# Patient Record
Sex: MALE | Race: WHITE | NOT HISPANIC OR LATINO | Employment: OTHER | URBAN - METROPOLITAN AREA
[De-identification: names, ages, dates, MRNs, and addresses within clinical notes are randomized per-mention and may not be internally consistent; named-entity substitution may affect disease eponyms.]

---

## 2023-04-03 LAB
ALANINE AMINOTRANSFERASE (SGPT) (U/L) IN SER/PLAS: 17 U/L (ref 10–52)
ALBUMIN (G/DL) IN SER/PLAS: 4.3 G/DL (ref 3.4–5)
ALKALINE PHOSPHATASE (U/L) IN SER/PLAS: 94 U/L (ref 33–136)
ANION GAP IN SER/PLAS: 11 MMOL/L (ref 10–20)
ASPARTATE AMINOTRANSFERASE (SGOT) (U/L) IN SER/PLAS: 16 U/L (ref 9–39)
BILIRUBIN TOTAL (MG/DL) IN SER/PLAS: 0.9 MG/DL (ref 0–1.2)
CALCIDIOL (25 OH VITAMIN D3) (NG/ML) IN SER/PLAS: 50 NG/ML
CALCIUM (MG/DL) IN SER/PLAS: 9.8 MG/DL (ref 8.6–10.3)
CARBON DIOXIDE, TOTAL (MMOL/L) IN SER/PLAS: 29 MMOL/L (ref 21–32)
CHLORIDE (MMOL/L) IN SER/PLAS: 103 MMOL/L (ref 98–107)
CREATININE (MG/DL) IN SER/PLAS: 1.05 MG/DL (ref 0.5–1.3)
GFR MALE: 78 ML/MIN/1.73M2
GLUCOSE (MG/DL) IN SER/PLAS: 94 MG/DL (ref 74–99)
POTASSIUM (MMOL/L) IN SER/PLAS: 4 MMOL/L (ref 3.5–5.3)
PROTEIN TOTAL: 7 G/DL (ref 6.4–8.2)
SODIUM (MMOL/L) IN SER/PLAS: 139 MMOL/L (ref 136–145)
UREA NITROGEN (MG/DL) IN SER/PLAS: 9 MG/DL (ref 6–23)

## 2023-04-28 ENCOUNTER — OFFICE VISIT (OUTPATIENT)
Dept: PRIMARY CARE | Facility: CLINIC | Age: 68
End: 2023-04-28
Payer: MEDICARE

## 2023-04-28 VITALS
HEART RATE: 60 BPM | TEMPERATURE: 96.1 F | HEIGHT: 73 IN | DIASTOLIC BLOOD PRESSURE: 85 MMHG | OXYGEN SATURATION: 94 % | RESPIRATION RATE: 16 BRPM | SYSTOLIC BLOOD PRESSURE: 120 MMHG | WEIGHT: 156.4 LBS | BODY MASS INDEX: 20.73 KG/M2

## 2023-04-28 DIAGNOSIS — J42 CHRONIC BRONCHITIS, UNSPECIFIED CHRONIC BRONCHITIS TYPE (MULTI): Primary | ICD-10-CM

## 2023-04-28 DIAGNOSIS — E55.9 VITAMIN D DEFICIENCY: ICD-10-CM

## 2023-04-28 DIAGNOSIS — Z13.29 THYROID DISORDER SCREEN: ICD-10-CM

## 2023-04-28 DIAGNOSIS — E87.1 HYPONATREMIA: ICD-10-CM

## 2023-04-28 DIAGNOSIS — Z13.220 SCREENING FOR HYPERLIPIDEMIA: ICD-10-CM

## 2023-04-28 DIAGNOSIS — I10 BENIGN ESSENTIAL HYPERTENSION: ICD-10-CM

## 2023-04-28 DIAGNOSIS — Z12.5 SCREENING FOR PROSTATE CANCER: ICD-10-CM

## 2023-04-28 PROBLEM — H93.13 TINNITUS OF BOTH EARS: Status: ACTIVE | Noted: 2023-04-28

## 2023-04-28 PROBLEM — E87.6 HYPOKALEMIA: Status: ACTIVE | Noted: 2023-04-28

## 2023-04-28 PROBLEM — M48.061 SPINAL STENOSIS OF LUMBAR REGION: Status: ACTIVE | Noted: 2023-04-28

## 2023-04-28 PROBLEM — M47.817 LUMBOSACRAL SPONDYLOSIS WITHOUT MYELOPATHY: Status: ACTIVE | Noted: 2023-04-28

## 2023-04-28 PROBLEM — Z96.21 COCHLEAR IMPLANT IN PLACE: Status: ACTIVE | Noted: 2023-04-28

## 2023-04-28 PROBLEM — M47.816 DJD (DEGENERATIVE JOINT DISEASE), LUMBAR: Status: ACTIVE | Noted: 2023-04-28

## 2023-04-28 PROBLEM — M51.369 DEGENERATION OF LUMBAR INTERVERTEBRAL DISC: Status: ACTIVE | Noted: 2023-04-28

## 2023-04-28 PROBLEM — M51.36 DEGENERATION OF LUMBAR INTERVERTEBRAL DISC: Status: ACTIVE | Noted: 2023-04-28

## 2023-04-28 PROBLEM — M47.814 DJD (DEGENERATIVE JOINT DISEASE), THORACIC: Status: ACTIVE | Noted: 2023-04-28

## 2023-04-28 PROBLEM — F10.11 ALCOHOL ABUSE, IN REMISSION: Status: ACTIVE | Noted: 2023-04-28

## 2023-04-28 PROBLEM — K63.5 HYPERPLASTIC COLONIC POLYP: Status: ACTIVE | Noted: 2023-04-28

## 2023-04-28 PROCEDURE — 3074F SYST BP LT 130 MM HG: CPT | Performed by: FAMILY MEDICINE

## 2023-04-28 PROCEDURE — 99214 OFFICE O/P EST MOD 30 MIN: CPT | Performed by: FAMILY MEDICINE

## 2023-04-28 PROCEDURE — 3079F DIAST BP 80-89 MM HG: CPT | Performed by: FAMILY MEDICINE

## 2023-04-28 PROCEDURE — 4004F PT TOBACCO SCREEN RCVD TLK: CPT | Performed by: FAMILY MEDICINE

## 2023-04-28 PROCEDURE — 1159F MED LIST DOCD IN RCRD: CPT | Performed by: FAMILY MEDICINE

## 2023-04-28 RX ORDER — METOPROLOL TARTRATE 50 MG/1
1 TABLET ORAL DAILY
COMMUNITY
Start: 2022-04-05 | End: 2023-08-29 | Stop reason: WASHOUT

## 2023-04-28 RX ORDER — FLUTICASONE PROPIONATE AND SALMETEROL 250; 50 UG/1; UG/1
POWDER RESPIRATORY (INHALATION) 2 TIMES DAILY
COMMUNITY
Start: 2020-02-19 | End: 2024-01-04 | Stop reason: SDUPTHER

## 2023-04-28 RX ORDER — ALBUTEROL SULFATE 90 UG/1
AEROSOL, METERED RESPIRATORY (INHALATION)
COMMUNITY
Start: 2022-03-22 | End: 2024-01-04 | Stop reason: SDUPTHER

## 2023-04-28 ASSESSMENT — ENCOUNTER SYMPTOMS
ABDOMINAL PAIN: 0
CHEST TIGHTNESS: 0
ARTHRALGIAS: 0
CONFUSION: 0
FEVER: 0
SHORTNESS OF BREATH: 0
PALPITATIONS: 0
CHILLS: 0

## 2023-04-28 NOTE — PROGRESS NOTES
"Subjective   Patient ID: Isaac Condon is a 68 y.o. male who presents for Follow-up (5 month check and review labs).    HPI   Patient today for follow-up of ongoing healthcare issues and review of lab work he denies any major new acute issues.  Says he is tolerating his medications.  Review of Systems   Constitutional:  Negative for chills and fever.   HENT:  Negative for congestion and ear pain.    Eyes:  Negative for visual disturbance.   Respiratory:  Negative for chest tightness and shortness of breath.    Cardiovascular:  Negative for chest pain and palpitations.   Gastrointestinal:  Negative for abdominal pain.   Musculoskeletal:  Negative for arthralgias.   Skin:  Negative for pallor.   Psychiatric/Behavioral:  Negative for confusion.        Objective   /85 (BP Location: Left arm, Patient Position: Sitting)   Pulse 60   Temp 35.6 °C (96.1 °F)   Resp 16   Ht 1.854 m (6' 1\")   Wt 70.9 kg (156 lb 6.4 oz)   SpO2 94%   BMI 20.63 kg/m²     Physical Exam  Vitals and nursing note reviewed.   Constitutional:       General: He is not in acute distress.     Appearance: Normal appearance. He is not ill-appearing.   HENT:      Head: Normocephalic and atraumatic.      Right Ear: Tympanic membrane, ear canal and external ear normal.      Left Ear: Tympanic membrane, ear canal and external ear normal.      Mouth/Throat:      Pharynx: Oropharynx is clear.   Eyes:      Extraocular Movements: Extraocular movements intact.   Cardiovascular:      Rate and Rhythm: Normal rate and regular rhythm.      Pulses: Normal pulses.      Heart sounds: Normal heart sounds.   Pulmonary:      Effort: Pulmonary effort is normal.      Breath sounds: Normal breath sounds.   Abdominal:      General: Abdomen is flat. Bowel sounds are normal.      Palpations: Abdomen is soft.      Tenderness: There is no abdominal tenderness.   Musculoskeletal:         General: Normal range of motion.      Cervical back: Neck supple.   Skin:     " General: Skin is warm.   Neurological:      Mental Status: He is alert and oriented to person, place, and time. Mental status is at baseline.   Psychiatric:         Mood and Affect: Mood normal.       Recent Results (from the past 1008 hour(s))   Vitamin D, Total    Collection Time: 04/03/23 10:23 AM   Result Value Ref Range    Vitamin D, 25-Hydroxy 50 ng/mL   Comprehensive Metabolic Panel    Collection Time: 04/03/23 10:23 AM   Result Value Ref Range    Glucose 94 74 - 99 mg/dL    Sodium 139 136 - 145 mmol/L    Potassium 4.0 3.5 - 5.3 mmol/L    Chloride 103 98 - 107 mmol/L    Bicarbonate 29 21 - 32 mmol/L    Anion Gap 11 10 - 20 mmol/L    Urea Nitrogen 9 6 - 23 mg/dL    Creatinine 1.05 0.50 - 1.30 mg/dL    GFR MALE 78 >90 mL/min/1.73m2    Calcium 9.8 8.6 - 10.3 mg/dL    Albumin 4.3 3.4 - 5.0 g/dL    Alkaline Phosphatase 94 33 - 136 U/L    Total Protein 7.0 6.4 - 8.2 g/dL    AST 16 9 - 39 U/L    Total Bilirubin 0.9 0.0 - 1.2 mg/dL    ALT (SGPT) 17 10 - 52 U/L     Return to office 4 months with repeat fasting labs    Assessment/Plan   Problem List Items Addressed This Visit       Benign essential hypertension     Stable continue current medication         Relevant Orders    Follow Up In Primary Care    CBC    Comprehensive Metabolic Panel    Chronic bronchitis (CMS/HCC) - Primary     Clinically stable patient denies any acute flareups unfortunately though he continues to smoke.  Continue Advair Diskus as well as albuterol rescue inhaler which she really feels the need for.         Relevant Orders    Follow Up In Primary Care    CBC    Comprehensive Metabolic Panel    Hyponatremia     Sodium in normal range continue lifestyle modifications and abstain from alcohol         Relevant Orders    Follow Up In Primary Care    Vitamin D deficiency     Vitamin D has improved he will remain off of vitamin D supplement we will continue to monitor         Relevant Orders    Follow Up In Primary Care    Vitamin D 1,25 Dihydroxy     Screening for prostate cancer     Screening PSA         Relevant Orders    Prostate Specific Antigen, Screen    Thyroid disorder screen     Screening TSH         Relevant Orders    TSH with reflex to Free T4 if abnormal    Screening for hyperlipidemia     Fasting lipid         Relevant Orders    Lipid Panel

## 2023-04-28 NOTE — ASSESSMENT & PLAN NOTE
Clinically stable patient denies any acute flareups unfortunately though he continues to smoke.  Continue Advair Diskus as well as albuterol rescue inhaler which she really feels the need for.

## 2023-08-21 ENCOUNTER — LAB (OUTPATIENT)
Dept: LAB | Facility: LAB | Age: 68
End: 2023-08-21
Payer: MEDICARE

## 2023-08-21 DIAGNOSIS — E55.9 VITAMIN D DEFICIENCY: ICD-10-CM

## 2023-08-21 DIAGNOSIS — Z12.5 SCREENING FOR PROSTATE CANCER: ICD-10-CM

## 2023-08-21 DIAGNOSIS — J42 CHRONIC BRONCHITIS, UNSPECIFIED CHRONIC BRONCHITIS TYPE (MULTI): ICD-10-CM

## 2023-08-21 DIAGNOSIS — Z13.220 SCREENING FOR HYPERLIPIDEMIA: ICD-10-CM

## 2023-08-21 DIAGNOSIS — I10 BENIGN ESSENTIAL HYPERTENSION: ICD-10-CM

## 2023-08-21 DIAGNOSIS — Z13.29 THYROID DISORDER SCREEN: ICD-10-CM

## 2023-08-21 LAB
ALANINE AMINOTRANSFERASE (SGPT) (U/L) IN SER/PLAS: 16 U/L (ref 10–52)
ALBUMIN (G/DL) IN SER/PLAS: 3.9 G/DL (ref 3.4–5)
ALKALINE PHOSPHATASE (U/L) IN SER/PLAS: 118 U/L (ref 33–136)
ANION GAP IN SER/PLAS: 12 MMOL/L (ref 10–20)
ASPARTATE AMINOTRANSFERASE (SGOT) (U/L) IN SER/PLAS: 15 U/L (ref 9–39)
BILIRUBIN TOTAL (MG/DL) IN SER/PLAS: 0.7 MG/DL (ref 0–1.2)
CALCIUM (MG/DL) IN SER/PLAS: 9.3 MG/DL (ref 8.6–10.3)
CARBON DIOXIDE, TOTAL (MMOL/L) IN SER/PLAS: 26 MMOL/L (ref 21–32)
CHLORIDE (MMOL/L) IN SER/PLAS: 101 MMOL/L (ref 98–107)
CHOLESTEROL (MG/DL) IN SER/PLAS: 194 MG/DL (ref 0–199)
CHOLESTEROL IN HDL (MG/DL) IN SER/PLAS: 50.8 MG/DL
CHOLESTEROL/HDL RATIO: 3.8
CREATININE (MG/DL) IN SER/PLAS: 0.91 MG/DL (ref 0.5–1.3)
ERYTHROCYTE DISTRIBUTION WIDTH (RATIO) BY AUTOMATED COUNT: 11.4 % (ref 11.5–14.5)
ERYTHROCYTE MEAN CORPUSCULAR HEMOGLOBIN CONCENTRATION (G/DL) BY AUTOMATED: 34.8 G/DL (ref 32–36)
ERYTHROCYTE MEAN CORPUSCULAR VOLUME (FL) BY AUTOMATED COUNT: 96 FL (ref 80–100)
ERYTHROCYTES (10*6/UL) IN BLOOD BY AUTOMATED COUNT: 4.59 X10E12/L (ref 4.5–5.9)
GFR MALE: >90 ML/MIN/1.73M2
GLUCOSE (MG/DL) IN SER/PLAS: 83 MG/DL (ref 74–99)
HEMATOCRIT (%) IN BLOOD BY AUTOMATED COUNT: 44.2 % (ref 41–52)
HEMOGLOBIN (G/DL) IN BLOOD: 15.4 G/DL (ref 13.5–17.5)
LDL: 123 MG/DL (ref 0–99)
LEUKOCYTES (10*3/UL) IN BLOOD BY AUTOMATED COUNT: 6.1 X10E9/L (ref 4.4–11.3)
PLATELETS (10*3/UL) IN BLOOD AUTOMATED COUNT: 266 X10E9/L (ref 150–450)
POTASSIUM (MMOL/L) IN SER/PLAS: 4.1 MMOL/L (ref 3.5–5.3)
PROSTATE SPECIFIC ANTIGEN,SCREEN: 0.31 NG/ML (ref 0–4)
PROTEIN TOTAL: 6.7 G/DL (ref 6.4–8.2)
SODIUM (MMOL/L) IN SER/PLAS: 135 MMOL/L (ref 136–145)
THYROTROPIN (MIU/L) IN SER/PLAS BY DETECTION LIMIT <= 0.05 MIU/L: 2.27 MIU/L (ref 0.44–3.98)
TRIGLYCERIDE (MG/DL) IN SER/PLAS: 99 MG/DL (ref 0–149)
UREA NITROGEN (MG/DL) IN SER/PLAS: 8 MG/DL (ref 6–23)
VLDL: 20 MG/DL (ref 0–40)

## 2023-08-21 PROCEDURE — 80061 LIPID PANEL: CPT

## 2023-08-21 PROCEDURE — 80053 COMPREHEN METABOLIC PANEL: CPT

## 2023-08-21 PROCEDURE — 84443 ASSAY THYROID STIM HORMONE: CPT

## 2023-08-21 PROCEDURE — 36415 COLL VENOUS BLD VENIPUNCTURE: CPT

## 2023-08-21 PROCEDURE — 82652 VIT D 1 25-DIHYDROXY: CPT

## 2023-08-21 PROCEDURE — G0103 PSA SCREENING: HCPCS

## 2023-08-21 PROCEDURE — 85027 COMPLETE CBC AUTOMATED: CPT

## 2023-08-24 LAB — VITAMIN D 1,25-DIHYDROXY: 54.7 PG/ML (ref 19.9–79.3)

## 2023-08-29 ENCOUNTER — OFFICE VISIT (OUTPATIENT)
Dept: PRIMARY CARE | Facility: CLINIC | Age: 68
End: 2023-08-29
Payer: MEDICARE

## 2023-08-29 VITALS
DIASTOLIC BLOOD PRESSURE: 80 MMHG | TEMPERATURE: 97.5 F | OXYGEN SATURATION: 93 % | HEIGHT: 72 IN | RESPIRATION RATE: 14 BRPM | HEART RATE: 74 BPM | BODY MASS INDEX: 19.91 KG/M2 | WEIGHT: 147 LBS | SYSTOLIC BLOOD PRESSURE: 132 MMHG

## 2023-08-29 DIAGNOSIS — I10 BENIGN ESSENTIAL HYPERTENSION: ICD-10-CM

## 2023-08-29 DIAGNOSIS — Z00.00 MEDICARE ANNUAL WELLNESS VISIT, SUBSEQUENT: Primary | ICD-10-CM

## 2023-08-29 DIAGNOSIS — E55.9 VITAMIN D DEFICIENCY: ICD-10-CM

## 2023-08-29 DIAGNOSIS — E87.1 HYPONATREMIA: ICD-10-CM

## 2023-08-29 DIAGNOSIS — J42 CHRONIC BRONCHITIS, UNSPECIFIED CHRONIC BRONCHITIS TYPE (MULTI): ICD-10-CM

## 2023-08-29 PROCEDURE — G0439 PPPS, SUBSEQ VISIT: HCPCS | Performed by: FAMILY MEDICINE

## 2023-08-29 PROCEDURE — 1125F AMNT PAIN NOTED PAIN PRSNT: CPT | Performed by: FAMILY MEDICINE

## 2023-08-29 PROCEDURE — 1159F MED LIST DOCD IN RCRD: CPT | Performed by: FAMILY MEDICINE

## 2023-08-29 PROCEDURE — 1160F RVW MEDS BY RX/DR IN RCRD: CPT | Performed by: FAMILY MEDICINE

## 2023-08-29 PROCEDURE — 3079F DIAST BP 80-89 MM HG: CPT | Performed by: FAMILY MEDICINE

## 2023-08-29 PROCEDURE — 3075F SYST BP GE 130 - 139MM HG: CPT | Performed by: FAMILY MEDICINE

## 2023-08-29 PROCEDURE — 1170F FXNL STATUS ASSESSED: CPT | Performed by: FAMILY MEDICINE

## 2023-08-29 PROCEDURE — 99213 OFFICE O/P EST LOW 20 MIN: CPT | Performed by: FAMILY MEDICINE

## 2023-08-29 PROCEDURE — 4004F PT TOBACCO SCREEN RCVD TLK: CPT | Performed by: FAMILY MEDICINE

## 2023-08-29 RX ORDER — METOPROLOL TARTRATE 50 MG/1
50 TABLET ORAL DAILY
Qty: 30 TABLET | Refills: 11 | Status: CANCELLED | OUTPATIENT
Start: 2023-08-29 | End: 2024-08-28

## 2023-08-29 ASSESSMENT — ENCOUNTER SYMPTOMS
ARTHRALGIAS: 0
FEVER: 0
CHEST TIGHTNESS: 0
CHILLS: 0
PALPITATIONS: 0
ABDOMINAL PAIN: 0
DEPRESSION: 0
OCCASIONAL FEELINGS OF UNSTEADINESS: 0
LOSS OF SENSATION IN FEET: 0
SHORTNESS OF BREATH: 0
CONFUSION: 0

## 2023-08-29 ASSESSMENT — PATIENT HEALTH QUESTIONNAIRE - PHQ9
2. FEELING DOWN, DEPRESSED OR HOPELESS: NOT AT ALL
1. LITTLE INTEREST OR PLEASURE IN DOING THINGS: NOT AT ALL
SUM OF ALL RESPONSES TO PHQ9 QUESTIONS 1 AND 2: 0

## 2023-08-29 ASSESSMENT — ACTIVITIES OF DAILY LIVING (ADL)
MANAGING_FINANCES: INDEPENDENT
BATHING: INDEPENDENT
GROCERY_SHOPPING: INDEPENDENT
DRESSING: INDEPENDENT
DOING_HOUSEWORK: INDEPENDENT
TAKING_MEDICATION: INDEPENDENT

## 2023-08-29 NOTE — ASSESSMENT & PLAN NOTE
Recent labs reviewed  Colonoscopy up-to-date  Pneumo coccal vaccine completed  We discussed recommendations with regards to flu vaccine shingles vaccination RSV vaccination and the new COVID booster due out of the fall he states he will consider

## 2023-08-29 NOTE — ASSESSMENT & PLAN NOTE
Medically stable.  Unfortunately continues to smoke and is not interested in quitting.  He has albuterol rescue inhaler as well as Advair discus at home.  But he admits he only uses the Advair when he feels he needs it.

## 2023-08-29 NOTE — PROGRESS NOTES
Subjective   Reason for Visit: Isaac Condon is an 68 y.o. male here for a Medicare Wellness visit.     Past Medical, Surgical, and Family History reviewed and updated in chart.    Reviewed all medications by prescribing practitioner or clinical pharmacist (such as prescriptions, OTCs, herbal therapies and supplements) and documented in the medical record.    HPI  Patient in today for follow-up of ongoing healthcare issues he admits that he has not been taking his metoprolol for the last several months.  He says since he is retired he does not have the stress of dealing with the public does not feel he needs the blood pressure medicine.  Otherwise states he has been feeling okay.  Patient Care Team:  Giles Finch MD as PCP - General  G[facility] Finch MD as PCP [facility]     Review of Systems   Constitutional:  Negative for chills and fever.   HENT:  Positive for hearing loss. Negative for congestion and ear pain.         History of cochlear implant   Eyes:  Negative for visual disturbance.   Respiratory:  Negative for chest tightness and shortness of breath.    Cardiovascular:  Negative for chest pain and palpitations.   Gastrointestinal:  Negative for abdominal pain.   Musculoskeletal:  Negative for arthralgias.   Skin:  Negative for pallor.   Psychiatric/Behavioral:  Negative for confusion.        Objective   Vitals:  /80   Pulse 74   Temp 36.4 °C (97.5 °F)   Resp 14   Ht 1.829 m (6')   Wt 66.7 kg (147 lb)   SpO2 93%   BMI 19.94 kg/m²       Physical Exam  Vitals and nursing note reviewed.   Constitutional:       General: He is not in acute distress.     Appearance: Normal appearance. He is not ill-appearing.   HENT:      Head: Normocephalic and atraumatic.      Ears:      Comments: History of cochlear implant     Mouth/Throat:      Pharynx: Oropharynx is clear.   Eyes:      Extraocular Movements: Extraocular movements intact.   Cardiovascular:      Rate and Rhythm: Normal  rate and regular rhythm.      Pulses: Normal pulses.      Heart sounds: Normal heart sounds.   Pulmonary:      Effort: Pulmonary effort is normal.      Breath sounds: Normal breath sounds.   Abdominal:      General: Abdomen is flat. Bowel sounds are normal.      Palpations: Abdomen is soft.      Tenderness: There is no abdominal tenderness.   Musculoskeletal:         General: Normal range of motion.      Cervical back: Neck supple.   Skin:     General: Skin is warm.   Neurological:      Mental Status: He is alert and oriented to person, place, and time. Mental status is at baseline.   Psychiatric:         Mood and Affect: Mood normal.       Recent Results (from the past 1008 hour(s))   CBC    Collection Time: 08/21/23 10:02 AM   Result Value Ref Range    WBC 6.1 4.4 - 11.3 x10E9/L    RBC 4.59 4.50 - 5.90 x10E12/L    Hemoglobin 15.4 13.5 - 17.5 g/dL    Hematocrit 44.2 41.0 - 52.0 %    MCV 96 80 - 100 fL    MCHC 34.8 32.0 - 36.0 g/dL    Platelets 266 150 - 450 x10E9/L    RDW 11.4 (L) 11.5 - 14.5 %   Comprehensive Metabolic Panel    Collection Time: 08/21/23 10:02 AM   Result Value Ref Range    Glucose 83 74 - 99 mg/dL    Sodium 135 (L) 136 - 145 mmol/L    Potassium 4.1 3.5 - 5.3 mmol/L    Chloride 101 98 - 107 mmol/L    Bicarbonate 26 21 - 32 mmol/L    Anion Gap 12 10 - 20 mmol/L    Urea Nitrogen 8 6 - 23 mg/dL    Creatinine 0.91 0.50 - 1.30 mg/dL    GFR MALE >90 >90 mL/min/1.73m2    Calcium 9.3 8.6 - 10.3 mg/dL    Albumin 3.9 3.4 - 5.0 g/dL    Alkaline Phosphatase 118 33 - 136 U/L    Total Protein 6.7 6.4 - 8.2 g/dL    AST 15 9 - 39 U/L    Total Bilirubin 0.7 0.0 - 1.2 mg/dL    ALT (SGPT) 16 10 - 52 U/L   Lipid Panel    Collection Time: 08/21/23 10:02 AM   Result Value Ref Range    Cholesterol 194 0 - 199 mg/dL    HDL 50.8 mg/dL    Cholesterol/HDL Ratio 3.8      (H) 0 - 99 mg/dL    VLDL 20 0 - 40 mg/dL    Triglycerides 99 0 - 149 mg/dL   Prostate Specific Antigen, Screen    Collection Time: 08/21/23 10:02 AM    Result Value Ref Range    Prostate Specific Antigen,Screen 0.31 0.00 - 4.00 ng/mL   TSH with reflex to Free T4 if abnormal    Collection Time: 08/21/23 10:02 AM   Result Value Ref Range    TSH 2.27 0.44 - 3.98 mIU/L   Vitamin D 1,25 Dihydroxy    Collection Time: 08/21/23 10:02 AM   Result Value Ref Range    Vit D, 1,25-Dihydroxy 54.7 19.9 - 79.3 pg/mL     Recent labs reviewed  Maintain low-fat low-cholesterol diet  We discussed monitoring blood pressure at home for now we will allow him to continue to try and keep blood pressure under control through lifestyle modifications and we will discontinue the metoprolol which she has not been taking    Return to our office 4 months with repeat lab work      Assessment/Plan   Problem List Items Addressed This Visit       Benign essential hypertension    Current Assessment & Plan     BP stable off of medication.  We will continue to monitor for now and see if he can manage to keep BP under control through lifestyle modifications         Relevant Orders    Follow Up In Primary Care - Established    Chronic bronchitis (CMS/HCC)    Current Assessment & Plan     Medically stable.  Unfortunately continues to smoke and is not interested in quitting.  He has albuterol rescue inhaler as well as Advair discus at home.  But he admits he only uses the Advair when he feels he needs it.         Relevant Orders    Follow Up In Primary Care - Established    Hyponatremia    Current Assessment & Plan     Mild at 135 we discussed lifestyle/dietary modifications we will continue to monitor         Relevant Orders    Follow Up In Primary Care - Established    Comprehensive Metabolic Panel    Vitamin D deficiency    Current Assessment & Plan     Continue to monitor and supplement as needed         Relevant Orders    Follow Up In Primary Care - Established    Vitamin D, Total    Comprehensive Metabolic Panel    Medicare annual wellness visit, subsequent - Primary    Current Assessment & Plan      Recent labs reviewed  Colonoscopy up-to-date  Pneumo coccal vaccine completed  We discussed recommendations with regards to flu vaccine shingles vaccination RSV vaccination and the new COVID booster due out of the fall he states he will consider

## 2024-01-03 ENCOUNTER — LAB (OUTPATIENT)
Dept: LAB | Facility: LAB | Age: 69
End: 2024-01-03
Payer: MEDICARE

## 2024-01-03 DIAGNOSIS — E87.1 HYPONATREMIA: ICD-10-CM

## 2024-01-03 DIAGNOSIS — E55.9 VITAMIN D DEFICIENCY: ICD-10-CM

## 2024-01-03 LAB
25(OH)D3 SERPL-MCNC: 27 NG/ML (ref 30–100)
ALBUMIN SERPL BCP-MCNC: 4.3 G/DL (ref 3.4–5)
ALP SERPL-CCNC: 94 U/L (ref 33–136)
ALT SERPL W P-5'-P-CCNC: 24 U/L (ref 10–52)
ANION GAP SERPL CALC-SCNC: 13 MMOL/L (ref 10–20)
AST SERPL W P-5'-P-CCNC: 20 U/L (ref 9–39)
BILIRUB SERPL-MCNC: 1.3 MG/DL (ref 0–1.2)
BUN SERPL-MCNC: 10 MG/DL (ref 6–23)
CALCIUM SERPL-MCNC: 9.3 MG/DL (ref 8.6–10.3)
CHLORIDE SERPL-SCNC: 99 MMOL/L (ref 98–107)
CO2 SERPL-SCNC: 27 MMOL/L (ref 21–32)
CREAT SERPL-MCNC: 0.94 MG/DL (ref 0.5–1.3)
GFR SERPL CREATININE-BSD FRML MDRD: 89 ML/MIN/1.73M*2
GLUCOSE SERPL-MCNC: 83 MG/DL (ref 74–99)
POTASSIUM SERPL-SCNC: 3.8 MMOL/L (ref 3.5–5.3)
PROT SERPL-MCNC: 6.6 G/DL (ref 6.4–8.2)
SODIUM SERPL-SCNC: 135 MMOL/L (ref 136–145)

## 2024-01-03 PROCEDURE — 80053 COMPREHEN METABOLIC PANEL: CPT

## 2024-01-03 PROCEDURE — 36415 COLL VENOUS BLD VENIPUNCTURE: CPT

## 2024-01-03 PROCEDURE — 82306 VITAMIN D 25 HYDROXY: CPT

## 2024-01-04 ENCOUNTER — OFFICE VISIT (OUTPATIENT)
Dept: PRIMARY CARE | Facility: CLINIC | Age: 69
End: 2024-01-04
Payer: MEDICARE

## 2024-01-04 VITALS
BODY MASS INDEX: 21.16 KG/M2 | SYSTOLIC BLOOD PRESSURE: 125 MMHG | TEMPERATURE: 96.4 F | DIASTOLIC BLOOD PRESSURE: 78 MMHG | HEART RATE: 56 BPM | WEIGHT: 156 LBS | RESPIRATION RATE: 14 BRPM | OXYGEN SATURATION: 97 %

## 2024-01-04 DIAGNOSIS — K63.5 HYPERPLASTIC COLONIC POLYP, UNSPECIFIED PART OF COLON: ICD-10-CM

## 2024-01-04 DIAGNOSIS — I10 BENIGN ESSENTIAL HYPERTENSION: ICD-10-CM

## 2024-01-04 DIAGNOSIS — J42 CHRONIC BRONCHITIS, UNSPECIFIED CHRONIC BRONCHITIS TYPE (MULTI): ICD-10-CM

## 2024-01-04 DIAGNOSIS — E87.1 HYPONATREMIA: ICD-10-CM

## 2024-01-04 DIAGNOSIS — Z23 ENCOUNTER FOR IMMUNIZATION: Primary | ICD-10-CM

## 2024-01-04 DIAGNOSIS — E55.9 VITAMIN D DEFICIENCY: ICD-10-CM

## 2024-01-04 PROCEDURE — 1159F MED LIST DOCD IN RCRD: CPT | Performed by: FAMILY MEDICINE

## 2024-01-04 PROCEDURE — G0009 ADMIN PNEUMOCOCCAL VACCINE: HCPCS | Performed by: FAMILY MEDICINE

## 2024-01-04 PROCEDURE — 1160F RVW MEDS BY RX/DR IN RCRD: CPT | Performed by: FAMILY MEDICINE

## 2024-01-04 PROCEDURE — 4004F PT TOBACCO SCREEN RCVD TLK: CPT | Performed by: FAMILY MEDICINE

## 2024-01-04 PROCEDURE — 90732 PPSV23 VACC 2 YRS+ SUBQ/IM: CPT | Performed by: FAMILY MEDICINE

## 2024-01-04 PROCEDURE — 99213 OFFICE O/P EST LOW 20 MIN: CPT | Performed by: FAMILY MEDICINE

## 2024-01-04 PROCEDURE — 3074F SYST BP LT 130 MM HG: CPT | Performed by: FAMILY MEDICINE

## 2024-01-04 PROCEDURE — 1125F AMNT PAIN NOTED PAIN PRSNT: CPT | Performed by: FAMILY MEDICINE

## 2024-01-04 PROCEDURE — 3078F DIAST BP <80 MM HG: CPT | Performed by: FAMILY MEDICINE

## 2024-01-04 RX ORDER — FLUTICASONE PROPIONATE AND SALMETEROL 250; 50 UG/1; UG/1
1 POWDER RESPIRATORY (INHALATION)
Qty: 60 EACH | Refills: 11 | Status: SHIPPED | OUTPATIENT
Start: 2024-01-04 | End: 2025-01-03

## 2024-01-04 RX ORDER — ALBUTEROL SULFATE 90 UG/1
2 AEROSOL, METERED RESPIRATORY (INHALATION) EVERY 6 HOURS PRN
Qty: 18 G | Refills: 11 | Status: SHIPPED | OUTPATIENT
Start: 2024-01-04 | End: 2025-01-03

## 2024-01-04 RX ORDER — CHOLECALCIFEROL (VITAMIN D3) 50 MCG
2000 TABLET ORAL DAILY
COMMUNITY

## 2024-01-04 ASSESSMENT — ENCOUNTER SYMPTOMS
PALPITATIONS: 0
CHEST TIGHTNESS: 0
CHILLS: 0
FEVER: 0
ABDOMINAL PAIN: 0
ARTHRALGIAS: 0
SHORTNESS OF BREATH: 0
CONFUSION: 0

## 2024-01-04 NOTE — ASSESSMENT & PLAN NOTE
Pneumo 23 x 1 today to update and complete his pneumonia series    Reviewed recommendations with regards to Shingrix and RSV vaccines

## 2024-01-04 NOTE — ASSESSMENT & PLAN NOTE
Last colonoscopy March 2021 check with his general surgeon as to whether or not he needs to have his colonoscopy updated this year or if there can let him go 10 years.  Further plans pending their input.

## 2024-01-04 NOTE — ASSESSMENT & PLAN NOTE
Medically stable refill inhalers unfortunately patient continues to smoke and is not interested in quitting.

## 2024-01-04 NOTE — PROGRESS NOTES
Subjective   Patient ID: Isaac Condon is a 68 y.o. male who presents for Follow-up (4 month).    HPI   Patient today for follow-up of ongoing healthcare issues and review of labs overall is been feeling good.  Denies any major new acute complaints.  Review of Systems   Constitutional:  Negative for chills and fever.   HENT:  Positive for hearing loss. Negative for congestion and ear pain.         History of cochlear implant   Eyes:  Negative for visual disturbance.   Respiratory:  Negative for chest tightness and shortness of breath.    Cardiovascular:  Negative for chest pain and palpitations.   Gastrointestinal:  Negative for abdominal pain.   Musculoskeletal:  Negative for arthralgias.   Skin:  Negative for pallor.   Psychiatric/Behavioral:  Negative for confusion.        Objective   /78   Pulse 56   Temp 35.8 °C (96.4 °F)   Resp 14   Wt 70.8 kg (156 lb)   SpO2 97%   BMI 21.16 kg/m²     Physical Exam  Vitals and nursing note reviewed.   Constitutional:       General: He is not in acute distress.     Appearance: Normal appearance. He is not ill-appearing.   HENT:      Head: Normocephalic and atraumatic.      Right Ear: Tympanic membrane, ear canal and external ear normal.      Left Ear: Tympanic membrane, ear canal and external ear normal.      Mouth/Throat:      Pharynx: Oropharynx is clear.   Eyes:      Extraocular Movements: Extraocular movements intact.   Cardiovascular:      Rate and Rhythm: Normal rate and regular rhythm.      Pulses: Normal pulses.      Heart sounds: Normal heart sounds.   Pulmonary:      Effort: Pulmonary effort is normal.      Breath sounds: Normal breath sounds.   Abdominal:      General: Abdomen is flat. Bowel sounds are normal.      Palpations: Abdomen is soft.      Tenderness: There is no abdominal tenderness.   Musculoskeletal:         General: Normal range of motion.      Cervical back: Neck supple.   Skin:     General: Skin is warm.   Neurological:      Mental Status:  He is alert and oriented to person, place, and time. Mental status is at baseline.   Psychiatric:         Mood and Affect: Mood normal.       Recent Results (from the past 1008 hour(s))   Vitamin D, Total    Collection Time: 01/03/24 10:24 AM   Result Value Ref Range    Vitamin D, 25-Hydroxy, Total 27 (L) 30 - 100 ng/mL   Comprehensive Metabolic Panel    Collection Time: 01/03/24 10:24 AM   Result Value Ref Range    Glucose 83 74 - 99 mg/dL    Sodium 135 (L) 136 - 145 mmol/L    Potassium 3.8 3.5 - 5.3 mmol/L    Chloride 99 98 - 107 mmol/L    Bicarbonate 27 21 - 32 mmol/L    Anion Gap 13 10 - 20 mmol/L    Urea Nitrogen 10 6 - 23 mg/dL    Creatinine 0.94 0.50 - 1.30 mg/dL    eGFR 89 >60 mL/min/1.73m*2    Calcium 9.3 8.6 - 10.3 mg/dL    Albumin 4.3 3.4 - 5.0 g/dL    Alkaline Phosphatase 94 33 - 136 U/L    Total Protein 6.6 6.4 - 8.2 g/dL    AST 20 9 - 39 U/L    Bilirubin, Total 1.3 (H) 0.0 - 1.2 mg/dL    ALT 24 10 - 52 U/L     Recent labs reviewed with patient most part numbers are stable vitamin D has gone down restart vitamin D3 2000 units a day  Prevnar 23 x 1 today to update and complete his pneumonia series.    He refuses flu vaccine and COVID vaccines    Check with general surgeon regarding updating colonoscopy    Return 4 months with repeat fasting labs  Necessary refills provided      Assessment/Plan   Problem List Items Addressed This Visit             ICD-10-CM    Benign essential hypertension I10     Stable off of medication continue lifestyle modifications and we will monitor         Chronic bronchitis (CMS/[facility]) J42     Medically stable refill inhalers unfortunately patient continues to smoke and is not interested in quitting.         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  Further plans pending their input.         Hyponatremia E87.1     Stable dietary modifications and continue to monitor         Relevant Orders    Comprehensive Metabolic Panel    Follow Up In Primary Care - Established    Vitamin D deficiency E55.9     Continue to monitor and supplement as needed         Relevant Orders    Comprehensive Metabolic Panel    Vitamin D 25-Hydroxy,Total (for eval of Vitamin D levels)    Follow Up In Primary Care - Established    Encounter for immunization - Primary Z23     Pneumo 23 x 1 today to update and complete his pneumonia series    Reviewed recommendations with regards to Shingrix and RSV vaccines         Relevant Orders    Pneumococcal polysaccharide vaccine, 23-valent, age 2 years and older (PNEUMOVAX 23) (Completed)

## 2024-05-10 ENCOUNTER — LAB (OUTPATIENT)
Dept: LAB | Facility: LAB | Age: 69
End: 2024-05-10
Payer: MEDICARE

## 2024-05-10 DIAGNOSIS — E87.1 HYPONATREMIA: ICD-10-CM

## 2024-05-10 DIAGNOSIS — E55.9 VITAMIN D DEFICIENCY: ICD-10-CM

## 2024-05-10 LAB
25(OH)D3 SERPL-MCNC: 24 NG/ML (ref 30–100)
ALBUMIN SERPL BCP-MCNC: 4.1 G/DL (ref 3.4–5)
ALP SERPL-CCNC: 92 U/L (ref 33–136)
ALT SERPL W P-5'-P-CCNC: 22 U/L (ref 10–52)
ANION GAP SERPL CALC-SCNC: 11 MMOL/L (ref 10–20)
AST SERPL W P-5'-P-CCNC: 16 U/L (ref 9–39)
BILIRUB SERPL-MCNC: 0.8 MG/DL (ref 0–1.2)
BUN SERPL-MCNC: 8 MG/DL (ref 6–23)
CALCIUM SERPL-MCNC: 9.2 MG/DL (ref 8.6–10.3)
CHLORIDE SERPL-SCNC: 103 MMOL/L (ref 98–107)
CO2 SERPL-SCNC: 27 MMOL/L (ref 21–32)
CREAT SERPL-MCNC: 0.9 MG/DL (ref 0.5–1.3)
EGFRCR SERPLBLD CKD-EPI 2021: >90 ML/MIN/1.73M*2
GLUCOSE SERPL-MCNC: 99 MG/DL (ref 74–99)
POTASSIUM SERPL-SCNC: 4.3 MMOL/L (ref 3.5–5.3)
PROT SERPL-MCNC: 6.5 G/DL (ref 6.4–8.2)
SODIUM SERPL-SCNC: 137 MMOL/L (ref 136–145)

## 2024-05-10 PROCEDURE — 36415 COLL VENOUS BLD VENIPUNCTURE: CPT

## 2024-05-10 PROCEDURE — 82306 VITAMIN D 25 HYDROXY: CPT

## 2024-05-10 PROCEDURE — 80053 COMPREHEN METABOLIC PANEL: CPT

## 2024-05-14 ENCOUNTER — OFFICE VISIT (OUTPATIENT)
Dept: PRIMARY CARE | Facility: CLINIC | Age: 69
End: 2024-05-14
Payer: COMMERCIAL

## 2024-05-14 VITALS
HEART RATE: 60 BPM | BODY MASS INDEX: 20.75 KG/M2 | OXYGEN SATURATION: 94 % | SYSTOLIC BLOOD PRESSURE: 110 MMHG | RESPIRATION RATE: 14 BRPM | TEMPERATURE: 96.8 F | WEIGHT: 153 LBS | DIASTOLIC BLOOD PRESSURE: 78 MMHG

## 2024-05-14 DIAGNOSIS — Z13.220 SCREENING FOR HYPERLIPIDEMIA: ICD-10-CM

## 2024-05-14 DIAGNOSIS — Z87.891 PERSONAL HISTORY OF NICOTINE DEPENDENCE: ICD-10-CM

## 2024-05-14 DIAGNOSIS — E55.9 VITAMIN D DEFICIENCY: ICD-10-CM

## 2024-05-14 DIAGNOSIS — F17.200 CURRENT SMOKER: ICD-10-CM

## 2024-05-14 DIAGNOSIS — Z12.5 SCREENING FOR PROSTATE CANCER: ICD-10-CM

## 2024-05-14 DIAGNOSIS — J42 CHRONIC BRONCHITIS, UNSPECIFIED CHRONIC BRONCHITIS TYPE (MULTI): Primary | ICD-10-CM

## 2024-05-14 DIAGNOSIS — E87.1 HYPONATREMIA: ICD-10-CM

## 2024-05-14 DIAGNOSIS — Z13.29 THYROID DISORDER SCREEN: ICD-10-CM

## 2024-05-14 PROBLEM — I10 BENIGN ESSENTIAL HYPERTENSION: Status: RESOLVED | Noted: 2023-04-28 | Resolved: 2024-05-14

## 2024-05-14 PROBLEM — E87.6 HYPOKALEMIA: Status: RESOLVED | Noted: 2023-04-28 | Resolved: 2024-05-14

## 2024-05-14 PROCEDURE — 1123F ACP DISCUSS/DSCN MKR DOCD: CPT | Performed by: FAMILY MEDICINE

## 2024-05-14 PROCEDURE — 1160F RVW MEDS BY RX/DR IN RCRD: CPT | Performed by: FAMILY MEDICINE

## 2024-05-14 PROCEDURE — 4004F PT TOBACCO SCREEN RCVD TLK: CPT | Performed by: FAMILY MEDICINE

## 2024-05-14 PROCEDURE — 1159F MED LIST DOCD IN RCRD: CPT | Performed by: FAMILY MEDICINE

## 2024-05-14 PROCEDURE — 99213 OFFICE O/P EST LOW 20 MIN: CPT | Performed by: FAMILY MEDICINE

## 2024-05-14 ASSESSMENT — ENCOUNTER SYMPTOMS
ARTHRALGIAS: 0
CHEST TIGHTNESS: 0
SHORTNESS OF BREATH: 0
CONFUSION: 0
PALPITATIONS: 0
CHILLS: 0
FEVER: 0
ABDOMINAL PAIN: 0

## 2024-05-14 NOTE — ASSESSMENT & PLAN NOTE
Patient is smoked at least 1 pack of cigarettes daily since the age of 18.    Currently not interested in quitting smoking    Check CT lung screening baseline    Reviewed pros and cons of diagnostic testing need for potential follow-up he verbalizes understanding.

## 2024-05-14 NOTE — PROGRESS NOTES
Subjective   Patient ID: Isaac Condon is a 69 y.o. male who presents for Follow-up (4 month).    HPI   Patient today for follow-up of ongoing healthcare issues overall doing okay denies any major new acute complaints.  He does continue to smoke and not interested in quitting.  Review of Systems   Constitutional:  Negative for chills and fever.   HENT:  Negative for congestion and ear pain.    Eyes:  Negative for visual disturbance.   Respiratory:  Negative for chest tightness and shortness of breath.    Cardiovascular:  Negative for chest pain and palpitations.   Gastrointestinal:  Negative for abdominal pain.   Musculoskeletal:  Negative for arthralgias.   Skin:  Negative for pallor.   Psychiatric/Behavioral:  Negative for confusion.        Objective   /78   Pulse 60   Temp 36 °C (96.8 °F)   Resp 14   Wt 69.4 kg (153 lb)   SpO2 94%   BMI 20.75 kg/m²     Physical Exam  Vitals and nursing note reviewed.   Constitutional:       General: He is not in acute distress.     Appearance: Normal appearance. He is not ill-appearing.   HENT:      Head: Normocephalic and atraumatic.      Right Ear: Tympanic membrane, ear canal and external ear normal.      Left Ear: Tympanic membrane, ear canal and external ear normal.      Mouth/Throat:      Pharynx: Oropharynx is clear.   Eyes:      Extraocular Movements: Extraocular movements intact.   Cardiovascular:      Rate and Rhythm: Normal rate and regular rhythm.      Pulses: Normal pulses.      Heart sounds: Normal heart sounds.   Pulmonary:      Effort: Pulmonary effort is normal.      Breath sounds: Normal breath sounds.   Abdominal:      General: Abdomen is flat. Bowel sounds are normal.      Palpations: Abdomen is soft.      Tenderness: There is no abdominal tenderness.   Musculoskeletal:         General: Normal range of motion.      Cervical back: Neck supple.   Skin:     General: Skin is warm.   Neurological:      Mental Status: He is alert and oriented to person,  place, and time. Mental status is at baseline.   Psychiatric:         Mood and Affect: Mood normal.       Recent Results (from the past 1008 hour(s))   Comprehensive Metabolic Panel    Collection Time: 05/10/24  9:50 AM   Result Value Ref Range    Glucose 99 74 - 99 mg/dL    Sodium 137 136 - 145 mmol/L    Potassium 4.3 3.5 - 5.3 mmol/L    Chloride 103 98 - 107 mmol/L    Bicarbonate 27 21 - 32 mmol/L    Anion Gap 11 10 - 20 mmol/L    Urea Nitrogen 8 6 - 23 mg/dL    Creatinine 0.90 0.50 - 1.30 mg/dL    eGFR >90 >60 mL/min/1.73m*2    Calcium 9.2 8.6 - 10.3 mg/dL    Albumin 4.1 3.4 - 5.0 g/dL    Alkaline Phosphatase 92 33 - 136 U/L    Total Protein 6.5 6.4 - 8.2 g/dL    AST 16 9 - 39 U/L    Bilirubin, Total 0.8 0.0 - 1.2 mg/dL    ALT 22 10 - 52 U/L   Vitamin D 25-Hydroxy,Total (for eval of Vitamin D levels)    Collection Time: 05/10/24  9:50 AM   Result Value Ref Range    Vitamin D, 25-Hydroxy, Total 24 (L) 30 - 100 ng/mL     Recent labs reviewed with patient overall numbers are stable except vitamin D deficiency for which she is supposed to be taking vitamin D3 2000 units daily    Regarding his history of tobacco abuse  Schedule baseline CT lung screening further plans pending those results.    Return to office in 4 months with repeat fasting labs    Assessment/Plan   Problem List Items Addressed This Visit             ICD-10-CM    Chronic bronchitis (Multi) - Primary J42     Stable continue Advair and albuterol inhalers as directed.  Unfortunately continues to smoke and not interested in quitting.         Hyponatremia E87.1     Stable continue to monitor         Relevant Orders    CBC    Comprehensive Metabolic Panel    Follow Up In Primary Care - Established    Vitamin D deficiency E55.9     Vitamin D3 2000 units daily         Relevant Orders    CBC    Comprehensive Metabolic Panel    Vitamin D 25-Hydroxy,Total (for eval of Vitamin D levels)    Follow Up In Primary Care - Established    Screening for prostate cancer  Z12.5     Screening PSA         Relevant Orders    Prostate Specific Antigen, Screen    Thyroid disorder screen Z13.29     TSH with reflex         Relevant Orders    TSH with reflex to Free T4 if abnormal    Screening for hyperlipidemia Z13.220     Fasting lipid         Relevant Orders    Lipid Panel    Current smoker F17.200     Patient is smoked at least 1 pack of cigarettes daily since the age of 18.    Currently not interested in quitting smoking    Check CT lung screening baseline    Reviewed pros and cons of diagnostic testing need for potential follow-up he verbalizes understanding.         Relevant Orders    CT lung screening low dose     Other Visit Diagnoses         Codes    Personal history of nicotine dependence     Z87.891    Relevant Orders    CT lung screening low dose

## 2024-05-14 NOTE — ASSESSMENT & PLAN NOTE
Stable continue Advair and albuterol inhalers as directed.  Unfortunately continues to smoke and not interested in quitting.

## 2024-05-30 ENCOUNTER — HOSPITAL ENCOUNTER (OUTPATIENT)
Dept: RADIOLOGY | Facility: CLINIC | Age: 69
Discharge: HOME | End: 2024-05-30
Payer: MEDICARE

## 2024-05-30 DIAGNOSIS — Z87.891 PERSONAL HISTORY OF NICOTINE DEPENDENCE: ICD-10-CM

## 2024-05-30 DIAGNOSIS — F17.200 CURRENT SMOKER: ICD-10-CM

## 2024-05-30 PROCEDURE — 71271 CT THORAX LUNG CANCER SCR C-: CPT

## 2024-06-03 DIAGNOSIS — Z87.891 PERSONAL HISTORY OF NICOTINE DEPENDENCE: ICD-10-CM

## 2024-06-03 DIAGNOSIS — F17.200 CURRENT SMOKER: ICD-10-CM

## 2024-06-03 DIAGNOSIS — R91.8 LUNG NODULES: Primary | ICD-10-CM

## 2024-08-29 ENCOUNTER — LAB (OUTPATIENT)
Dept: LAB | Facility: LAB | Age: 69
End: 2024-08-29
Payer: COMMERCIAL

## 2024-08-29 DIAGNOSIS — Z13.29 THYROID DISORDER SCREEN: ICD-10-CM

## 2024-08-29 DIAGNOSIS — Z13.220 SCREENING FOR HYPERLIPIDEMIA: ICD-10-CM

## 2024-08-29 DIAGNOSIS — E55.9 VITAMIN D DEFICIENCY: ICD-10-CM

## 2024-08-29 DIAGNOSIS — Z12.5 SCREENING FOR PROSTATE CANCER: ICD-10-CM

## 2024-08-29 DIAGNOSIS — E87.1 HYPONATREMIA: ICD-10-CM

## 2024-08-29 LAB
25(OH)D3 SERPL-MCNC: 44 NG/ML (ref 30–100)
ALBUMIN SERPL BCP-MCNC: 4.3 G/DL (ref 3.4–5)
ALP SERPL-CCNC: 102 U/L (ref 33–136)
ALT SERPL W P-5'-P-CCNC: 27 U/L (ref 10–52)
ANION GAP SERPL CALC-SCNC: 9 MMOL/L (ref 10–20)
AST SERPL W P-5'-P-CCNC: 20 U/L (ref 9–39)
BILIRUB SERPL-MCNC: 0.9 MG/DL (ref 0–1.2)
BUN SERPL-MCNC: 8 MG/DL (ref 6–23)
CALCIUM SERPL-MCNC: 9.3 MG/DL (ref 8.6–10.3)
CHLORIDE SERPL-SCNC: 101 MMOL/L (ref 98–107)
CHOLEST SERPL-MCNC: 204 MG/DL (ref 0–199)
CHOLESTEROL/HDL RATIO: 2.6
CO2 SERPL-SCNC: 29 MMOL/L (ref 21–32)
CREAT SERPL-MCNC: 0.82 MG/DL (ref 0.5–1.3)
EGFRCR SERPLBLD CKD-EPI 2021: >90 ML/MIN/1.73M*2
ERYTHROCYTE [DISTWIDTH] IN BLOOD BY AUTOMATED COUNT: 11.5 % (ref 11.5–14.5)
GLUCOSE SERPL-MCNC: 93 MG/DL (ref 74–99)
HCT VFR BLD AUTO: 46.7 % (ref 41–52)
HDLC SERPL-MCNC: 78.3 MG/DL
HGB BLD-MCNC: 15.9 G/DL (ref 13.5–17.5)
LDLC SERPL CALC-MCNC: 111 MG/DL
MCH RBC QN AUTO: 33.7 PG (ref 26–34)
MCHC RBC AUTO-ENTMCNC: 34 G/DL (ref 32–36)
MCV RBC AUTO: 99 FL (ref 80–100)
NON HDL CHOLESTEROL: 126 MG/DL (ref 0–149)
NRBC BLD-RTO: 0 /100 WBCS (ref 0–0)
PLATELET # BLD AUTO: 176 X10*3/UL (ref 150–450)
POTASSIUM SERPL-SCNC: 3.6 MMOL/L (ref 3.5–5.3)
PROT SERPL-MCNC: 6.7 G/DL (ref 6.4–8.2)
PSA SERPL-MCNC: 0.32 NG/ML
RBC # BLD AUTO: 4.72 X10*6/UL (ref 4.5–5.9)
SODIUM SERPL-SCNC: 135 MMOL/L (ref 136–145)
TRIGL SERPL-MCNC: 75 MG/DL (ref 0–149)
TSH SERPL-ACNC: 3.62 MIU/L (ref 0.44–3.98)
VLDL: 15 MG/DL (ref 0–40)
WBC # BLD AUTO: 6.8 X10*3/UL (ref 4.4–11.3)

## 2024-08-29 PROCEDURE — G0103 PSA SCREENING: HCPCS

## 2024-08-29 PROCEDURE — 80053 COMPREHEN METABOLIC PANEL: CPT

## 2024-08-29 PROCEDURE — 84443 ASSAY THYROID STIM HORMONE: CPT

## 2024-08-29 PROCEDURE — 85027 COMPLETE CBC AUTOMATED: CPT

## 2024-08-29 PROCEDURE — 80061 LIPID PANEL: CPT

## 2024-08-29 PROCEDURE — 82306 VITAMIN D 25 HYDROXY: CPT

## 2024-09-19 ENCOUNTER — APPOINTMENT (OUTPATIENT)
Dept: PRIMARY CARE | Facility: CLINIC | Age: 69
End: 2024-09-19
Payer: COMMERCIAL

## 2024-09-19 VITALS
BODY MASS INDEX: 20.28 KG/M2 | OXYGEN SATURATION: 95 % | DIASTOLIC BLOOD PRESSURE: 84 MMHG | WEIGHT: 153 LBS | RESPIRATION RATE: 14 BRPM | TEMPERATURE: 96.9 F | SYSTOLIC BLOOD PRESSURE: 133 MMHG | HEIGHT: 73 IN | HEART RATE: 56 BPM

## 2024-09-19 DIAGNOSIS — J42 CHRONIC BRONCHITIS, UNSPECIFIED CHRONIC BRONCHITIS TYPE (MULTI): ICD-10-CM

## 2024-09-19 DIAGNOSIS — R91.8 LUNG NODULES: ICD-10-CM

## 2024-09-19 DIAGNOSIS — E87.1 HYPONATREMIA: ICD-10-CM

## 2024-09-19 DIAGNOSIS — Z00.00 MEDICARE ANNUAL WELLNESS VISIT, SUBSEQUENT: Primary | ICD-10-CM

## 2024-09-19 DIAGNOSIS — E55.9 VITAMIN D DEFICIENCY: ICD-10-CM

## 2024-09-19 PROCEDURE — 4004F PT TOBACCO SCREEN RCVD TLK: CPT | Performed by: FAMILY MEDICINE

## 2024-09-19 PROCEDURE — 3008F BODY MASS INDEX DOCD: CPT | Performed by: FAMILY MEDICINE

## 2024-09-19 PROCEDURE — 99214 OFFICE O/P EST MOD 30 MIN: CPT | Performed by: FAMILY MEDICINE

## 2024-09-19 PROCEDURE — 1160F RVW MEDS BY RX/DR IN RCRD: CPT | Performed by: FAMILY MEDICINE

## 2024-09-19 PROCEDURE — G0439 PPPS, SUBSEQ VISIT: HCPCS | Performed by: FAMILY MEDICINE

## 2024-09-19 PROCEDURE — 1159F MED LIST DOCD IN RCRD: CPT | Performed by: FAMILY MEDICINE

## 2024-09-19 PROCEDURE — 1170F FXNL STATUS ASSESSED: CPT | Performed by: FAMILY MEDICINE

## 2024-09-19 PROCEDURE — 1123F ACP DISCUSS/DSCN MKR DOCD: CPT | Performed by: FAMILY MEDICINE

## 2024-09-19 RX ORDER — ALBUTEROL SULFATE 90 UG/1
2 INHALANT RESPIRATORY (INHALATION) EVERY 6 HOURS PRN
Qty: 18 G | Refills: 5 | Status: SHIPPED | OUTPATIENT
Start: 2024-09-19 | End: 2025-09-19

## 2024-09-19 RX ORDER — FLUTICASONE PROPIONATE AND SALMETEROL 250; 50 UG/1; UG/1
1 POWDER RESPIRATORY (INHALATION)
Qty: 60 EACH | Refills: 5 | Status: SHIPPED | OUTPATIENT
Start: 2024-09-19 | End: 2025-09-19

## 2024-09-19 ASSESSMENT — ACTIVITIES OF DAILY LIVING (ADL)
BATHING: INDEPENDENT
DRESSING: INDEPENDENT
TAKING_MEDICATION: INDEPENDENT
MANAGING_FINANCES: INDEPENDENT
GROCERY_SHOPPING: INDEPENDENT
DOING_HOUSEWORK: INDEPENDENT

## 2024-09-19 ASSESSMENT — ENCOUNTER SYMPTOMS
FEVER: 0
CONFUSION: 0
SHORTNESS OF BREATH: 0
ABDOMINAL PAIN: 0
CHEST TIGHTNESS: 0
PALPITATIONS: 0
ARTHRALGIAS: 0
CHILLS: 0

## 2024-09-19 NOTE — ASSESSMENT & PLAN NOTE
Stable continue albuterol and Advair.  Patient also working on trying to quit smoking.    Orders:    fluticasone propion-salmeteroL (Advair Diskus) 250-50 mcg/dose diskus inhaler; Inhale 1 puff 2 times a day.    albuterol 90 mcg/actuation inhaler; Inhale 2 puffs every 6 hours if needed for wheezing.    Comprehensive Metabolic Panel; Future    Follow Up In Primary Care - Established; Future

## 2024-09-19 NOTE — PROGRESS NOTES
"Subjective   Reason for Visit: Isaac Condon is an 69 y.o. male here for a Medicare Wellness visit.     Past Medical, Surgical, and Family History reviewed and updated in chart.    Reviewed all medications by prescribing practitioner or clinical pharmacist (such as prescriptions, OTCs, herbal therapies and supplements) and documented in the medical record.    HPI  Patient today with wife for follow-up of ongoing healthcare issues.  Overall is been doing good.  Denies any major new acute complaints.  He is in the process of trying to quit smoking.  Also needs refills on his albuterol and Advair.  Patient Care Team:  Giles Finch MD as PCP - General  Giles Finch MD as PCP - [facility] Attributed Provider     Review of Systems   Constitutional:  Negative for chills and fever.   HENT:  Positive for hearing loss. Negative for congestion and ear pain.         Has cochlear implant   Eyes:  Negative for visual disturbance.   Respiratory:  Negative for chest tightness and shortness of breath.    Cardiovascular:  Negative for chest pain and palpitations.   Gastrointestinal:  Negative for abdominal pain.   Musculoskeletal:  Negative for arthralgias.   Skin:  Negative for pallor.   Psychiatric/Behavioral:  Negative for confusion.        Objective   Vitals:  /84   Pulse 56   Temp 36.1 °C (96.9 °F)   Resp 14   Ht 1.854 m (6' 1\")   Wt 69.4 kg (153 lb)   SpO2 95%   BMI 20.19 kg/m²       Physical Exam  Vitals and nursing note reviewed.   Constitutional:       General: He is not in acute distress.     Appearance: Normal appearance. He is not ill-appearing.   HENT:      Head: Normocephalic and atraumatic.      Left Ear: Tympanic membrane, ear canal and external ear normal.      Ears:      Comments: Right cochlear implant     Mouth/Throat:      Pharynx: Oropharynx is clear.   Eyes:      Extraocular Movements: Extraocular movements intact.   Cardiovascular:      Rate and Rhythm: Normal rate and regular rhythm.     "  Pulses: Normal pulses.      Heart sounds: Normal heart sounds.   Pulmonary:      Effort: Pulmonary effort is normal.      Breath sounds: Normal breath sounds.   Abdominal:      General: Abdomen is flat. Bowel sounds are normal.      Palpations: Abdomen is soft.      Tenderness: There is no abdominal tenderness.   Musculoskeletal:         General: Normal range of motion.      Cervical back: Neck supple.   Skin:     General: Skin is warm.   Neurological:      Mental Status: He is alert and oriented to person, place, and time. Mental status is at baseline.   Psychiatric:         Mood and Affect: Mood normal.       Recent Results (from the past 1008 hour(s))   CBC    Collection Time: 08/29/24 10:28 AM   Result Value Ref Range    WBC 6.8 4.4 - 11.3 x10*3/uL    nRBC 0.0 0.0 - 0.0 /100 WBCs    RBC 4.72 4.50 - 5.90 x10*6/uL    Hemoglobin 15.9 13.5 - 17.5 g/dL    Hematocrit 46.7 41.0 - 52.0 %    MCV 99 80 - 100 fL    MCH 33.7 26.0 - 34.0 pg    MCHC 34.0 32.0 - 36.0 g/dL    RDW 11.5 11.5 - 14.5 %    Platelets 176 150 - 450 x10*3/uL   Comprehensive Metabolic Panel    Collection Time: 08/29/24 10:28 AM   Result Value Ref Range    Glucose 93 74 - 99 mg/dL    Sodium 135 (L) 136 - 145 mmol/L    Potassium 3.6 3.5 - 5.3 mmol/L    Chloride 101 98 - 107 mmol/L    Bicarbonate 29 21 - 32 mmol/L    Anion Gap 9 (L) 10 - 20 mmol/L    Urea Nitrogen 8 6 - 23 mg/dL    Creatinine 0.82 0.50 - 1.30 mg/dL    eGFR >90 >60 mL/min/1.73m*2    Calcium 9.3 8.6 - 10.3 mg/dL    Albumin 4.3 3.4 - 5.0 g/dL    Alkaline Phosphatase 102 33 - 136 U/L    Total Protein 6.7 6.4 - 8.2 g/dL    AST 20 9 - 39 U/L    Bilirubin, Total 0.9 0.0 - 1.2 mg/dL    ALT 27 10 - 52 U/L   Lipid Panel    Collection Time: 08/29/24 10:28 AM   Result Value Ref Range    Cholesterol 204 (H) 0 - 199 mg/dL    HDL-Cholesterol 78.3 mg/dL    Cholesterol/HDL Ratio 2.6     LDL Calculated 111 (H) <=99 mg/dL    VLDL 15 0 - 40 mg/dL    Triglycerides 75 0 - 149 mg/dL    Non HDL Cholesterol 126 0  - 149 mg/dL   Vitamin D 25-Hydroxy,Total (for eval of Vitamin D levels)    Collection Time: 08/29/24 10:28 AM   Result Value Ref Range    Vitamin D, 25-Hydroxy, Total 44 30 - 100 ng/mL   TSH with reflex to Free T4 if abnormal    Collection Time: 08/29/24 10:28 AM   Result Value Ref Range    Thyroid Stimulating Hormone 3.62 0.44 - 3.98 mIU/L   Prostate Specific Antigen, Screen    Collection Time: 08/29/24 10:28 AM   Result Value Ref Range    Prostate Specific Antigen,Screen 0.32 <=4.00 ng/mL     Recent labs reviewed overall his numbers are stable.  He will continue current medications and follow a healthy diet.    CT lung screening from May reviewed with he and his wife.    Colon cancer screening up-to-date    Return to office 4 months with repeat labs.        Assessment & Plan  Medicare annual wellness visit, subsequent  Recent labs reviewed.    Pneumococcal vaccine series completed.    We discussed RSV patient wife says they will get at local pharmacy.    He refuses a flu shot COVID and Shingrix vaccines.    Colonoscopy up-to-date.  PSA up-to-date.         Chronic bronchitis, unspecified chronic bronchitis type (Multi)  Stable continue albuterol and Advair.  Patient also working on trying to quit smoking.    Orders:    fluticasone propion-salmeteroL (Advair Diskus) 250-50 mcg/dose diskus inhaler; Inhale 1 puff 2 times a day.    albuterol 90 mcg/actuation inhaler; Inhale 2 puffs every 6 hours if needed for wheezing.    Comprehensive Metabolic Panel; Future    Follow Up In Primary Care - Established; Future    Hyponatremia  Stable continue to monitor    Orders:    Follow Up In Primary Care - Established    Comprehensive Metabolic Panel; Future    Follow Up In Primary Care - Established; Future    Lung nodules  CT lung screening done in May reviewed with patient and wife showing multiple bilateral nodules felt to be benign per report and radiology is recommending a follow-up CT lung screening in a year.          Vitamin D deficiency  Vitamin D level has improved continue to monitor and supplement.    Orders:    Follow Up In Primary Care - Established    Comprehensive Metabolic Panel; Future    Vitamin D 25-Hydroxy,Total (for eval of Vitamin D levels); Future    Follow Up In Primary Care - Established; Future

## 2024-09-19 NOTE — ASSESSMENT & PLAN NOTE
Recent labs reviewed.    Pneumococcal vaccine series completed.    We discussed RSV patient wife says they will get at local pharmacy.    He refuses a flu shot COVID and Shingrix vaccines.    Colonoscopy up-to-date.  PSA up-to-date.

## 2024-09-19 NOTE — ASSESSMENT & PLAN NOTE
CT lung screening done in May reviewed with patient and wife showing multiple bilateral nodules felt to be benign per report and radiology is recommending a follow-up CT lung screening in a year.

## 2024-09-19 NOTE — ASSESSMENT & PLAN NOTE
Vitamin D level has improved continue to monitor and supplement.    Orders:    Follow Up In Primary Care - Established    Comprehensive Metabolic Panel; Future    Vitamin D 25-Hydroxy,Total (for eval of Vitamin D levels); Future    Follow Up In Primary Care - Established; Future

## 2024-09-19 NOTE — ASSESSMENT & PLAN NOTE
Stable continue to monitor    Orders:    Follow Up In Primary Care - Established    Comprehensive Metabolic Panel; Future    Follow Up In Primary Care - Established; Future

## 2024-09-30 RX ORDER — FLUTICASONE PROPIONATE AND SALMETEROL XINAFOATE 45; 21 UG/1; UG/1
AEROSOL, METERED RESPIRATORY (INHALATION)
Refills: 0 | OUTPATIENT
Start: 2024-09-30

## 2024-12-01 ENCOUNTER — APPOINTMENT (OUTPATIENT)
Dept: RADIOLOGY | Facility: HOSPITAL | Age: 69
End: 2024-12-01
Payer: MEDICARE

## 2024-12-01 ENCOUNTER — HOSPITAL ENCOUNTER (EMERGENCY)
Facility: HOSPITAL | Age: 69
Discharge: HOME | End: 2024-12-01
Attending: EMERGENCY MEDICINE
Payer: MEDICARE

## 2024-12-01 VITALS
RESPIRATION RATE: 16 BRPM | TEMPERATURE: 97.4 F | BODY MASS INDEX: 20.99 KG/M2 | OXYGEN SATURATION: 100 % | HEART RATE: 78 BPM | DIASTOLIC BLOOD PRESSURE: 101 MMHG | SYSTOLIC BLOOD PRESSURE: 160 MMHG | HEIGHT: 72 IN | WEIGHT: 155 LBS

## 2024-12-01 DIAGNOSIS — R55 SYNCOPE, UNSPECIFIED SYNCOPE TYPE: Primary | ICD-10-CM

## 2024-12-01 LAB
ALBUMIN SERPL BCP-MCNC: 4.1 G/DL (ref 3.4–5)
ALP SERPL-CCNC: 117 U/L (ref 33–136)
ALT SERPL W P-5'-P-CCNC: 20 U/L (ref 10–52)
ANION GAP SERPL CALC-SCNC: 14 MMOL/L (ref 10–20)
APPEARANCE UR: CLEAR
AST SERPL W P-5'-P-CCNC: 20 U/L (ref 9–39)
BASOPHILS # BLD AUTO: 0.06 X10*3/UL (ref 0–0.1)
BASOPHILS NFR BLD AUTO: 0.9 %
BILIRUB SERPL-MCNC: 0.7 MG/DL (ref 0–1.2)
BILIRUB UR STRIP.AUTO-MCNC: NEGATIVE MG/DL
BUN SERPL-MCNC: 5 MG/DL (ref 6–23)
CALCIUM SERPL-MCNC: 9 MG/DL (ref 8.6–10.3)
CHLORIDE SERPL-SCNC: 96 MMOL/L (ref 98–107)
CO2 SERPL-SCNC: 24 MMOL/L (ref 21–32)
COLOR UR: NORMAL
CREAT SERPL-MCNC: 0.87 MG/DL (ref 0.5–1.3)
EGFRCR SERPLBLD CKD-EPI 2021: >90 ML/MIN/1.73M*2
EOSINOPHIL # BLD AUTO: 0.18 X10*3/UL (ref 0–0.7)
EOSINOPHIL NFR BLD AUTO: 2.6 %
ERYTHROCYTE [DISTWIDTH] IN BLOOD BY AUTOMATED COUNT: 11.9 % (ref 11.5–14.5)
ETHANOL SERPL-MCNC: 46 MG/DL
GLUCOSE BLD MANUAL STRIP-MCNC: 114 MG/DL (ref 74–99)
GLUCOSE SERPL-MCNC: 105 MG/DL (ref 74–99)
GLUCOSE UR STRIP.AUTO-MCNC: NORMAL MG/DL
HCT VFR BLD AUTO: 45.4 % (ref 41–52)
HGB BLD-MCNC: 16.3 G/DL (ref 13.5–17.5)
IMM GRANULOCYTES # BLD AUTO: 0.03 X10*3/UL (ref 0–0.7)
IMM GRANULOCYTES NFR BLD AUTO: 0.4 % (ref 0–0.9)
KETONES UR STRIP.AUTO-MCNC: NEGATIVE MG/DL
LEUKOCYTE ESTERASE UR QL STRIP.AUTO: NEGATIVE
LYMPHOCYTES # BLD AUTO: 1.8 X10*3/UL (ref 1.2–4.8)
LYMPHOCYTES NFR BLD AUTO: 26.4 %
MCH RBC QN AUTO: 34.2 PG (ref 26–34)
MCHC RBC AUTO-ENTMCNC: 35.9 G/DL (ref 32–36)
MCV RBC AUTO: 95 FL (ref 80–100)
MONOCYTES # BLD AUTO: 0.55 X10*3/UL (ref 0.1–1)
MONOCYTES NFR BLD AUTO: 8.1 %
NEUTROPHILS # BLD AUTO: 4.21 X10*3/UL (ref 1.2–7.7)
NEUTROPHILS NFR BLD AUTO: 61.6 %
NITRITE UR QL STRIP.AUTO: NEGATIVE
NRBC BLD-RTO: 0 /100 WBCS (ref 0–0)
PH UR STRIP.AUTO: 6 [PH]
PLATELET # BLD AUTO: 180 X10*3/UL (ref 150–450)
POTASSIUM SERPL-SCNC: 3.5 MMOL/L (ref 3.5–5.3)
PROT SERPL-MCNC: 6.7 G/DL (ref 6.4–8.2)
PROT UR STRIP.AUTO-MCNC: NEGATIVE MG/DL
RBC # BLD AUTO: 4.77 X10*6/UL (ref 4.5–5.9)
RBC # UR STRIP.AUTO: NEGATIVE /UL
SODIUM SERPL-SCNC: 130 MMOL/L (ref 136–145)
SP GR UR STRIP.AUTO: 1.01
UROBILINOGEN UR STRIP.AUTO-MCNC: NORMAL MG/DL
WBC # BLD AUTO: 6.8 X10*3/UL (ref 4.4–11.3)

## 2024-12-01 PROCEDURE — 96360 HYDRATION IV INFUSION INIT: CPT

## 2024-12-01 PROCEDURE — 99284 EMERGENCY DEPT VISIT MOD MDM: CPT | Mod: 25 | Performed by: EMERGENCY MEDICINE

## 2024-12-01 PROCEDURE — 84075 ASSAY ALKALINE PHOSPHATASE: CPT | Performed by: EMERGENCY MEDICINE

## 2024-12-01 PROCEDURE — 85025 COMPLETE CBC W/AUTO DIFF WBC: CPT | Performed by: EMERGENCY MEDICINE

## 2024-12-01 PROCEDURE — 36415 COLL VENOUS BLD VENIPUNCTURE: CPT | Performed by: EMERGENCY MEDICINE

## 2024-12-01 PROCEDURE — 2500000004 HC RX 250 GENERAL PHARMACY W/ HCPCS (ALT 636 FOR OP/ED): Performed by: EMERGENCY MEDICINE

## 2024-12-01 PROCEDURE — 70450 CT HEAD/BRAIN W/O DYE: CPT | Performed by: RADIOLOGY

## 2024-12-01 PROCEDURE — 81003 URINALYSIS AUTO W/O SCOPE: CPT | Performed by: EMERGENCY MEDICINE

## 2024-12-01 PROCEDURE — 70450 CT HEAD/BRAIN W/O DYE: CPT

## 2024-12-01 PROCEDURE — 82077 ASSAY SPEC XCP UR&BREATH IA: CPT | Performed by: EMERGENCY MEDICINE

## 2024-12-01 PROCEDURE — 82947 ASSAY GLUCOSE BLOOD QUANT: CPT | Mod: 59

## 2024-12-01 ASSESSMENT — COLUMBIA-SUICIDE SEVERITY RATING SCALE - C-SSRS
2. HAVE YOU ACTUALLY HAD ANY THOUGHTS OF KILLING YOURSELF?: NO
1. IN THE PAST MONTH, HAVE YOU WISHED YOU WERE DEAD OR WISHED YOU COULD GO TO SLEEP AND NOT WAKE UP?: NO
6. HAVE YOU EVER DONE ANYTHING, STARTED TO DO ANYTHING, OR PREPARED TO DO ANYTHING TO END YOUR LIFE?: NO

## 2024-12-01 ASSESSMENT — LIFESTYLE VARIABLES
EVER HAD A DRINK FIRST THING IN THE MORNING TO STEADY YOUR NERVES TO GET RID OF A HANGOVER: NO
HAVE YOU EVER FELT YOU SHOULD CUT DOWN ON YOUR DRINKING: NO
TOTAL SCORE: 0
HAVE PEOPLE ANNOYED YOU BY CRITICIZING YOUR DRINKING: NO
EVER FELT BAD OR GUILTY ABOUT YOUR DRINKING: NO

## 2024-12-01 ASSESSMENT — PAIN - FUNCTIONAL ASSESSMENT: PAIN_FUNCTIONAL_ASSESSMENT: 0-10

## 2024-12-01 ASSESSMENT — PAIN SCALES - GENERAL: PAINLEVEL_OUTOF10: 0 - NO PAIN

## 2024-12-01 NOTE — ED PROVIDER NOTES
HPI   Chief Complaint   Patient presents with    Syncope       Patient resents emergency department after what sounds like a syncopal episode.  Patient fell at home and then had shaking.  Patient then awoke and was fine.  Patient admits to drinking 2 beers while watching football game today.  He states he has not eaten anything yet today.  He is not a diabetic.  He denies any pain.  No headache or neck pain.  No chest pain or shortness of breath.  No abdominal pain.  No nausea or vomiting.  He is not on blood thinning medication.  No other complaints at this time.  He currently has no symptoms.                          [place] Coma Scale Score: 15         NIH Stroke Scale: 0          Patient History   Past Medical History:   Diagnosis Date    Acute bronchitis due to other specified organisms 03/14/2016    Acute bronchitis due to other specified organisms    Encounter for other preprocedural examination 06/19/2018    Preoperative clearance    Other chest pain 03/14/2016    Chest discomfort    Personal history of other specified conditions 03/14/2016    History of wheezing    Personal history of other specified conditions 03/14/2016    History of fever    Sensorineural hearing loss, bilateral 10/26/2018    Bilateral asymmetric sensorineural hearing loss    Tinnitus, right ear 05/17/2018    Subjective tinnitus of right ear     Past Surgical History:   Procedure Laterality Date    OTHER SURGICAL HISTORY  02/18/2021    Cochlear implant surgery    OTHER SURGICAL HISTORY  02/18/2021    Toe fracture repair    OTHER SURGICAL HISTORY  02/18/2021    Foot surgery     Family History   Problem Relation Name Age of Onset    Cancer Mother      COPD Father       Social History     Tobacco Use    Smoking status: Every Day     Current packs/day: 1.00     Types: Cigarettes     Passive exposure: Current    Smokeless tobacco: Former   Vaping Use    Vaping status: Never Used   Substance Use Topics    Alcohol use: Not Currently      Alcohol/week: 1.0 standard drink of alcohol     Types: 1 Cans of beer per week     Comment: socially    Drug use: Yes     Types: Marijuana       Physical Exam   ED Triage Vitals [12/01/24 1706]   Temperature Heart Rate Respirations BP   36.3 °C (97.4 °F) 91 16 (!) 172/101      Pulse Ox Temp Source Heart Rate Source Patient Position   98 % Temporal Monitor --      BP Location FiO2 (%)     -- --       Physical Exam  Vitals and nursing note reviewed.   Constitutional:       General: He is not in acute distress.     Appearance: Normal appearance. He is normal weight. He is not ill-appearing.   HENT:      Head: Normocephalic.      Right Ear: Tympanic membrane normal.      Left Ear: Tympanic membrane normal.      Nose: Nose normal.      Mouth/Throat:      Mouth: Mucous membranes are moist.   Eyes:      Extraocular Movements: Extraocular movements intact.      Pupils: Pupils are equal, round, and reactive to light.   Cardiovascular:      Rate and Rhythm: Normal rate and regular rhythm.      Pulses: Normal pulses.      Heart sounds: Normal heart sounds.   Pulmonary:      Effort: Pulmonary effort is normal.      Breath sounds: Normal breath sounds.   Abdominal:      General: Abdomen is flat. Bowel sounds are normal.      Palpations: Abdomen is soft.   Musculoskeletal:         General: Normal range of motion.      Cervical back: Normal range of motion. No tenderness.      Right lower leg: No edema.      Left lower leg: No edema.   Skin:     General: Skin is warm and dry.      Capillary Refill: Capillary refill takes less than 2 seconds.   Neurological:      General: No focal deficit present.      Mental Status: He is alert and oriented to person, place, and time.      Cranial Nerves: No cranial nerve deficit.      Sensory: No sensory deficit.      Motor: No weakness.   Psychiatric:         Mood and Affect: Mood normal.         Behavior: Behavior normal.       Labs Reviewed   POCT GLUCOSE - Abnormal       Result Value    POCT  Glucose 114 (*)      Pain Management Panel           No data to display              No orders to display     ED Course & MDM   Diagnoses as of 12/01/24 1937   Syncope, unspecified syncope type       Medical Decision Making  Patient presents emergency department after what sounds like a syncopal episode.  Patient is evaluated in the emergency department for acute electrolyte abnormality, infection, intracranial injury or other acute cause.  EKG, laboratory workup and CT head are obtained.  EKG was obtained at 5:06 PM.  It is sinus rhythm rate of 88.  No acute ST elevation.  TX interval is not prolonged and QTc is 414.  Some artifact limitation.  This is interpreted by myself.  CT head was performed to evaluate for intracranial lesion.  This is negative for acute process.  Patient is found to be orthostatic positive.  He was given an IV fluid bolus with improvement in vital signs.  He is now asymptomatic with standing.  Laboratory workup does not show remarkable abnormality that requires hospitalization.  No acute kidney injury or electrolyte abnormality.  He is mildly hyponatremic and should follow-up with his primary care physician.  At this time he does not meet admission criteria.  He is stable for discharge.  He is going to increase his fluid consumption and decrease his alcohol consumption.          Procedure  Procedures     Nicolle Ardon MD  12/01/24 1941

## 2024-12-01 NOTE — ED TRIAGE NOTES
Pt to ED via EMS c/o syncopal episode, does not remember event. Pt A&Ox4, answering questions appropriately. Pt [place]. Pt has skin tear to right upper arm. Pt non compliant with metoprolol per EMS. Pt denies any injury/pain.

## 2024-12-02 LAB — HOLD SPECIMEN: NORMAL

## 2024-12-10 ENCOUNTER — APPOINTMENT (OUTPATIENT)
Dept: PRIMARY CARE | Facility: CLINIC | Age: 69
End: 2024-12-10
Payer: MEDICARE

## 2025-01-17 ENCOUNTER — LAB (OUTPATIENT)
Dept: LAB | Facility: LAB | Age: 70
End: 2025-01-17
Payer: COMMERCIAL

## 2025-01-17 DIAGNOSIS — E87.1 HYPONATREMIA: ICD-10-CM

## 2025-01-17 DIAGNOSIS — E55.9 VITAMIN D DEFICIENCY: ICD-10-CM

## 2025-01-17 DIAGNOSIS — J42 CHRONIC BRONCHITIS, UNSPECIFIED CHRONIC BRONCHITIS TYPE (MULTI): ICD-10-CM

## 2025-01-17 LAB
25(OH)D3 SERPL-MCNC: 27 NG/ML (ref 30–100)
ALBUMIN SERPL BCP-MCNC: 4.2 G/DL (ref 3.4–5)
ALP SERPL-CCNC: 94 U/L (ref 33–136)
ALT SERPL W P-5'-P-CCNC: 12 U/L (ref 10–52)
ANION GAP SERPL CALC-SCNC: 13 MMOL/L (ref 10–20)
AST SERPL W P-5'-P-CCNC: 14 U/L (ref 9–39)
BILIRUB SERPL-MCNC: 0.7 MG/DL (ref 0–1.2)
BUN SERPL-MCNC: 9 MG/DL (ref 6–23)
CALCIUM SERPL-MCNC: 9.6 MG/DL (ref 8.6–10.3)
CHLORIDE SERPL-SCNC: 102 MMOL/L (ref 98–107)
CO2 SERPL-SCNC: 29 MMOL/L (ref 21–32)
CREAT SERPL-MCNC: 0.98 MG/DL (ref 0.5–1.3)
EGFRCR SERPLBLD CKD-EPI 2021: 85 ML/MIN/1.73M*2
GLUCOSE SERPL-MCNC: 97 MG/DL (ref 74–99)
POTASSIUM SERPL-SCNC: 4.6 MMOL/L (ref 3.5–5.3)
PROT SERPL-MCNC: 6.5 G/DL (ref 6.4–8.2)
SODIUM SERPL-SCNC: 139 MMOL/L (ref 136–145)

## 2025-01-28 ENCOUNTER — APPOINTMENT (OUTPATIENT)
Dept: PRIMARY CARE | Facility: CLINIC | Age: 70
End: 2025-01-28
Payer: COMMERCIAL

## 2025-01-28 VITALS
OXYGEN SATURATION: 97 % | BODY MASS INDEX: 20.48 KG/M2 | WEIGHT: 151 LBS | RESPIRATION RATE: 14 BRPM | HEART RATE: 57 BPM | SYSTOLIC BLOOD PRESSURE: 138 MMHG | DIASTOLIC BLOOD PRESSURE: 89 MMHG | TEMPERATURE: 98.3 F

## 2025-01-28 DIAGNOSIS — E87.1 HYPONATREMIA: ICD-10-CM

## 2025-01-28 DIAGNOSIS — R55 VASOVAGAL SYNCOPE: ICD-10-CM

## 2025-01-28 DIAGNOSIS — J42 CHRONIC BRONCHITIS, UNSPECIFIED CHRONIC BRONCHITIS TYPE (MULTI): Primary | ICD-10-CM

## 2025-01-28 DIAGNOSIS — E55.9 VITAMIN D DEFICIENCY: ICD-10-CM

## 2025-01-28 PROCEDURE — 1160F RVW MEDS BY RX/DR IN RCRD: CPT | Performed by: FAMILY MEDICINE

## 2025-01-28 PROCEDURE — G2211 COMPLEX E/M VISIT ADD ON: HCPCS | Performed by: FAMILY MEDICINE

## 2025-01-28 PROCEDURE — 1159F MED LIST DOCD IN RCRD: CPT | Performed by: FAMILY MEDICINE

## 2025-01-28 PROCEDURE — 99214 OFFICE O/P EST MOD 30 MIN: CPT | Performed by: FAMILY MEDICINE

## 2025-01-28 PROCEDURE — 4004F PT TOBACCO SCREEN RCVD TLK: CPT | Performed by: FAMILY MEDICINE

## 2025-01-28 PROCEDURE — 1124F ACP DISCUSS-NO DSCNMKR DOCD: CPT | Performed by: FAMILY MEDICINE

## 2025-01-28 RX ORDER — ALBUTEROL SULFATE 90 UG/1
2 INHALANT RESPIRATORY (INHALATION) EVERY 6 HOURS PRN
Qty: 18 G | Refills: 5 | Status: SHIPPED | OUTPATIENT
Start: 2025-01-28 | End: 2026-01-28

## 2025-01-28 RX ORDER — FLUTICASONE PROPIONATE AND SALMETEROL 250; 50 UG/1; UG/1
1 POWDER RESPIRATORY (INHALATION)
Qty: 60 EACH | Refills: 5 | Status: SHIPPED | OUTPATIENT
Start: 2025-01-28 | End: 2026-01-28

## 2025-01-28 ASSESSMENT — ENCOUNTER SYMPTOMS
CONFUSION: 0
ABDOMINAL PAIN: 0
PALPITATIONS: 0
CHEST TIGHTNESS: 0
SHORTNESS OF BREATH: 0
ARTHRALGIAS: 0
CHILLS: 0
FEVER: 0

## 2025-01-28 NOTE — ASSESSMENT & PLAN NOTE
ER workup reviewed no acute issues noted test results unremarkable.    We discussed potential effects of excess alcohol intake.    We discussed that other neurological and cardiac events could have occurred.  Recommended referral to cardiology and/or neurology.  At this point patient and wife are declining.  Discussed other testing that could be performed again at this point they are declining.  If symptoms recur they will return to the ER notify office and reconsider further evaluation.

## 2025-01-28 NOTE — PROGRESS NOTES
Subjective   Patient ID: Isaac Condon is a 69 y.o. male who presents for Follow-up (4 month).    HPI patient today with his wife for follow-up of ongoing healthcare issues for the most part been doing okay except for over a month ago watching football game and excessive intake of alcohol.  He got up to go to the refrigerator became weak and fell to the ground.  Wife called 911 they were able to eventually get the patient agreed to go to the hospital ER evaluation completed which included diagnostic laboratory studies EKG as well as head CT.  No acute issues were noted elevated alcohol level as well as hyponatremia.  Clinically he was stable and they let him go home.  Wife felt that his speech was somewhat off for several days afterwards.  But at this point she says he is back to himself.  Patient would agree he feels fine.  Also she points out that he is not using his cochlear implant.  Complaining that he cannot get used to the background noise.  Review of Systems   Constitutional:  Negative for chills and fever.   HENT:  Positive for hearing loss. Negative for congestion and ear pain.    Eyes:  Negative for visual disturbance.   Respiratory:  Negative for chest tightness and shortness of breath.    Cardiovascular:  Negative for chest pain and palpitations.   Gastrointestinal:  Negative for abdominal pain.   Musculoskeletal:  Negative for arthralgias.   Skin:  Negative for pallor.   Neurological:  Positive for syncope.   Psychiatric/Behavioral:  Negative for confusion.        Objective   /89   Pulse 57   Temp 36.8 °C (98.3 °F)   Resp 14   Wt 68.5 kg (151 lb)   SpO2 97%   BMI 20.48 kg/m²     Physical Exam  Vitals and nursing note reviewed.   Constitutional:       General: He is not in acute distress.     Appearance: Normal appearance. He is not ill-appearing.   HENT:      Head: Normocephalic and atraumatic.      Right Ear: Tympanic membrane, ear canal and external ear normal.      Left Ear: Tympanic  membrane, ear canal and external ear normal.      Mouth/Throat:      Pharynx: Oropharynx is clear.   Eyes:      Extraocular Movements: Extraocular movements intact.   Cardiovascular:      Rate and Rhythm: Normal rate and regular rhythm.      Pulses: Normal pulses.      Heart sounds: Normal heart sounds.   Pulmonary:      Effort: Pulmonary effort is normal.      Breath sounds: Normal breath sounds.   Abdominal:      General: Abdomen is flat. Bowel sounds are normal.      Palpations: Abdomen is soft.      Tenderness: There is no abdominal tenderness.   Musculoskeletal:         General: Normal range of motion.      Cervical back: Neck supple.   Skin:     General: Skin is warm.   Neurological:      Mental Status: He is alert and oriented to person, place, and time. Mental status is at baseline.   Psychiatric:         Mood and Affect: Mood normal.       Recent Results (from the past 9 weeks)   POCT GLUCOSE    Collection Time: 12/01/24  5:13 PM   Result Value Ref Range    POCT Glucose 114 (H) 74 - 99 mg/dL   CBC and Auto Differential    Collection Time: 12/01/24  5:21 PM   Result Value Ref Range    WBC 6.8 4.4 - 11.3 x10*3/uL    nRBC 0.0 0.0 - 0.0 /100 WBCs    RBC 4.77 4.50 - 5.90 x10*6/uL    Hemoglobin 16.3 13.5 - 17.5 g/dL    Hematocrit 45.4 41.0 - 52.0 %    MCV 95 80 - 100 fL    MCH 34.2 (H) 26.0 - 34.0 pg    MCHC 35.9 32.0 - 36.0 g/dL    RDW 11.9 11.5 - 14.5 %    Platelets 180 150 - 450 x10*3/uL    Neutrophils % 61.6 40.0 - 80.0 %    Immature Granulocytes %, Automated 0.4 0.0 - 0.9 %    Lymphocytes % 26.4 13.0 - 44.0 %    Monocytes % 8.1 2.0 - 10.0 %    Eosinophils % 2.6 0.0 - 6.0 %    Basophils % 0.9 0.0 - 2.0 %    Neutrophils Absolute 4.21 1.20 - 7.70 x10*3/uL    Immature Granulocytes Absolute, Automated 0.03 0.00 - 0.70 x10*3/uL    Lymphocytes Absolute 1.80 1.20 - 4.80 x10*3/uL    Monocytes Absolute 0.55 0.10 - 1.00 x10*3/uL    Eosinophils Absolute 0.18 0.00 - 0.70 x10*3/uL    Basophils Absolute 0.06 0.00 - 0.10  x10*3/uL   Comprehensive metabolic panel    Collection Time: 12/01/24  5:21 PM   Result Value Ref Range    Glucose 105 (H) 74 - 99 mg/dL    Sodium 130 (L) 136 - 145 mmol/L    Potassium 3.5 3.5 - 5.3 mmol/L    Chloride 96 (L) 98 - 107 mmol/L    Bicarbonate 24 21 - 32 mmol/L    Anion Gap 14 10 - 20 mmol/L    Urea Nitrogen 5 (L) 6 - 23 mg/dL    Creatinine 0.87 0.50 - 1.30 mg/dL    eGFR >90 >60 mL/min/1.73m*2    Calcium 9.0 8.6 - 10.3 mg/dL    Albumin 4.1 3.4 - 5.0 g/dL    Alkaline Phosphatase 117 33 - 136 U/L    Total Protein 6.7 6.4 - 8.2 g/dL    AST 20 9 - 39 U/L    Bilirubin, Total 0.7 0.0 - 1.2 mg/dL    ALT 20 10 - 52 U/L   Ethanol    Collection Time: 12/01/24  5:21 PM   Result Value Ref Range    Alcohol 46 (H) <=10 mg/dL   Urinalysis with Reflex Culture and Microscopic    Collection Time: 12/01/24  7:06 PM   Result Value Ref Range    Color, Urine Light-Yellow Light-Yellow, Yellow, Dark-Yellow    Appearance, Urine Clear Clear    Specific Gravity, Urine 1.006 1.005 - 1.035    pH, Urine 6.0 5.0, 5.5, 6.0, 6.5, 7.0, 7.5, 8.0    Protein, Urine NEGATIVE NEGATIVE, 10 (TRACE), 20 (TRACE) mg/dL    Glucose, Urine Normal Normal mg/dL    Blood, Urine NEGATIVE NEGATIVE    Ketones, Urine NEGATIVE NEGATIVE mg/dL    Bilirubin, Urine NEGATIVE NEGATIVE    Urobilinogen, Urine Normal Normal mg/dL    Nitrite, Urine NEGATIVE NEGATIVE    Leukocyte Esterase, Urine NEGATIVE NEGATIVE   Extra Urine Gray Tube    Collection Time: 12/01/24  7:06 PM   Result Value Ref Range    Extra Tube Hold for add-ons.    Comprehensive Metabolic Panel    Collection Time: 01/17/25  9:45 AM   Result Value Ref Range    Glucose 97 74 - 99 mg/dL    Sodium 139 136 - 145 mmol/L    Potassium 4.6 3.5 - 5.3 mmol/L    Chloride 102 98 - 107 mmol/L    Bicarbonate 29 21 - 32 mmol/L    Anion Gap 13 10 - 20 mmol/L    Urea Nitrogen 9 6 - 23 mg/dL    Creatinine 0.98 0.50 - 1.30 mg/dL    eGFR 85 >60 mL/min/1.73m*2    Calcium 9.6 8.6 - 10.3 mg/dL    Albumin 4.2 3.4 - 5.0 g/dL     Alkaline Phosphatase 94 33 - 136 U/L    Total Protein 6.5 6.4 - 8.2 g/dL    AST 14 9 - 39 U/L    Bilirubin, Total 0.7 0.0 - 1.2 mg/dL    ALT 12 10 - 52 U/L   Vitamin D 25-Hydroxy,Total (for eval of Vitamin D levels)    Collection Time: 01/17/25  9:45 AM   Result Value Ref Range    Vitamin D, 25-Hydroxy, Total 27 (L) 30 - 100 ng/mL     Recent labs ER reports and diagnostic test reviewed with patient and wife.    Restart vitamin D3  Hyponatremia is improved    Try to remain off alcohol and stay well-hydrated.    Again we discussed referral to cardiology and neurology for further evaluation of recent syncopal event.  They verbalized understanding of potential underlying medical conditions that could be more serious.  But at this point patient and wife not interested in pursuing.    Return to office 4 months with repeat fasting labs      Assessment/Plan   Problem List Items Addressed This Visit             ICD-10-CM    Chronic bronchitis (Multi) - Primary J42     Refill Advair and albuterol inhalers  Unfortunately patient continues to smoke         Relevant Medications    albuterol 90 mcg/actuation inhaler    fluticasone propion-salmeteroL (Advair Diskus) 250-50 mcg/dose diskus inhaler    Other Relevant Orders    Follow Up In Primary Care - Established    Hyponatremia E87.1     Sodium back in normal range possible exacerbation from alcohol intake going to the ER.         Relevant Orders    Follow Up In Primary Care - Established    Comprehensive Metabolic Panel    Vitamin D deficiency E55.9     Continue to monitor and supplement         Relevant Orders    Follow Up In Primary Care - Established    Comprehensive Metabolic Panel    Vitamin D 25-Hydroxy,Total (for eval of Vitamin D levels)    Vasovagal syncope R55     ER workup reviewed no acute issues noted test results unremarkable.    We discussed potential effects of excess alcohol intake.    We discussed that other neurological and cardiac events could have  occurred.  Recommended referral to cardiology and/or neurology.  At this point patient and wife are declining.  Discussed other testing that could be performed again at this point they are declining.  If symptoms recur they will return to the ER notify office and reconsider further evaluation.             Had

## 2025-03-16 ENCOUNTER — HOSPITAL ENCOUNTER (INPATIENT)
Facility: HOSPITAL | Age: 70
End: 2025-03-16
Attending: STUDENT IN AN ORGANIZED HEALTH CARE EDUCATION/TRAINING PROGRAM | Admitting: INTERNAL MEDICINE
Payer: MEDICARE

## 2025-03-16 ENCOUNTER — APPOINTMENT (OUTPATIENT)
Dept: RADIOLOGY | Facility: HOSPITAL | Age: 70
End: 2025-03-16
Payer: MEDICARE

## 2025-03-16 ENCOUNTER — APPOINTMENT (OUTPATIENT)
Dept: CARDIOLOGY | Facility: HOSPITAL | Age: 70
End: 2025-03-16
Payer: MEDICARE

## 2025-03-16 VITALS
WEIGHT: 160 LBS | HEART RATE: 79 BPM | OXYGEN SATURATION: 91 % | TEMPERATURE: 98.6 F | DIASTOLIC BLOOD PRESSURE: 97 MMHG | HEIGHT: 72 IN | RESPIRATION RATE: 18 BRPM | SYSTOLIC BLOOD PRESSURE: 162 MMHG | BODY MASS INDEX: 21.67 KG/M2

## 2025-03-16 DIAGNOSIS — R09.89 OTHER SPECIFIED SYMPTOMS AND SIGNS INVOLVING THE CIRCULATORY AND RESPIRATORY SYSTEMS: ICD-10-CM

## 2025-03-16 DIAGNOSIS — I10 PRIMARY HYPERTENSION: ICD-10-CM

## 2025-03-16 DIAGNOSIS — R47.01 ATAXIC APHASIA: ICD-10-CM

## 2025-03-16 DIAGNOSIS — R55 VASOVAGAL SYNCOPE: ICD-10-CM

## 2025-03-16 DIAGNOSIS — I65.23 BILATERAL CAROTID ARTERY STENOSIS: ICD-10-CM

## 2025-03-16 DIAGNOSIS — I63.9 CEREBROVASCULAR ACCIDENT (CVA), UNSPECIFIED MECHANISM (MULTI): Primary | ICD-10-CM

## 2025-03-16 LAB
ALBUMIN SERPL BCP-MCNC: 4.5 G/DL (ref 3.4–5)
ALP SERPL-CCNC: 101 U/L (ref 33–136)
ALT SERPL W P-5'-P-CCNC: 16 U/L (ref 10–52)
ANION GAP SERPL CALC-SCNC: 17 MMOL/L (ref 10–20)
AST SERPL W P-5'-P-CCNC: 18 U/L (ref 9–39)
BILIRUB SERPL-MCNC: 1.2 MG/DL (ref 0–1.2)
BNP SERPL-MCNC: 28 PG/ML (ref 0–99)
BUN SERPL-MCNC: 10 MG/DL (ref 6–23)
CALCIUM SERPL-MCNC: 9.9 MG/DL (ref 8.6–10.3)
CHLORIDE SERPL-SCNC: 99 MMOL/L (ref 98–107)
CHOLEST SERPL-MCNC: 213 MG/DL (ref 0–199)
CHOLESTEROL/HDL RATIO: 1.9
CO2 SERPL-SCNC: 25 MMOL/L (ref 21–32)
CREAT SERPL-MCNC: 0.97 MG/DL (ref 0.5–1.3)
EGFRCR SERPLBLD CKD-EPI 2021: 85 ML/MIN/1.73M*2
ERYTHROCYTE [DISTWIDTH] IN BLOOD BY AUTOMATED COUNT: 12.6 % (ref 11.5–14.5)
EST. AVERAGE GLUCOSE BLD GHB EST-MCNC: 80 MG/DL
ETHANOL SERPL-MCNC: <10 MG/DL
GLUCOSE BLD MANUAL STRIP-MCNC: 70 MG/DL (ref 74–99)
GLUCOSE BLD MANUAL STRIP-MCNC: 82 MG/DL (ref 74–99)
GLUCOSE BLD MANUAL STRIP-MCNC: 88 MG/DL (ref 74–99)
GLUCOSE SERPL-MCNC: 90 MG/DL (ref 74–99)
HBA1C MFR BLD: 4.4 %
HCT VFR BLD AUTO: 47.5 % (ref 41–52)
HDLC SERPL-MCNC: 110.7 MG/DL
HGB BLD-MCNC: 16.7 G/DL (ref 13.5–17.5)
INR PPP: 0.9 (ref 0.9–1.1)
LDLC SERPL CALC-MCNC: 88 MG/DL
MCH RBC QN AUTO: 33.7 PG (ref 26–34)
MCHC RBC AUTO-ENTMCNC: 35.2 G/DL (ref 32–36)
MCV RBC AUTO: 96 FL (ref 80–100)
NON HDL CHOLESTEROL: 102 MG/DL (ref 0–149)
NRBC BLD-RTO: 0 /100 WBCS (ref 0–0)
PLATELET # BLD AUTO: 164 X10*3/UL (ref 150–450)
POTASSIUM SERPL-SCNC: 3.8 MMOL/L (ref 3.5–5.3)
PROT SERPL-MCNC: 7.2 G/DL (ref 6.4–8.2)
PROTHROMBIN TIME: 10 SECONDS (ref 9.8–12.4)
RBC # BLD AUTO: 4.96 X10*6/UL (ref 4.5–5.9)
SODIUM SERPL-SCNC: 137 MMOL/L (ref 136–145)
TRIGL SERPL-MCNC: 71 MG/DL (ref 0–149)
VLDL: 14 MG/DL (ref 0–40)
WBC # BLD AUTO: 9.3 X10*3/UL (ref 4.4–11.3)

## 2025-03-16 PROCEDURE — 82947 ASSAY GLUCOSE BLOOD QUANT: CPT

## 2025-03-16 PROCEDURE — 80061 LIPID PANEL: CPT | Performed by: NURSE PRACTITIONER

## 2025-03-16 PROCEDURE — 2500000001 HC RX 250 WO HCPCS SELF ADMINISTERED DRUGS (ALT 637 FOR MEDICARE OP): Performed by: NURSE PRACTITIONER

## 2025-03-16 PROCEDURE — 82077 ASSAY SPEC XCP UR&BREATH IA: CPT | Performed by: STUDENT IN AN ORGANIZED HEALTH CARE EDUCATION/TRAINING PROGRAM

## 2025-03-16 PROCEDURE — 85610 PROTHROMBIN TIME: CPT | Performed by: STUDENT IN AN ORGANIZED HEALTH CARE EDUCATION/TRAINING PROGRAM

## 2025-03-16 PROCEDURE — 83880 ASSAY OF NATRIURETIC PEPTIDE: CPT | Performed by: NURSE PRACTITIONER

## 2025-03-16 PROCEDURE — 70450 CT HEAD/BRAIN W/O DYE: CPT

## 2025-03-16 PROCEDURE — 80053 COMPREHEN METABOLIC PANEL: CPT | Performed by: STUDENT IN AN ORGANIZED HEALTH CARE EDUCATION/TRAINING PROGRAM

## 2025-03-16 PROCEDURE — 99222 1ST HOSP IP/OBS MODERATE 55: CPT | Performed by: NURSE PRACTITIONER

## 2025-03-16 PROCEDURE — 99285 EMERGENCY DEPT VISIT HI MDM: CPT | Mod: 25 | Performed by: STUDENT IN AN ORGANIZED HEALTH CARE EDUCATION/TRAINING PROGRAM

## 2025-03-16 PROCEDURE — 93005 ELECTROCARDIOGRAM TRACING: CPT

## 2025-03-16 PROCEDURE — 1200000002 HC GENERAL ROOM WITH TELEMETRY DAILY

## 2025-03-16 PROCEDURE — 83036 HEMOGLOBIN GLYCOSYLATED A1C: CPT | Mod: PORLAB | Performed by: NURSE PRACTITIONER

## 2025-03-16 PROCEDURE — 85027 COMPLETE CBC AUTOMATED: CPT | Performed by: STUDENT IN AN ORGANIZED HEALTH CARE EDUCATION/TRAINING PROGRAM

## 2025-03-16 PROCEDURE — 71045 X-RAY EXAM CHEST 1 VIEW: CPT | Performed by: RADIOLOGY

## 2025-03-16 PROCEDURE — 36415 COLL VENOUS BLD VENIPUNCTURE: CPT | Performed by: STUDENT IN AN ORGANIZED HEALTH CARE EDUCATION/TRAINING PROGRAM

## 2025-03-16 PROCEDURE — 70450 CT HEAD/BRAIN W/O DYE: CPT | Performed by: RADIOLOGY

## 2025-03-16 PROCEDURE — 71045 X-RAY EXAM CHEST 1 VIEW: CPT

## 2025-03-16 PROCEDURE — 2500000001 HC RX 250 WO HCPCS SELF ADMINISTERED DRUGS (ALT 637 FOR MEDICARE OP): Performed by: STUDENT IN AN ORGANIZED HEALTH CARE EDUCATION/TRAINING PROGRAM

## 2025-03-16 RX ORDER — CLOPIDOGREL BISULFATE 75 MG/1
75 TABLET ORAL DAILY
Status: DISCONTINUED | OUTPATIENT
Start: 2025-03-17 | End: 2025-03-19 | Stop reason: HOSPADM

## 2025-03-16 RX ORDER — AMOXICILLIN 250 MG
1 CAPSULE ORAL NIGHTLY
Status: DISCONTINUED | OUTPATIENT
Start: 2025-03-16 | End: 2025-03-19 | Stop reason: HOSPADM

## 2025-03-16 RX ORDER — NAPROXEN SODIUM 220 MG/1
324 TABLET, FILM COATED ORAL ONCE
Status: COMPLETED | OUTPATIENT
Start: 2025-03-16 | End: 2025-03-16

## 2025-03-16 RX ORDER — ACETAMINOPHEN 325 MG/1
650 TABLET ORAL EVERY 4 HOURS PRN
Status: DISCONTINUED | OUTPATIENT
Start: 2025-03-16 | End: 2025-03-19 | Stop reason: HOSPADM

## 2025-03-16 RX ORDER — CLOPIDOGREL BISULFATE 75 MG/1
300 TABLET ORAL ONCE
Status: DISCONTINUED | OUTPATIENT
Start: 2025-03-16 | End: 2025-03-19 | Stop reason: HOSPADM

## 2025-03-16 RX ORDER — ATORVASTATIN CALCIUM 40 MG/1
40 TABLET, FILM COATED ORAL NIGHTLY
Status: DISCONTINUED | OUTPATIENT
Start: 2025-03-16 | End: 2025-03-19 | Stop reason: HOSPADM

## 2025-03-16 RX ORDER — ASPIRIN 81 MG/1
81 TABLET ORAL DAILY
Status: DISCONTINUED | OUTPATIENT
Start: 2025-03-17 | End: 2025-03-19 | Stop reason: HOSPADM

## 2025-03-16 RX ORDER — LABETALOL HYDROCHLORIDE 5 MG/ML
10 INJECTION, SOLUTION INTRAVENOUS EVERY 10 MIN PRN
Status: ACTIVE | OUTPATIENT
Start: 2025-03-16 | End: 2025-03-18

## 2025-03-16 RX ORDER — ALBUTEROL SULFATE 90 UG/1
2 INHALANT RESPIRATORY (INHALATION) EVERY 6 HOURS PRN
Status: DISCONTINUED | OUTPATIENT
Start: 2025-03-16 | End: 2025-03-19 | Stop reason: HOSPADM

## 2025-03-16 RX ADMIN — ATORVASTATIN CALCIUM 40 MG: 40 TABLET, FILM COATED ORAL at 21:00

## 2025-03-16 RX ADMIN — SENNOSIDES AND DOCUSATE SODIUM 1 TABLET: 8.6; 5 TABLET ORAL at 21:00

## 2025-03-16 RX ADMIN — ASPIRIN 81 MG CHEWABLE TABLET 324 MG: 81 TABLET CHEWABLE at 16:47

## 2025-03-16 SDOH — SOCIAL STABILITY: SOCIAL INSECURITY: WITHIN THE LAST YEAR, HAVE YOU BEEN HUMILIATED OR EMOTIONALLY ABUSED IN OTHER WAYS BY YOUR PARTNER OR EX-PARTNER?: NO

## 2025-03-16 SDOH — SOCIAL STABILITY: SOCIAL INSECURITY
WITHIN THE LAST YEAR, HAVE YOU BEEN RAPED OR FORCED TO HAVE ANY KIND OF SEXUAL ACTIVITY BY YOUR PARTNER OR EX-PARTNER?: NO

## 2025-03-16 SDOH — ECONOMIC STABILITY: INCOME INSECURITY
IN THE PAST 12 MONTHS HAS THE ELECTRIC, GAS, OIL, OR WATER COMPANY THREATENED TO SHUT OFF SERVICES IN YOUR HOME?: PATIENT UNABLE TO ANSWER

## 2025-03-16 SDOH — SOCIAL STABILITY: SOCIAL INSECURITY: HAVE YOU HAD THOUGHTS OF HARMING ANYONE ELSE?: NO

## 2025-03-16 SDOH — SOCIAL STABILITY: SOCIAL INSECURITY
WITHIN THE LAST YEAR, HAVE YOU BEEN KICKED, HIT, SLAPPED, OR OTHERWISE PHYSICALLY HURT BY YOUR PARTNER OR EX-PARTNER?: NO

## 2025-03-16 SDOH — SOCIAL STABILITY: SOCIAL INSECURITY: WITHIN THE LAST YEAR, HAVE YOU BEEN AFRAID OF YOUR PARTNER OR EX-PARTNER?: NO

## 2025-03-16 SDOH — SOCIAL STABILITY: SOCIAL INSECURITY: WERE YOU ABLE TO COMPLETE ALL THE BEHAVIORAL HEALTH SCREENINGS?: YES

## 2025-03-16 SDOH — ECONOMIC STABILITY: FOOD INSECURITY
WITHIN THE PAST 12 MONTHS, THE FOOD YOU BOUGHT JUST DIDN'T LAST AND YOU DIDN'T HAVE MONEY TO GET MORE.: PATIENT UNABLE TO ANSWER

## 2025-03-16 SDOH — ECONOMIC STABILITY: FOOD INSECURITY
WITHIN THE PAST 12 MONTHS, YOU WORRIED THAT YOUR FOOD WOULD RUN OUT BEFORE YOU GOT THE MONEY TO BUY MORE.: PATIENT UNABLE TO ANSWER

## 2025-03-16 ASSESSMENT — PATIENT HEALTH QUESTIONNAIRE - PHQ9
SUM OF ALL RESPONSES TO PHQ9 QUESTIONS 1 & 2: 0
2. FEELING DOWN, DEPRESSED OR HOPELESS: NOT AT ALL
1. LITTLE INTEREST OR PLEASURE IN DOING THINGS: NOT AT ALL

## 2025-03-16 ASSESSMENT — COGNITIVE AND FUNCTIONAL STATUS - GENERAL
CLIMB 3 TO 5 STEPS WITH RAILING: A LITTLE
CLIMB 3 TO 5 STEPS WITH RAILING: A LITTLE
DRESSING REGULAR LOWER BODY CLOTHING: A LITTLE
MOVING TO AND FROM BED TO CHAIR: A LITTLE
HELP NEEDED FOR BATHING: A LITTLE
EATING MEALS: A LITTLE
PERSONAL GROOMING: A LITTLE
TOILETING: A LITTLE
DRESSING REGULAR UPPER BODY CLOTHING: A LITTLE
DAILY ACTIVITIY SCORE: 18
EATING MEALS: A LITTLE
WALKING IN HOSPITAL ROOM: A LITTLE
WALKING IN HOSPITAL ROOM: A LITTLE
HELP NEEDED FOR BATHING: A LITTLE
TURNING FROM BACK TO SIDE WHILE IN FLAT BAD: A LITTLE
STANDING UP FROM CHAIR USING ARMS: A LITTLE
MOBILITY SCORE: 18
PATIENT BASELINE BEDBOUND: NO
MOVING FROM LYING ON BACK TO SITTING ON SIDE OF FLAT BED WITH BEDRAILS: A LITTLE
MOBILITY SCORE: 21
TOILETING: A LITTLE
MOVING TO AND FROM BED TO CHAIR: A LITTLE
DRESSING REGULAR LOWER BODY CLOTHING: A LITTLE
PERSONAL GROOMING: A LITTLE
DAILY ACTIVITIY SCORE: 19

## 2025-03-16 ASSESSMENT — ACTIVITIES OF DAILY LIVING (ADL)
FEEDING YOURSELF: NEEDS ASSISTANCE
GROOMING: NEEDS ASSISTANCE
PATIENT'S MEMORY ADEQUATE TO SAFELY COMPLETE DAILY ACTIVITIES?: NO
DRESSING YOURSELF: NEEDS ASSISTANCE
HEARING - LEFT EAR: DIFFICULTY WITH NOISE
ADEQUATE_TO_COMPLETE_ADL: YES
TOILETING: NEEDS ASSISTANCE
WALKS IN HOME: NEEDS ASSISTANCE
JUDGMENT_ADEQUATE_SAFELY_COMPLETE_DAILY_ACTIVITIES: YES
ASSISTIVE_DEVICE: DENTURES LOWER;DENTURES UPPER
HEARING - RIGHT EAR: DIFFICULTY WITH NOISE
BATHING: NEEDS ASSISTANCE
LACK_OF_TRANSPORTATION: PATIENT UNABLE TO ANSWER

## 2025-03-16 ASSESSMENT — LIFESTYLE VARIABLES
SUBSTANCE_ABUSE_PAST_12_MONTHS: NO
HOW OFTEN DO YOU HAVE A DRINK CONTAINING ALCOHOL: PATIENT UNABLE TO ANSWER
SKIP TO QUESTIONS 9-10: 0
AUDIT-C TOTAL SCORE: -1
PRESCIPTION_ABUSE_PAST_12_MONTHS: NO
AUDIT-C TOTAL SCORE: -1
HOW OFTEN DO YOU HAVE 6 OR MORE DRINKS ON ONE OCCASION: PATIENT UNABLE TO ANSWER
HOW MANY STANDARD DRINKS CONTAINING ALCOHOL DO YOU HAVE ON A TYPICAL DAY: PATIENT UNABLE TO ANSWER

## 2025-03-16 ASSESSMENT — PAIN SCALES - GENERAL
PAINLEVEL_OUTOF10: 0 - NO PAIN
PAINLEVEL_OUTOF10: 0 - NO PAIN

## 2025-03-16 ASSESSMENT — PAIN - FUNCTIONAL ASSESSMENT: PAIN_FUNCTIONAL_ASSESSMENT: 0-10

## 2025-03-16 NOTE — ED PROVIDER NOTES
HPI   Chief Complaint   Patient presents with    Stroke       Isaac Condon is a 70-year-old male present emerged part with concern for strokelike symptoms.  Patient had symptoms Friday morning he was having difficulty coordinating his movements difficulty ambulating he left-sided facial droop and dysarthria.  Patient's fairly member stated that he had difficulty coordinating to eat and drink.  She stated that he did have a CVA in December he came by EMS was discharged home.  Has not followed up does not take any medication.  Patient is not followed regularly with a primary care physician and does not have any known medical history per family member.  Patient denies any trauma      History provided by:  Patient, medical records and relative          Patient History   Past Medical History:   Diagnosis Date    Bronchitis      Past Surgical History:   Procedure Laterality Date    COCHLEAR IMPLANT      FOOT SURGERY      toe fracture repair     Family History   Problem Relation Name Age of Onset    Cancer Mother      COPD Father       Social History     Tobacco Use    Smoking status: Every Day     Current packs/day: 1.00     Types: Cigarettes     Passive exposure: Current    Smokeless tobacco: Former   Vaping Use    Vaping status: Never Used   Substance Use Topics    Alcohol use: Not Currently     Alcohol/week: 1.0 standard drink of alcohol     Types: 1 Cans of beer per week     Comment: socially    Drug use: Yes     Types: Marijuana       Physical Exam   ED Triage Vitals [03/16/25 1407]   Temperature Heart Rate Respirations BP   36.1 °C (97 °F) 96 20 (!) 179/109      Pulse Ox Temp Source Heart Rate Source Patient Position   (!) 90 % Temporal Monitor --      BP Location FiO2 (%)     -- --       Physical Exam  Constitutional:       General: He is not in acute distress.     Appearance: He is ill-appearing.      Comments: Chronically ill in appearance   HENT:      Head: Normocephalic and atraumatic.      Mouth/Throat:       Pharynx: Oropharynx is clear.   Eyes:      Conjunctiva/sclera: Conjunctivae normal.      Pupils: Pupils are equal, round, and reactive to light.   Cardiovascular:      Rate and Rhythm: Normal rate and regular rhythm.   Abdominal:      General: Bowel sounds are normal. There is no distension.      Palpations: Abdomen is soft.      Tenderness: There is no abdominal tenderness. There is no guarding or rebound.   Musculoskeletal:         General: Normal range of motion.      Cervical back: Normal range of motion and neck supple.      Right lower leg: No edema.      Left lower leg: No edema.   Skin:     General: Skin is warm and dry.   Neurological:      Mental Status: He is alert.      Comments: Left-sided facial droop, neglect that can be overcome cannot fully look to the left, dysarthria, bilateral ataxia   Psychiatric:         Mood and Affect: Mood normal.           ED Course & MDM   ED Course as of 03/16/25 1706   Sun Mar 16, 2025   1459 EKG was reviewed interpreted by myself, EKG was significant for sinus rhythm with a rate of 79, normal axis, QTc 428 no previous EKG [SS]   1704 WBC: 9.3  Patient is not a leukocytosis, normal sodium potassium level [SS]      ED Course User Index  [SS] Tamika Sung MD         Diagnoses as of 03/16/25 1706   Cerebrovascular accident (CVA), unspecified mechanism (Multi)                 No data recorded     [place] Coma Scale Score: 15 (03/16/25 1412 : Monisha Salvador, RN)       NIH Stroke Scale: 4 (03/16/25 1630 : Shelby Cortés, JOSE)                   Medical Decision Making  Isaac Condon is a 70-year-old male who presented to emergency department stroke like symptoms.   Vital signs reviewed interpreted patient was afebrile, nontachycardic, normal blood pressure  CT head did not show acute abnormality  NIH 12  Patient was ordered p.o. aspirin.  Patient will be admitted for strokelike symptoms  Patient was in sinus rhythm on EKG  Lab work was reviewed and interpreted patient  has stable renal function,  Chest x-ray was reviewed interpreted by myself confirmed radiology be unremarkable for acute process  Patient symptoms have been present for at least 48 hours patient is not a candidate to consider nathan alert or stroke alert    History was provided by patient  Patient's family denies any known comorbidities.  Patient also does not follow with primary care  Differential diagnosis includes was not limited to hemorrhagic stroke, ischemic stroke, electrolyte abnormality, ACS, pneumonia, CHF, UTI, Bell's palsy,  Social determinants of health include chronic illness, tobacco use  Consult placed to medicine        XR chest 1 view   Final Result   1.  No evidence of acute cardiopulmonary process.                  MACRO:   None        Signed by: Jose Johnson 3/16/2025 2:58 PM   Dictation workstation:   [id number]      CT head wo IV contrast   Final Result   NO ACUTE INTRACRANIAL PROCESS        MACRO:   None        Signed by: Cresencio Wilder 3/16/2025 2:30 PM   Dictation workstation:   [id number]      Transthoracic Echo (TTE) Complete    (Results Pending)   MR brain w and wo IV contrast    (Results Pending)         Procedure  Procedures     Tamika Sung MD  03/16/25 1702

## 2025-03-16 NOTE — NURSING NOTE
Patient admitted in to room 2333.   A&Ox4, oriented to room and call light.   Admission paperwork and med rec completed.  Assessment as charted.

## 2025-03-16 NOTE — H&P
[facility] MEDICINE HISTORY AND PHYSICAL    History Of Present Illness     Isaac Condon is a 70 y.o. male with PMHx of bronchitis and HTN who presented with left-sided facial drooping with drooling, dysarthia and bilateral ataxia, onset two days PTA. That morning he was having difficulty coordinating his movements, with difficulty ambulating. He had difficulty eating and drinking. He refused to seek medical attention and his wife finally convinced him to come to the ED today.  Family member stated that he had a stroke in December. (He came to the ED 12/1/24 for syncope and was orthostatic positive. ETOH was 46. He improved with IV fluids and was advised to increase his fluid consumption and decrease his alcohol consumption. CTH 12/1/24 revealed no evidence of acute intracranial hemorrhage or mass effect).    In the ED labwork was benign. CTH and CXR were nonacute. He was hypertensive to 179/109 and hypoxic to 90%/RA.   Remainder of ROS reviewed and negative except as indicated in HPI.     Objective     Past Medical History  He has a past medical history of Bronchitis and HTN (hypertension).    Surgical History  He has a past surgical history that includes Cochlear implant and Foot surgery.    Social History     Tobacco Use    Smoking status: Every Day     Current packs/day: 1.00     Types: Cigarettes     Passive exposure: Current    Smokeless tobacco: Former   Vaping Use    Vaping status: Never Used   Substance Use Topics    Alcohol use: Not Currently     Alcohol/week: 1.0 standard drink of alcohol     Types: 1 Cans of beer per week     Comment: socially    Drug use: Yes     Types: Marijuana       Family History  Family History   Problem Relation Name Age of Onset    Cancer Mother      COPD Father         Allergies  Patient has no known allergies.    Vitals:    03/16/25 1700   BP: (!) 171/102   Pulse: 89   Resp: 18   Temp:    SpO2: 98%       Vitals:    03/16/25 1407   Weight: 72.6 kg (160 lb)        Scheduled medications  [START ON 3/17/2025] aspirin, 81 mg, oral, Daily  atorvastatin, 40 mg, oral, Nightly  clopidogrel, 300 mg, oral, Once   And  [START ON 3/17/2025] clopidogrel, 75 mg, oral, Daily  perflutren lipid microspheres, 0.5-10 mL of dilution, intravenous, Once in imaging  sennosides-docusate sodium, 1 tablet, oral, Nightly      Continuous medications     PRN medications  PRN medications: acetaminophen, albuterol, labetaloL, oxygen    Results for orders placed or performed during the hospital encounter of 03/16/25 (from the past 24 hours)   POCT GLUCOSE   Result Value Ref Range    POCT Glucose 88 74 - 99 mg/dL   CBC   Result Value Ref Range    WBC 9.3 4.4 - 11.3 x10*3/uL    nRBC 0.0 0.0 - 0.0 /100 WBCs    RBC 4.96 4.50 - 5.90 x10*6/uL    Hemoglobin 16.7 13.5 - 17.5 g/dL    Hematocrit 47.5 41.0 - 52.0 %    MCV 96 80 - 100 fL    MCH 33.7 26.0 - 34.0 pg    MCHC 35.2 32.0 - 36.0 g/dL    RDW 12.6 11.5 - 14.5 %    Platelets 164 150 - 450 x10*3/uL   Comprehensive metabolic panel   Result Value Ref Range    Glucose 90 74 - 99 mg/dL    Sodium 137 136 - 145 mmol/L    Potassium 3.8 3.5 - 5.3 mmol/L    Chloride 99 98 - 107 mmol/L    Bicarbonate 25 21 - 32 mmol/L    Anion Gap 17 10 - 20 mmol/L    Urea Nitrogen 10 6 - 23 mg/dL    Creatinine 0.97 0.50 - 1.30 mg/dL    eGFR 85 >60 mL/min/1.73m*2    Calcium 9.9 8.6 - 10.3 mg/dL    Albumin 4.5 3.4 - 5.0 g/dL    Alkaline Phosphatase 101 33 - 136 U/L    Total Protein 7.2 6.4 - 8.2 g/dL    AST 18 9 - 39 U/L    Bilirubin, Total 1.2 0.0 - 1.2 mg/dL    ALT 16 10 - 52 U/L   Protime-INR   Result Value Ref Range    Protime 10.0 9.8 - 12.4 seconds    INR 0.9 0.9 - 1.1   Ethanol   Result Value Ref Range    Alcohol <10 <=10 mg/dL   Lipid Panel   Result Value Ref Range    Cholesterol 213 (H) 0 - 199 mg/dL    HDL-Cholesterol 110.7 mg/dL    Cholesterol/HDL Ratio 1.9     LDL Calculated 88 <=99 mg/dL    VLDL 14 0 - 40 mg/dL    Triglycerides 71 0 - 149 mg/dL    Non HDL Cholesterol 102  0 - 149 mg/dL   B-Type Natriuretic Peptide   Result Value Ref Range    BNP 28 0 - 99 pg/mL         I personally reviewed all pertinent labwork, imaging and vital signs, as well as medications, nursing, therapy, discharge planning and consult notes.     Constitutional: Well developed, awake, alert, calm, oriented x4, no acute distress, cooperative, disheveled   Eyes: EOMI, clear sclerae   ENMT: mucous membranes moist, no lesions seen   Head/Neck: Neck supple, no apparent injury, head atraumatic   Respiratory/Thorax: CTAB/diminished, good chest expansion, respirations even and unlabored   Cardiovascular: Regular rate and rhythm, no murmurs/rubs/gallops, normal S1 and S 2   Gastrointestinal: Abdomen nondistended, soft, nontender, +BS, no bruits   Musculoskeletal: ROM intact, no joint swelling, normal  strength   Extremities: no cyanosis, contusions or clubbing, or edema   Neurological: [place], dysarthria noted, L-sided facial droop, pt alert and oriented x4   Psychological: Appropriate affect and behavior   Skin: Warm and dry, no lesions, no rashes       Assessment/Plan     Strokelike symptoms  Pt presented with left-sided facial drooping with drooling, dysarthia and bilateral ataxia, onset two days PTA  CTH was nonacute  Start stroke protocol with neuro checks, order echo, start DAPT and statin  Will order MRI brain w/o, consult neurology  Permissive HTN to maintain SBP > 220/180 mm Hg  SLP rolly, PT/OT    HTN, uncontrolled  Pt no longer on metoprolol 50 mg daily, permissive HTN for now, resume when able    Hx bronchitis  Tobacco dependence  Pt has smoked 1 ppd since age 22, he declines nicotine patch   Continue home PRN albuterol, CXR was nonacute, mild hypoxia has resolved    Discharge disposition  Pending PT/OT palmira Chilel, CNP  [facility]

## 2025-03-16 NOTE — ED TRIAGE NOTES
Pt arrives to ED via private vehicle from home.    Code Status:  No Order    HPI     Chief Complaint   Patient presents with    Stroke       Isaac Condon is a 70-year-old male present emerged part with concern for strokelike symptoms.  Patient had symptoms Friday morning he was having difficulty coordinating his movements difficulty ambulating he left-sided facial droop and dysarthria.  Patient's fairly member stated that he had difficulty coordinating to eat and drink.  She stated that he did have a CVA in December he came by EMS was discharged home.  Has not followed up does not take any medication.  Patient is not followed regularly with a primary care physician and does not have any known medical history per family member.  Patient denies any trauma        History provided by:  Patient, medical records and relative         BP (!) 136/94   Pulse 81   Temp 36.2 °C (97.2 °F) (Temporal)   Resp 12   Wt 72.6 kg (160 lb)   SpO2 97%     [place] Coma Scale Score: 15      LDA:   Peripheral IV 03/16/25 20 G Left Antecubital (Active)   Placement Date/Time: 03/16/25 1509   Hand Hygiene Completed: Yes  Size (Gauge): 20 G  Orientation: Left  Location: Antecubital  Site Prep: Alcohol;Chlorhexidine   Local Anesthetic: None  Insertion attempts: 2  Patient Tolerance: Age appropriate   Number of days: 0        BACKGROUND  Past Medical History:   Diagnosis Date    Bronchitis      Past Surgical History:   Procedure Laterality Date    COCHLEAR IMPLANT      FOOT SURGERY      toe fracture repair     No current facility-administered medications on file prior to encounter.     Current Outpatient Medications on File Prior to Encounter   Medication Sig Dispense Refill    albuterol 90 mcg/actuation inhaler Inhale 2 puffs every 6 hours if needed for wheezing. 18 g 5    cholecalciferol (Vitamin D-3) 50 MCG (2000 UT) tablet Take 1 tablet (2,000 Units) by mouth once daily.      fluticasone propion-salmeteroL (Advair Diskus) 250-50 mcg/dose  diskus inhaler Inhale 1 puff 2 times a day. 60 each 5        ASSESSMENT  ED Course as of 03/16/25 1707   Sun Mar 16, 2025   1459 EKG was reviewed interpreted by myself, EKG was significant for sinus rhythm with a rate of 79, normal axis, QTc 428 no previous EKG [SS]   1704 WBC: 9.3  Patient is not a leukocytosis, normal sodium potassium level [SS]      ED Course User Index  [SS] Tamika Sung MD         Diagnoses as of 03/16/25 1707   Cerebrovascular accident (CVA), unspecified mechanism (Multi)       Medications Currently Running:        Medications Given:  ED Medication Administration from 03/16/2025 1404 to 03/16/2025 1707         Date/Time Order Dose Route Action Action by     03/16/2025 1647 EDT aspirin chewable tablet 324 mg 324 mg oral Given Cortés, L     03/16/2025 1654 EDT clopidogrel (Plavix) tablet 300 mg 300 mg oral Not Given Cortés, L                 RESULTS    Imaging:  XR chest 1 view   Final Result   1.  No evidence of acute cardiopulmonary process.                  MACRO:   None        Signed by: Jose Johnson 3/16/2025 2:58 PM   Dictation workstation:   [id number]      CT head wo IV contrast   Final Result   NO ACUTE INTRACRANIAL PROCESS        MACRO:   None        Signed by: Cresencio Wilder 3/16/2025 2:30 PM   Dictation workstation:   [id number]      Transthoracic Echo (TTE) Complete    (Results Pending)   MR brain w and wo IV contrast    (Results Pending)      }  Labs ::99  Abnormal Labs Reviewed   LIPID PANEL - Abnormal; Notable for the following components:       Result Value    Cholesterol 213 (*)     All other components within normal limits

## 2025-03-16 NOTE — CARE PLAN
Problem: General Stroke  Goal: Establish a mutual long term goal with patient by discharge  Reactivated  Goal: Demonstrate improvement in neurological exam throughout the shift  Reactivated  Goal: Maintain BP within ordered limits throughout shift  Reactivated  Goal: Participate in treatment (ie., meds, therapy) throughout shift  Reactivated  Goal: No symptoms of aspiration throughout shift  Reactivated  Goal: No symptoms of hemorrhage throughout shift  Reactivated  Goal: Tolerate enteral feeding throughout shift  Reactivated  Goal: Decreased nausea/vomiting throughout shift  Reactivated  Goal: Controlled blood glucose throughout shift  Reactivated  Goal: Out of bed three times today  Reactivated     Problem: Pain - Adult  Goal: Verbalizes/displays adequate comfort level or baseline comfort level  Reactivated     Problem: Safety - Adult  Goal: Free from fall injury  Reactivated     Problem: Discharge Planning  Goal: Discharge to home or other facility with appropriate resources  Reactivated     Problem: Chronic Conditions and Co-morbidities  Goal: Patient's chronic conditions and co-morbidity symptoms are monitored and maintained or improved  Reactivated     Problem: Nutrition  Goal: Nutrient intake appropriate for maintaining nutritional needs  Reactivated       The clinical goals for the shift include patient will have no falls or injuries throughout this shift.

## 2025-03-17 ENCOUNTER — APPOINTMENT (OUTPATIENT)
Dept: CARDIOLOGY | Facility: HOSPITAL | Age: 70
End: 2025-03-17
Payer: MEDICARE

## 2025-03-17 ENCOUNTER — APPOINTMENT (OUTPATIENT)
Dept: RADIOLOGY | Facility: HOSPITAL | Age: 70
End: 2025-03-17
Payer: MEDICARE

## 2025-03-17 LAB
ANION GAP SERPL CALC-SCNC: 16 MMOL/L (ref 10–20)
AORTIC VALVE MEAN GRADIENT: 2 MMHG
AORTIC VALVE PEAK VELOCITY: 1 M/S
APPEARANCE UR: CLEAR
ATRIAL RATE: 79 BPM
AV PEAK GRADIENT: 4 MMHG
AVA (PEAK VEL): 2.65 CM2
AVA (VTI): 2.69 CM2
BILIRUB UR STRIP.AUTO-MCNC: NEGATIVE MG/DL
BUN SERPL-MCNC: 14 MG/DL (ref 6–23)
CALCIUM SERPL-MCNC: 9.6 MG/DL (ref 8.6–10.3)
CHLORIDE SERPL-SCNC: 102 MMOL/L (ref 98–107)
CO2 SERPL-SCNC: 23 MMOL/L (ref 21–32)
COLOR UR: YELLOW
CREAT SERPL-MCNC: 0.85 MG/DL (ref 0.5–1.3)
EGFRCR SERPLBLD CKD-EPI 2021: >90 ML/MIN/1.73M*2
EJECTION FRACTION APICAL 4 CHAMBER: 67.4
EJECTION FRACTION: 64 %
ERYTHROCYTE [DISTWIDTH] IN BLOOD BY AUTOMATED COUNT: 12.6 % (ref 11.5–14.5)
GLUCOSE BLD MANUAL STRIP-MCNC: 75 MG/DL (ref 74–99)
GLUCOSE BLD MANUAL STRIP-MCNC: 87 MG/DL (ref 74–99)
GLUCOSE SERPL-MCNC: 88 MG/DL (ref 74–99)
GLUCOSE UR STRIP.AUTO-MCNC: NORMAL MG/DL
HCT VFR BLD AUTO: 48.4 % (ref 41–52)
HGB BLD-MCNC: 16.7 G/DL (ref 13.5–17.5)
HOLD SPECIMEN: NORMAL
KETONES UR STRIP.AUTO-MCNC: ABNORMAL MG/DL
LEFT ATRIUM VOLUME AREA LENGTH INDEX BSA: 10.9 ML/M2
LEFT VENTRICLE INTERNAL DIMENSION DIASTOLE: 3.91 CM (ref 3.5–6)
LEFT VENTRICULAR OUTFLOW TRACT DIAMETER: 2.02 CM
LEUKOCYTE ESTERASE UR QL STRIP.AUTO: NEGATIVE
LV EJECTION FRACTION BIPLANE: 64 %
MCH RBC QN AUTO: 33.4 PG (ref 26–34)
MCHC RBC AUTO-ENTMCNC: 34.5 G/DL (ref 32–36)
MCV RBC AUTO: 97 FL (ref 80–100)
MITRAL VALVE E/A RATIO: 0.63
MUCOUS THREADS #/AREA URNS AUTO: NORMAL /LPF
NITRITE UR QL STRIP.AUTO: NEGATIVE
NRBC BLD-RTO: 0 /100 WBCS (ref 0–0)
P AXIS: 45 DEGREES
PH UR STRIP.AUTO: 6 [PH]
PLATELET # BLD AUTO: 167 X10*3/UL (ref 150–450)
POTASSIUM SERPL-SCNC: 3.5 MMOL/L (ref 3.5–5.3)
PR INTERVAL: 129 MS
PROT UR STRIP.AUTO-MCNC: ABNORMAL MG/DL
Q ONSET: 251 MS
QRS COUNT: 13 BEATS
QRS DURATION: 78 MS
QT INTERVAL: 373 MS
QTC CALCULATION(BAZETT): 428 MS
QTC FREDERICIA: 409 MS
R AXIS: 22 DEGREES
RBC # BLD AUTO: 5 X10*6/UL (ref 4.5–5.9)
RBC # UR STRIP.AUTO: NEGATIVE MG/DL
RBC #/AREA URNS AUTO: NORMAL /HPF
RIGHT VENTRICLE FREE WALL PEAK S': 13.56 CM/S
SODIUM SERPL-SCNC: 137 MMOL/L (ref 136–145)
SP GR UR STRIP.AUTO: 1.02
T AXIS: 70 DEGREES
T OFFSET: 437 MS
TRICUSPID ANNULAR PLANE SYSTOLIC EXCURSION: 2.7 CM
UROBILINOGEN UR STRIP.AUTO-MCNC: NORMAL MG/DL
VENTRICULAR RATE: 79 BPM
WBC # BLD AUTO: 10.3 X10*3/UL (ref 4.4–11.3)
WBC #/AREA URNS AUTO: NORMAL /HPF

## 2025-03-17 PROCEDURE — 92526 ORAL FUNCTION THERAPY: CPT | Mod: GN | Performed by: SPEECH-LANGUAGE PATHOLOGIST

## 2025-03-17 PROCEDURE — 70496 CT ANGIOGRAPHY HEAD: CPT | Performed by: RADIOLOGY

## 2025-03-17 PROCEDURE — 2500000004 HC RX 250 GENERAL PHARMACY W/ HCPCS (ALT 636 FOR OP/ED): Performed by: INTERNAL MEDICINE

## 2025-03-17 PROCEDURE — 70498 CT ANGIOGRAPHY NECK: CPT | Performed by: RADIOLOGY

## 2025-03-17 PROCEDURE — C8929 TTE W OR WO FOL WCON,DOPPLER: HCPCS

## 2025-03-17 PROCEDURE — 97166 OT EVAL MOD COMPLEX 45 MIN: CPT | Mod: GO

## 2025-03-17 PROCEDURE — 2550000001 HC RX 255 CONTRASTS: Performed by: PSYCHIATRY & NEUROLOGY

## 2025-03-17 PROCEDURE — 93306 TTE W/DOPPLER COMPLETE: CPT | Performed by: INTERNAL MEDICINE

## 2025-03-17 PROCEDURE — 2500000004 HC RX 250 GENERAL PHARMACY W/ HCPCS (ALT 636 FOR OP/ED): Performed by: NURSE PRACTITIONER

## 2025-03-17 PROCEDURE — 36415 COLL VENOUS BLD VENIPUNCTURE: CPT | Performed by: NURSE PRACTITIONER

## 2025-03-17 PROCEDURE — 1200000002 HC GENERAL ROOM WITH TELEMETRY DAILY

## 2025-03-17 PROCEDURE — 99232 SBSQ HOSP IP/OBS MODERATE 35: CPT | Performed by: NURSE PRACTITIONER

## 2025-03-17 PROCEDURE — 92610 EVALUATE SWALLOWING FUNCTION: CPT | Mod: GN | Performed by: SPEECH-LANGUAGE PATHOLOGIST

## 2025-03-17 PROCEDURE — 70498 CT ANGIOGRAPHY NECK: CPT

## 2025-03-17 PROCEDURE — 2500000001 HC RX 250 WO HCPCS SELF ADMINISTERED DRUGS (ALT 637 FOR MEDICARE OP): Performed by: NURSE PRACTITIONER

## 2025-03-17 PROCEDURE — 81001 URINALYSIS AUTO W/SCOPE: CPT | Performed by: STUDENT IN AN ORGANIZED HEALTH CARE EDUCATION/TRAINING PROGRAM

## 2025-03-17 PROCEDURE — 99223 1ST HOSP IP/OBS HIGH 75: CPT | Performed by: PSYCHIATRY & NEUROLOGY

## 2025-03-17 PROCEDURE — 80048 BASIC METABOLIC PNL TOTAL CA: CPT | Performed by: NURSE PRACTITIONER

## 2025-03-17 PROCEDURE — 82947 ASSAY GLUCOSE BLOOD QUANT: CPT

## 2025-03-17 PROCEDURE — 97162 PT EVAL MOD COMPLEX 30 MIN: CPT | Mod: GP

## 2025-03-17 PROCEDURE — 85027 COMPLETE CBC AUTOMATED: CPT | Performed by: NURSE PRACTITIONER

## 2025-03-17 RX ORDER — ASPIRIN 81 MG/1
81 TABLET ORAL DAILY
Qty: 21 TABLET | Refills: 0 | Status: CANCELLED | OUTPATIENT
Start: 2025-03-18

## 2025-03-17 RX ORDER — GUAIFENESIN 600 MG/1
600 TABLET, EXTENDED RELEASE ORAL 2 TIMES DAILY PRN
Status: DISCONTINUED | OUTPATIENT
Start: 2025-03-17 | End: 2025-03-19 | Stop reason: HOSPADM

## 2025-03-17 RX ORDER — ATORVASTATIN CALCIUM 40 MG/1
40 TABLET, FILM COATED ORAL NIGHTLY
Qty: 60 TABLET | Refills: 1 | Status: CANCELLED | OUTPATIENT
Start: 2025-03-17

## 2025-03-17 RX ORDER — FLUTICASONE FUROATE AND VILANTEROL 200; 25 UG/1; UG/1
1 POWDER RESPIRATORY (INHALATION)
Status: DISCONTINUED | OUTPATIENT
Start: 2025-03-17 | End: 2025-03-19 | Stop reason: HOSPADM

## 2025-03-17 RX ADMIN — ASPIRIN 81 MG: 81 TABLET, COATED ORAL at 07:49

## 2025-03-17 RX ADMIN — IOHEXOL 50 ML: 350 INJECTION, SOLUTION INTRAVENOUS at 15:28

## 2025-03-17 RX ADMIN — HUMAN ALBUMIN MICROSPHERES AND PERFLUTREN 0.5 ML: 10; .22 INJECTION, SOLUTION INTRAVENOUS at 12:53

## 2025-03-17 RX ADMIN — SENNOSIDES AND DOCUSATE SODIUM 1 TABLET: 8.6; 5 TABLET ORAL at 20:55

## 2025-03-17 RX ADMIN — GUAIFENESIN 600 MG: 600 TABLET ORAL at 17:27

## 2025-03-17 RX ADMIN — FLUTICASONE FUROATE AND VILANTEROL TRIFENATATE 1 PUFF: 200; 25 POWDER RESPIRATORY (INHALATION) at 17:01

## 2025-03-17 RX ADMIN — CLOPIDOGREL 75 MG: 75 TABLET ORAL at 07:49

## 2025-03-17 RX ADMIN — ATORVASTATIN CALCIUM 40 MG: 40 TABLET, FILM COATED ORAL at 20:55

## 2025-03-17 ASSESSMENT — COGNITIVE AND FUNCTIONAL STATUS - GENERAL
MOVING TO AND FROM BED TO CHAIR: A LITTLE
DAILY ACTIVITIY SCORE: 14
MOBILITY SCORE: 18
MOBILITY SCORE: 21
STANDING UP FROM CHAIR USING ARMS: A LITTLE
TOILETING: A LITTLE
HELP NEEDED FOR BATHING: A LITTLE
CLIMB 3 TO 5 STEPS WITH RAILING: A LOT
EATING MEALS: A LITTLE
DRESSING REGULAR LOWER BODY CLOTHING: A LITTLE
DAILY ACTIVITIY SCORE: 19
TOILETING: A LITTLE
EATING MEALS: A LITTLE
EATING MEALS: A LITTLE
DRESSING REGULAR UPPER BODY CLOTHING: A LOT
TOILETING: A LOT
DAILY ACTIVITIY SCORE: 19
HELP NEEDED FOR BATHING: A LITTLE
PERSONAL GROOMING: A LITTLE
WALKING IN HOSPITAL ROOM: A LITTLE
WALKING IN HOSPITAL ROOM: A LITTLE
PERSONAL GROOMING: A LITTLE
DRESSING REGULAR LOWER BODY CLOTHING: A LITTLE
CLIMB 3 TO 5 STEPS WITH RAILING: A LITTLE
TURNING FROM BACK TO SIDE WHILE IN FLAT BAD: A LITTLE
PERSONAL GROOMING: A LITTLE
DRESSING REGULAR LOWER BODY CLOTHING: A LOT
WALKING IN HOSPITAL ROOM: A LITTLE
HELP NEEDED FOR BATHING: A LOT
CLIMB 3 TO 5 STEPS WITH RAILING: A LITTLE
MOBILITY SCORE: 21

## 2025-03-17 ASSESSMENT — PAIN SCALES - GENERAL
PAINLEVEL_OUTOF10: 0 - NO PAIN

## 2025-03-17 ASSESSMENT — ACTIVITIES OF DAILY LIVING (ADL)
BATHING_ASSISTANCE: MODERATE
ADL_ASSISTANCE: INDEPENDENT
ADL_ASSISTANCE: INDEPENDENT
LACK_OF_TRANSPORTATION: NO

## 2025-03-17 ASSESSMENT — PAIN - FUNCTIONAL ASSESSMENT
PAIN_FUNCTIONAL_ASSESSMENT: 0-10
PAIN_FUNCTIONAL_ASSESSMENT: 0-10

## 2025-03-17 NOTE — PROGRESS NOTES
Speech-Language Pathology Clinical Swallow Evaluation    Patient Name: Isaac Condon  MRN: [id number]  : 1955  [facility]  Today's Date: 25  Start time: Start Time: 1730  Stop time: Stop Time: 1820  Time calculation (min) : Time Calculation (min): 50 min    ASSESSMENT  Impressions:  Pt demonstrating moderate dysarthria and moderate oral phase dysphagia.  Pt is edentulous and with the decreased labial and  lingual ROM and strength and reduced jaw strength pt is unable to properly and safely masticate his food.      Pt showed improved intelligibility with over articulation strategies.     Diet modified to Soft bite-sized diet and THIN Liquids so pt can safely and efficiently and effectively masticate food.    No overt coughing or choking noted. However, will monitor for signs of silent aspiration (changes in lung sounds, fevers etc).     Pt would benefit from skilled ST to minimize aspiration risk and ensure ongoing safety with the least restrictive diet as well as improve speech intelligibility through over articulation strategies and oral motor exercises against resistance.     Additional consult/referral: N/A     Prognosis: Good      PLAN  Recommendations:  MBSS recommended: SLP will continue to monitor to determine if MBSS is clinically warranted.  Solid consistency: Soft/bite-sized (IDDSI level 6)  Liquid consistency: Thin (IDDSI 0)  Medication administration: Whole in thin liquid, Per pt preference, Per nursing discretion - may need to change how meds are given if pt having difficulty taking meds with liquids.     Compensatory swallow strategies: - Upright positioning for all PO intake  - Remain upright for >30 min after meals  - Slow rate of intake  - Small bites  - One bite/sip at a time  - Alternate bites and sips  - Effortful swallow  - Chew thoroughly  - Supervision recommended during meals  - Do not feed unless pt is fully alert and upright  - Lingual sweep to clear oral  residue  - Check for pocketing during/after meals  - Sips via straw are ok    Recommended frequency/duration:  Skilled SLP services recommended: Yes  Frequency: 2x/week  Duration: 2 weeks  Discharge recommendation: Recommend HIGH intensity ST upon DC in order to ensure safety with least restrictive diet.  Strengths: Cognition, Motivation, Family/caregiver support, and Anticipated fair or good medical prognosis  Barriers to participation in tx: Hearing impairment      Goals (start date 03/17/2025 for two weeks END 03/31/2025):  - Pt will consume prescribed diet (current diet is Soft bite-sized diet and THIN Liquids) without overt s/sx aspiration/penetration in 95% of observed trials.   Status: Goal initiated this date   Progress this date: TBD     - Pt will demonstrate follow-through of trained compensatory strategies during a meal/snack with 90% acc independently.   Status: Goal initiated this date   Progress this date: TBD     - Pt will complete oral/pharyngeal/laryngeal strengthening exercises as directed given min verbal and visual cues from SLP in order to improve swallow function.   Status: Goal initiated this date - progressing   Progress this date: Pt completed over articulation exercises with improved intelligibility. Pt with improved lingual ROM with verbal and visual cues.      - Pt/family will demonstrate understanding of education related to dysphagia independently.   Status: Goal initiated this date   Progress this date: TBD        REASON FOR ADMISSION:  CHIEF COMPLAINT: slurred speech, left facial droop with drooling. Left sided weakness and bilateral ataxia, onset two days PTA.    PMHx relevant to rehab:  bronchitis and HTN     Relevant imaging results: Per neurology - Head CT without contrast done did not show any acute findings, however there was some streak artifact on the right side.  Of note, not reported but patient appears to have interval development of hypodensity in the left corona radiata  area not clearly present in December 2024 head CT without contrast.       SUBJECTIVE  SLP Received On: 03/17/25  Patient Class: Inpatient  Living Environment: Live with __ (spouse)  Ordering Physician: Getachew ROSENTHAL  Reason for Referral: stroke - dysarthria and suspect oropharygneal dysphagia  Prior to Session Communication: Bedside nurse    RN cleared pt to participate in session and reported that Pt took his pills with water ok.     Pt/family reported - Pt reported difficulty swallowing.  Only has taken sips of water and ice cream.  He is edentulous and doesn't wear his dentures.  He also has a cochlear implant, but doesn't wear the out piece.  He is hard of hearing.  If closer to him he can hear better.     Nutritional status: Recommend dietician consult          [employer] Diet: Regular diet and Thin Liquids  Current Diet : Regular diet and thin liquids    Pain Assessment  Pain Assessment: 0-10  0-10 (Numeric) Pain Score: 0 - No pain    Behavior/Cognition: Alert, Cooperative, Pleasant mood  Orientation: Oriented to self, Oriented to location, and Oriented to situation  Ability to follow functional commands: Follows simple commands  Respiratory Status: Room air     Volitional Cough: Weak  Volitional Swallow: Delayed  Patient positioning: Upright in bed      OBJECTIVE  Clinical swallow evaluation completed and consisted of interview, oral motor assessment, and PO trials (3 oz of applesauce, 1 oz of applesauce with pieces of silvio crackers - to represent soft bite-sized texture x4. Single sips of water and two sips in a row of water via straw).    ORAL MOTOR: Dentition: Edentulous.  Oral Hygiene: Oral mucosa were pink, moist, and free of obvious lesions. Lingual strength and ROM were diminished on left. Labial strength/ROM were diminished on left.  Labial seal was adequate for spoon, cup and straw. Palatal elevation: not tested.      ORAL PHASE: Mastication of soft solids was prolonged.  A/P transit was slow.   Piecemeal deglutition noted with min oral residuals seen after the final swallow that did clear with either a dry swallow or a liquid wash.    PHARYNGEAL PHASE: Laryngeal elevation was visualized or palpated with all trials, however adequacy of hyolaryngeal elevation/excursion cannot be determined at bedside. No immediate or delayed s/sx aspiration/penetration were observed with any consistencies.    Modify diet to Soft bite-sized diet and THIN Liquids and continue to assess safety.    Pts speech is moderately dysarthric.  When pt given task of over exaggeration or over articulation pts intelligibility improved.  Pt instructed to state the days of the week and months of the year every hour with over articulation strategies.  Pt instructed to complete lingual ROM exercises 3x/day 10x each (lingual protrusion center, left and right - hold for three seconds and repeat).  Wife present and in agreement with plan.      ESOPHAGEAL PHASE: N/A at time of eval.       Treatment/Education:  Results and recommendations were relayed to: Patient, Family, Bedside nurse, Physician, and NP  Education provided: Yes   Learner: Patient and Significant other   Barriers to learning: None and Acuteness of illness barrier   Method of teaching: Verbal and Demonstration   Topic: role of ST, results of assessment, risk for aspiration, recommended diet modifications, recommended safe swallow strategies, and recommendation for dysphagia follow-up, oral motor exercises, over articulation strategies and exercises.    Outcome of teaching: Pt/family demonstrated good understanding and teachback/return demonstration  Treatment provided: Yes    Next Treatment Priority: reassess swallow function in order to determine if safe to continue modified diet of soft bite-sized foods and thin liquids. Dysarthria exercises for lips and tongue.

## 2025-03-17 NOTE — PROGRESS NOTES
Social work consult placed for discharge planning. SW reviewed pt's chart and communicated with TCC. No SW needs foreseen at this time. SW signing off; available upon request.    TESFAYE Mayorga ([id number])   Care Transitions

## 2025-03-17 NOTE — PROGRESS NOTES
Isaac Condon is a 70 y.o. male admitted for Cerebrovascular accident (CVA), unspecified mechanism (Multi). Pharmacy reviewed the patient's jzriw-sd-ugearadps medications and allergies for accuracy.    The list below reflects the PTA list prior to pharmacy medication history. A summary a changes to the PTA medication list has been listed below. Please review each medication in order reconciliation for additional clarification and justification.    Source of information:  Agatha and dr visit  No Changes!  Medications added:    Medications modified:    Medications to be removed:    Medications of concern:      Prior to Admission Medications   Prescriptions Last Dose Informant Patient Reported? Taking?   albuterol 90 mcg/actuation inhaler   No No   Sig: Inhale 2 puffs every 6 hours if needed for wheezing.   cholecalciferol (Vitamin D-3) 50 MCG (2000 UT) tablet   Yes No   Sig: Take 1 tablet (2,000 Units) by mouth once daily.   fluticasone propion-salmeteroL (Advair Diskus) 250-50 mcg/dose diskus inhaler   No No   Sig: Inhale 1 puff 2 times a day.      Facility-Administered Medications: None       Zena Vogel

## 2025-03-17 NOTE — PROGRESS NOTES
03/17/25 1607   Discharge Planning   Living Arrangements Spouse/significant other   Support Systems Family members   Assistance Needed minimal   Type of Residence Private residence   Home or Post Acute Services Post acute facilities (Rehab/SNF/etc)   Type of Post Acute Facility Services Rehab   Expected Discharge Disposition Rehab   Does the patient need discharge transport arranged? Yes   RoundTrip coordination needed? Yes   Housing Stability   At any time in the past 12 months, were you homeless or living in a shelter (including now)? N   Transportation Needs   In the past 12 months, has lack of transportation kept you from meetings, work, or from getting things needed for daily living? No   Stroke Family Assessment   Stroke Family Assessment Needed No   Intensity of Service   Intensity of Service 0-30 min     I spoke with patient to introduce discharge planning and explain role of TCC. Pt is here for CVA.  He states he lives with his wife at home; denies use of any DME; had fall back in December; drives.  His wife does the shopping, cooking, housekeeping.  He states his PCP is .  PT rec for high intensity therapy and this was discussed with pt.  He is agreeable.  He did permit me reaching out to his wife about this as well. I called her to let her know that I will meet with her tomorrow when she gets here and she just has tell RN to let me know when she is here.  She is agreeable to this plan.

## 2025-03-17 NOTE — CARE PLAN
The patient's goals for the shift include      The clinical goals for the shift include Patient will remain safe    Over the shift, the patient did not make progress toward the following goals. Barriers to progression include . Recommendations to address these barriers include .

## 2025-03-17 NOTE — PROGRESS NOTES
Occupational Therapy  Evaluation    Patient Name: Isaac Condon  MRN: [id number]  Today's Date: 3/17/2025  Time Calculation  Start Time: 1104  Stop Time: 1124  Time Calculation (min): 20 min    Current Problem:   1. Cerebrovascular accident (CVA), unspecified mechanism (Multi)    2. Ataxic aphasia        OT order: OT eval and treat   Referred by: Getachew  Reason for referral: stroke (pending completion of testing. pt presents with L facial droop and L sided weakness an ataxia)  Past medical history related to rehab:   Past Medical History:   Diagnosis Date    Bronchitis     HTN (hypertension)         Precautions:   Hearing/Visual Limitations: intact  Medical Precautions: Fall precautions    ASSESSMENT  OT Assessment: OT eval completed. The patient is functioning below baseline for ADLs and mobility. can benefit from continued OT. Pt with Decreased ADL status, Decreased upper extremity strength, Decreased safe judgment during ADL, Decreased endurance, Decreased cognition, Decreased sensation, Decreased gross motor control, Decreased functional mobility, Decreased IADLs  Prognosis:    Barriers to discharge home: Physical needs           Tolerance:      PLAN  Frequency: 5 times per week  Treatment Interventions: ADL retraining, UE strengthening/ROM, Functional transfer training, Endurance training, Patient/family training, Cognitive reorientation, Equipment evaluation/education, Neuromuscular reeducation, Fine motor coordination activities, Compensatory technique education  Discharge Recommendations: High intensity level of continued care  OT OK to discharge: Yes    GENERAL VISIT INFORMATION   Start of session communication: Bedside nurse  End of session communication: Bedside nurse  Family/caregiver present: No  Caregiver feedback:    Co-Treatment:    Reason for co-treatment:     Position Pt Received:  Bed, 3 rail up, Alarm off, not on at start of session  End of session position: Up in chair, Alarm  on    SUBJECTIVE  Home Living:  Type of Home: House  Lives With: Spouse  Home Adaptive Equipment: None  Home Layout: Two level (bed upstairs, bath downstairs on main floor.)  Home Access: Stairs to enter with rails  Entrance Stairs-Number of Steps: 1  Bathroom Shower/Tub: Tub/shower unit (no DME)     Prior Level of Function:  ADL Assistance: Independent  Homemaking Assistance: Needs assistance (wife assists)  Ambulatory Assistance: Independent  Vocational: Retired  Leisure: gardening  Hand Dominance: Ambidextrous (handwriting left hand)  Prior Function Comments: +drives    IADL History:       Pain:  Assessment: 0-10  Score: 0 - No pain  Type:    Location:    Interventions:    Response to pain interventions:      OBJECTIVE  Vital Signs:       Cognition:  Overall Cognitive Status: Within Functional Limits (except speech is dysarthric. no aphasia evident.)  Orientation Level: Oriented X4  Attention: Within Functional Limits  Memory: Within Funtional Limits  Processing Speed: Delayed             Current ADL function:   EATING:  Stand by     GROOMING: Minimal     BATHING: Moderate     UB DRESSING: Minimal     LB DRESSING: Moderate     TOILETING: Moderate    ADL comments:       Activity Tolerance:  Endurance: Decreased tolerance for upright activites    Bed Mobility/Transfers:   Bed Mobility  Bed Mobility: Yes  Bed Mobility 1  Bed Mobility 1: Supine to sitting  Level of Assistance 1: Close supervision  Transfers  Transfer:  (sit<>stand CGA  x1)    Ambulation/Gait Training:  Functional Mobility  Functional Mobility Performed:  (pt performed functional mobility bedside to chair with min A x1 hand held assistance)    Sitting Balance:  Static Sitting Balance  Static Sitting-Level of Assistance: Close supervision  Dynamic Sitting Balance  Dynamic Sitting-Level of Assistance: Close supervision    Standing Balance:  Static Standing Balance  Static Standing-Level of Assistance: Minimum assistance  Dynamic Standing  Balance  Dynamic Standing-Level of Assistance: Contact guard, Minimum assistance    Vision: Vision - Basic Assessment  Current Vision: No visual deficits   and Vision - Complex Assessment  Ocular Range of Motion: Restricted on the left  Head Position: Upright, centered, looking straight ahead, not leaning any direction (facial muscle weakness. left eye droop)  Tracking:  (decreased to left)    Sensation:  Light Touch: No apparent deficits (pt denies)    Strength:  Strength Comments: RUE 4+/5, LUE 4-/5    Perception:  Inattention/Neglect: Appears intact  Initiation: Appears intact  Perseveration: Not present    Coordination:  Movements are Fluid and Coordinated: No  Upper Body Coordination: movements bradykinetic. L dis coordination  Finger to Nose: Dyskinesia, Bradykinesia  Rapid Alternating Movements: Bradykinesia  Heel to Shin: Intact     Hand Function:  Hand Function  Gross Grasp: Functional    Extremities: RUE   RUE : Within Functional Limits and LUE   LUE: Within Functional Limits (except drift and lags at end ranges of shoulder)    Outcome Measures: [facility] Daily Activity   Putting on and taking off regular lower body clothing: A lot  Bathing (including washing, rinsing, drying): A lot  Putting on and taking off regular upper body clothing: A lot  Toileting, which includes using toilet, bedpan or urinal: A lot  Taking care of personal grooming such as brushing teeth: A little   Eating Meals: A little   Daily Activity - Total Score: 14    EDUCATION:     Education Documentation  ADL Training, taught by Pily Rodriguez OT at 3/17/2025  3:06 PM.  Learner: Patient  Readiness: Acceptance  Method: Explanation  Response: Needs Reinforcement    Education Comments  No comments found.        Goals:   Encounter Problems       Encounter Problems (Active)       ADLs       Patient with complete lower body dressing with modified independent level of assistance donning and doffing all LE clothes  with PRN adaptive equipment  while supported sitting and standing (Progressing)       Start:  03/17/25    Expected End:  03/31/25               EXERCISE/STRENGTHENING       Patient with increase LUE strength and coordination. (Progressing)       Start:  03/17/25    Expected End:  03/31/25               MOBILITY       Patient will perform Functional mobility max Household distances/Community Distances with modified independent level of assistance and least restrictive device in order to improve safety and functional mobility. (Progressing)       Start:  03/17/25    Expected End:  03/31/25               TRANSFERS       Patient will complete functional transfers with least restrictive device with modified independent level of assistance. (Progressing)       Start:  03/17/25    Expected End:  03/31/25

## 2025-03-17 NOTE — CARE PLAN
Problem: Mobility  Goal: STG - Patient will ambulate  Description: 150 FT PACING ACTIVITY FOR BEST LLE CONTROL, SBA, FWW/CANE FOR SAFETY PRN  Outcome: Not Progressing  Goal: STG - Patient will ambulate up and down a curb/step  Description: FWW/CANE PRN FOR SAFETY, CGA  Outcome: Not Progressing  Goal: Goal 1  Description: 20 REPS RROM INCREASING STRENGTH/COORDINATION &  ASSISTED BALANCE ACTIVITIES TO IMPROVE GAIT STABILITY  Outcome: Not Progressing     Problem: PT Transfers  Goal: STG - Patient will transfer sit to and from stand  Description: SBA USING PROPER/SAFE TECHNIQUE    Outcome: Not Progressing

## 2025-03-17 NOTE — PROGRESS NOTES
Physical Therapy    Physical Therapy Evaluation    Patient Name: Isaac Condon  MRN: [id number]  Department: [facility]  Room: [address] [place], [place] [address]  Today's Date: 3/17/2025   Time Calculation  Start Time: 1402  Stop Time: 1424  Time Calculation (min): 22 min    Assessment/Plan   PT Assessment  PT Assessment Results: Decreased strength, Decreased endurance, Impaired balance, Decreased mobility, Decreased safety awareness, Impaired judgement, Impaired hearing  Rehab Prognosis: Fair  Barriers to Discharge Home: Caregiver assistance, Cognition needs, Physical needs  Caregiver Assistance: Caregiver assistance needed per identified barriers - however, level of patient's required assistance exceeds assistance available at home  Cognition Needs: 24hr supervision for safety awareness needed, Medication and/or medical management daily assist needed, Recollection or understanding of precautions/restrictions limited, Insight of patient limited regarding functional ability/needs, Cognition-related high falls risk  Physical Needs: Stair navigation to access bed limited by function/safety, Stair navigation to access bath limited by function/safety, In-home setup navigation limited by function/safety, Ambulating household distances limited by function/safety, 24hr mobility assistance needed, Intermittent mobility assistance needed, Intermittent ADL assistance needed, High falls risk due to function or environment  Evaluation/Treatment Tolerance: Patient limited by fatigue  Barriers to Participation: Insight into problems  End of Session Communication: Bedside nurse (MOBILITY STATUS)  Assessment Comment: PT. W/ LE WEAKNESS L>R, , LLE QUICKLY FATIGUED W/ ACTIVITY &  THREATENS TO BUCKLE W/ ACATIVITY, DECREASED PROCESSING OF COMMANDS, IMPULSIVE  FALLRISK, RECOMMEND HIGH REHAB  End of Session Patient Position: Bed, 3 rail up, Alarm on (CALL LIGHT IN REACH)  IP OR SWING BED PT PLAN  Inpatient or Swing Bed: Inpatient  PT Plan  Treatment/Interventions:  Transfer training, Gait training, Stair training, Balance training, Strengthening, Endurance training  PT Plan: Ongoing PT  PT Frequency: 5 times per week  PT Discharge Recommendations: High intensity level of continued care  Equipment Recommended upon Discharge: Wheeled walker, Straight cane (TBD)  PT Recommended Transfer Status: Assist x1  PT - OK to Discharge: Yes (WHEN MEDICALLY CLEARED)    Subjective   General Visit Information:  General  Reason for Referral: IMPAIRED MOBILITY  Referred By: CODY  Past Medical History Relevant to Rehab: L FACIAL DROOP/DROOLING  FOR 2 DAYS,  DYSARTHRIA, ATAXIA; DX: CVA; HX: PER FAMLIY CVA 12/2024, CODHLEAR I MPLANT, FOOT SURG, TOBACCO,THC USE, SYNCOPE, BRONCHITIS, ETOH USE, FALLS  Family/Caregiver Present: Yes  Caregiver Feedback: SPOUSE ASSISTS W/ PLOF INFO  Prior to Session Communication: Bedside nurse (OK FOR THERAPY)  Patient Position Received: Bed, 3 rail up, Alarm off, not on at start of session (ROOM 2333, ALERT IV; AGREES TO THERAPY)  General Comment: PER WIFEHEARS BETTER IN L EAR; PT. W/ FLAT AFFECT, L FACIAL DROOP  Home Living:  Home Living  Type of Home: House  Lives With: Spouse  Home Adaptive Equipment: None  Home Layout: Two level (BEDUPSTAIRS, BATH DOWN STAIRS)  Home Access: Stairs to enter with rails  Entrance Stairs-Number of Steps: 1  Bathroom Shower/Tub: Tub/shower unit  Prior Level of Function:  Prior Function Per Pt/Caregiver Report  ADL Assistance: Independent  Homemaking Assistance: Needs assistance  Yard Work: Independent  Driving/Transportation: Independent  Ambulatory Assistance: Independent  Vocational: Retired  Leisure: gardening  Precautions:  Precautions  Hearing/Visual Limitations: DEAF R EAR, 10 HEARING L EAR,HAS CHOCLEAR IMPLANT, DECREASED VISIN NOTED DURING SESSION- GREATLY DECREASED L PERIPHERAL VISION- DOES NOT INDICATE  THAT HE CAN SEE THERAPISTS FINGER UNTIL IT IS DIRECTLYIN FRONT OF HIM,  FOR FOLLOWING FINGER, L EYE SHOWED LAG FROM R EYE  MOVEMENT  Precautions Comment: DYSPHAGIA, DIFFICULT AT TIMES TO UNDERSTAND PT.             Objective   Pain:  Pain Assessment  0-10 (Numeric) Pain Score: 0 - No pain  Cognition:  Cognition  Overall Cognitive Status: Within Functional Limits  Arousal/Alertness: Delayed responses to stimuli  Orientation Level: Disoriented to situation  Following Commands:  (FLAT AFFECT, SLOWER PROCESSING)  Problem Solving: Unable to assess  Attention: Exceptions to WFL  Sustained Attention:  (MILD DECREASE FATIGUUED? WITH ALLTHE TESTS DONE IN LAST 2 DAYS)  Memory: Within Funtional Limits  Safety/Judgement: Exceptions to WFL  Complex Functional Tasks: Minimal  Novel Situations: Minimal  Routine Tasks: Minimal  Unable to Self-Monitor and Self-Correct Consistently: Minimal  Insight: Mild  Impulsive: Mildly  Planning: Reduced planning skills  Organization: Mildly disorganized  Processing Speed: Delayed    General Assessments:  General Observation  General Observation: NTED NOTUSING LUE MUCH WHEN M OVING IN BED ADJUSTING COVERS ETC ? L HEMIANOPSIA VS NEGLECT? STANDING W/ FEET APART HAD PT REACH FOR THERAPISTS HAND OVERHEAD, 18 INCHES OUT FRONT AND  TO KNEE LEVEL- HE WAS ABLE TO REACH THERAPISTS HAND  BUT MOVES QUICKLY GIVING THE HAND A QUICK LIGHT TAP, NO LOB W/ THESE MOVEMENTS               Activity Tolerance  Endurance: Decreased tolerance for upright activites    Sensation  Light Touch:  (PT DENIES)    Strength  Strength Comments: ROM LEGS WFL; R HIP 3+/ R KNEE 4-, ANKLE 3+/5; LHIP 3+/ LKNEE/ANKLE 3+/5  DECREASED MUSCULAR ENDURANCE  Strength  Strength Comments: ROM LEGS WFL; R HIP 3+/ R KNEE 4-, ANKLE 3+/5; LHIP 3+/ LKNEE/ANKLE 3+/5  DECREASED MUSCULAR ENDURANCE                     Static Sitting Balance  Static Sitting-Comment/Number of Minutes: FAIR  Dynamic Sitting Balance  Dynamic Sitting-Comments: FAIR    Static Standing Balance  Static Standing-Comment/Number of Minutes: FAIR-  Dynamic Standing Balance  Dynamic Standing-Comments:  FAIR-  Functional Assessments:  Bed Mobility  Bed Mobility:  (SUPINE<>SIT SBA, VC TO MOVE SLOWLY AS LIKES TO MOVE QUICKLY/IMPULSIVELY)    Transfers  Transfer:  (SIT<>STAND CGA, MOVES QUICLY VC FOR SAFETY, WIDER STUART, LLE SLIGHTLYUNSTEADY)    Ambulation/Gait Training  Ambulation/Gait Training Performed:  (CGA/MIN A AMB IN JENSEN 40 FT, MOVES QUICKLY, WIDER STUART,  LLE FATIGUED W/ DISTANCE- THREATENING TO BUCKLE, KNEE/HIP FLEXED, FOOT FLAT GAIT)  Extremity/Trunk Assessments:     Outcome Measures:  [facility] Basic Mobility  Turning from your back to your side while in a flat bed without using bedrails: None  Moving from lying on your back to sitting on the side of a flat bed without using bedrails: A little  Moving to and from bed to chair (including a wheelchair): A little  Standing up from a chair using your arms (e.g. wheelchair or bedside chair): A little  To walk in hospital room: A little  Climbing 3-5 steps with railing: A lot  Basic Mobility - Total Score: 18    Encounter Problems       Encounter Problems (Active)       Mobility       STG - Patient will ambulate (Not Progressing)       Start:  03/17/25    Expected End:  03/24/25       150 FT PACING ACTIVITY FOR BEST LLE CONTROL, SBA, FWW/CANE FOR SAFETY PRN         STG - Patient will ambulate up and down a curb/step (Not Progressing)       Start:  03/17/25    Expected End:  03/22/25       FWW/CANE PRN FOR SAFETY, CGA         Goal 1 (Not Progressing)       Start:  03/17/25    Expected End:  04/07/25       20 REPS RROM INCREASING STRENGTH/COORDINATION &  ASSISTED BALANCE ACTIVITIES TO IMPROVE GAIT STABILITY            PT Transfers       STG - Patient will transfer sit to and from stand (Not Progressing)       Start:  03/17/25    Expected End:  03/20/25       SBA USING PROPER/SAFE TECHNIQUE              Pain - Adult              Education Documentation  Mobility Training, taught by Georgie Mijaers, PT at 3/17/2025  3:39 PM.  Learner: Patient  Readiness: Acceptance  Method:  Explanation  Response: Needs Reinforcement  Comment: MOBILITY SAFETY, NEED FOR CONTINUED REHAB    Education Comments  No comments found.

## 2025-03-17 NOTE — CONSULTS
History Of Present Illness  Isaac Condon is a 70 y.o. male left handed admitted to [facility] on 3/16/25 presenting with stroke symptoms. Neurology consulted for above.    Last known well: 7pm 3/14/25  Had stroke symptoms resolved at time of presentation: No    Inpatient consult to Neurology  Consult performed by: Gene Kohler MD  Consult ordered by: Johanna Chilel, IRIS-CNP      Listed history of bronchitis and hypertension, hearing loss status post cochlear implant.    Patient seen this afternoon, spouse at the bedside.    Patient apparently in his usual state of health until about 7 PM 3/14/2025.  He was making his dinner, when spouse noted he had trouble with his speech.  He was difficult to understand.  Spouse encouraged him to get checked out then, but he refused.  He did not have any other symptoms aside from the speech problem.  The next day, he was still having symptoms.  He slept the rest of the day and the rest of the night.  On 3/16/2025 afternoon, patient finally relented to come get checked out.  He presented to [facility] ED on 3/16/2025 afternoon for evaluation.  Initial vital signs documented blood pressure 179/109, heart rate 96, afebrile.  Possible stroke was suspected.  Head CT without contrast done did not show any acute findings, however there was some streak artifact on the right side.  Of note, not reported but patient appears to have interval development of hypodensity in the left corona radiata area not clearly present in December 2024 head CT without contrast. Pt admitted to the hospital, neurology consulted.    Pt not on statin or antiplatelet.    Pt still dysarthric, per wife worse than Friday.     On further questioning, pt also had 1 week history in Dec 2024 that he had dysarthria for. He apparently also did not get checked out then, but symptoms improved after 1 week.    I was informed that doing MRI is next to impossible with his model of cochlear implant--needing a special kit AND ENT to  be present--both of which are not available.    He is a 1ppd smoker. Drinks alcohol. Denies street drug use.      Review of Systems   Constitutional:  Negative for appetite change, chills and fever.   HENT:  Negative for ear pain and nosebleeds.    Eyes:  Negative for discharge and itching.   Respiratory:  Negative for choking and chest tightness.    Cardiovascular:  Negative for chest pain and palpitations.   Gastrointestinal:  Negative for abdominal distention, abdominal pain and nausea.   Endocrine: Negative for cold intolerance and heat intolerance.   Genitourinary:  Negative for difficulty urinating and dysuria.   Musculoskeletal:  Negative for gait problem and myalgias.   Neurological:  Negative for dizziness, seizures and numbness.     Past Medical History  Past Medical History:   Diagnosis Date    Bronchitis     HTN (hypertension)        Surgical History  Past Surgical History:   Procedure Laterality Date    COCHLEAR IMPLANT      FOOT SURGERY      toe fracture repair       Social History  Social History     Tobacco Use    Smoking status: Every Day     Current packs/day: 1.00     Types: Cigarettes     Passive exposure: Current    Smokeless tobacco: Former   Vaping Use    Vaping status: Never Used   Substance Use Topics    Alcohol use: Not Currently     Alcohol/week: 1.0 standard drink of alcohol     Types: 1 Cans of beer per week     Comment: socially    Drug use: Yes     Types: Marijuana       Home Meds  Medications Prior to Admission   Medication Sig Dispense Refill Last Dose/Taking    albuterol 90 mcg/actuation inhaler Inhale 2 puffs every 6 hours if needed for wheezing. 18 g 5     cholecalciferol (Vitamin D-3) 50 MCG (2000 UT) tablet Take 1 tablet (2,000 Units) by mouth once daily.       fluticasone propion-salmeteroL (Advair Diskus) 250-50 mcg/dose diskus inhaler Inhale 1 puff 2 times a day. 60 each 5        Allergies  Patient has no known allergies.    Current Meds  Scheduled medications  aspirin, 81  mg, oral, Daily  atorvastatin, 40 mg, oral, Nightly  clopidogrel, 300 mg, oral, Once   And  clopidogrel, 75 mg, oral, Daily  perflutren lipid microspheres, 0.5-10 mL of dilution, intravenous, Once in imaging  sennosides-docusate sodium, 1 tablet, oral, Nightly      Continuous medications     PRN medications  PRN medications: acetaminophen, albuterol, labetaloL, oxygen    Last Recorded Vitals  Blood pressure 154/67, pulse 77, temperature 36.5 °C (97.7 °F), temperature source Temporal, resp. rate 16, height 1.829 m (6'), weight 72.6 kg (160 lb), SpO2 94%.    Awake, alert, oriented, in no distress  Well-nourished, in bed  No leg edema    Mental status exam as above, conversant   Fair fund of knowledge  Recent/remote memory fair  Fair attention span  Pupils round reactive to light, 3-4 mm, (-) RAPD   Fundoscopic examination was attempted but fundus was not visualized bilaterally   Full EOMs intact, no nystagmus, no ptosis   V1 to V3 sensation is intact   (+) L facial droop (central)  Hearing grossly intact   (+) mild to moderate dysarthria  Good shoulder shrug bilaterally   Tongue is midline     Motor strength is 5/5 on all extremities, tone/bulk normal   Reflexes 1+ on all 4 extremities except trace to absent on B ankles, downgoing toes bilaterally   Sensation is intact to light touch, vibration on all 4 extremities   Finger to nose test intact bilaterally, some difficulty with fine finger movement on L hand  I did not have him stand or walk           NIH Stroke Scale  1A. Level of Consciousness: Alert, Keenly Responsive  1B. Ask Month and Age: Both Questions Right  1C. Blink Eyes & Squeeze Hands: Performs Both Tasks  2. Best Gaze: Normal  3. Visual: No Visual Loss  4. Facial Palsy: Partial Paralysis  5A. Motor - Left Arm: No Drift  5B. Motor - Right Arm: No Drift  6A. Motor - Left Leg: No Drift  6B. Motor - Right Leg: No Drift  7. Limb Ataxia: Present in One Limb  8. Sensory Loss: Normal  9. Best Language: No  Aphasia  10. Dysarthria: Mild-to-Moderate Dysarthria  11. Extinction and Inattention: No Abnormality  NIH Stroke Scale: 4    Relevant Results  I have personally reviewed the following:    Labs  Results for orders placed or performed during the hospital encounter of 03/16/25 (from the past 24 hours)   ECG 12 lead   Result Value Ref Range    Ventricular Rate 79 BPM    Atrial Rate 79 BPM    SC Interval 129 ms    QRS Duration 78 ms    QT Interval 373 ms    QTC Calculation(Bazett) 428 ms    P Axis 45 degrees    R Axis 22 degrees    T Axis 70 degrees    QRS Count 13 beats    Q Onset 251 ms    T Offset 437 ms    QTC Fredericia 409 ms   CBC   Result Value Ref Range    WBC 9.3 4.4 - 11.3 x10*3/uL    nRBC 0.0 0.0 - 0.0 /100 WBCs    RBC 4.96 4.50 - 5.90 x10*6/uL    Hemoglobin 16.7 13.5 - 17.5 g/dL    Hematocrit 47.5 41.0 - 52.0 %    MCV 96 80 - 100 fL    MCH 33.7 26.0 - 34.0 pg    MCHC 35.2 32.0 - 36.0 g/dL    RDW 12.6 11.5 - 14.5 %    Platelets 164 150 - 450 x10*3/uL   Comprehensive metabolic panel   Result Value Ref Range    Glucose 90 74 - 99 mg/dL    Sodium 137 136 - 145 mmol/L    Potassium 3.8 3.5 - 5.3 mmol/L    Chloride 99 98 - 107 mmol/L    Bicarbonate 25 21 - 32 mmol/L    Anion Gap 17 10 - 20 mmol/L    Urea Nitrogen 10 6 - 23 mg/dL    Creatinine 0.97 0.50 - 1.30 mg/dL    eGFR 85 >60 mL/min/1.73m*2    Calcium 9.9 8.6 - 10.3 mg/dL    Albumin 4.5 3.4 - 5.0 g/dL    Alkaline Phosphatase 101 33 - 136 U/L    Total Protein 7.2 6.4 - 8.2 g/dL    AST 18 9 - 39 U/L    Bilirubin, Total 1.2 0.0 - 1.2 mg/dL    ALT 16 10 - 52 U/L   Protime-INR   Result Value Ref Range    Protime 10.0 9.8 - 12.4 seconds    INR 0.9 0.9 - 1.1   Ethanol   Result Value Ref Range    Alcohol <10 <=10 mg/dL   Lipid Panel   Result Value Ref Range    Cholesterol 213 (H) 0 - 199 mg/dL    HDL-Cholesterol 110.7 mg/dL    Cholesterol/HDL Ratio 1.9     LDL Calculated 88 <=99 mg/dL    VLDL 14 0 - 40 mg/dL    Triglycerides 71 0 - 149 mg/dL    Non HDL Cholesterol 102  0 - 149 mg/dL   Hemoglobin A1C   Result Value Ref Range    Hemoglobin A1C 4.4 See comment %    Estimated Average Glucose 80 Not Established mg/dL   B-Type Natriuretic Peptide   Result Value Ref Range    BNP 28 0 - 99 pg/mL   POCT GLUCOSE   Result Value Ref Range    POCT Glucose 82 74 - 99 mg/dL   POCT GLUCOSE   Result Value Ref Range    POCT Glucose 70 (L) 74 - 99 mg/dL   Urinalysis with Reflex Culture and Microscopic   Result Value Ref Range    Color, Urine Yellow Light-Yellow, Yellow, Dark-Yellow    Appearance, Urine Clear Clear    Specific Gravity, Urine 1.020 1.005 - 1.035    pH, Urine 6.0 5.0, 5.5, 6.0, 6.5, 7.0, 7.5, 8.0    Protein, Urine 10 (TRACE) NEGATIVE, 10 (TRACE), 20 (TRACE) mg/dL    Glucose, Urine Normal Normal mg/dL    Blood, Urine NEGATIVE NEGATIVE mg/dL    Ketones, Urine 40 (2+) (A) NEGATIVE mg/dL    Bilirubin, Urine NEGATIVE NEGATIVE mg/dL    Urobilinogen, Urine Normal Normal mg/dL    Nitrite, Urine NEGATIVE NEGATIVE    Leukocyte Esterase, Urine NEGATIVE NEGATIVE   Extra Urine Gray Tube   Result Value Ref Range    Extra Tube Hold for add-ons.    Urinalysis Microscopic   Result Value Ref Range    WBC, Urine 1-5 1-5, NONE /HPF    RBC, Urine 1-2 NONE, 1-2, 3-5 /HPF    Mucus, Urine 1+ Reference range not established. /LPF   Basic Metabolic Panel   Result Value Ref Range    Glucose 88 74 - 99 mg/dL    Sodium 137 136 - 145 mmol/L    Potassium 3.5 3.5 - 5.3 mmol/L    Chloride 102 98 - 107 mmol/L    Bicarbonate 23 21 - 32 mmol/L    Anion Gap 16 10 - 20 mmol/L    Urea Nitrogen 14 6 - 23 mg/dL    Creatinine 0.85 0.50 - 1.30 mg/dL    eGFR >90 >60 mL/min/1.73m*2    Calcium 9.6 8.6 - 10.3 mg/dL   CBC   Result Value Ref Range    WBC 10.3 4.4 - 11.3 x10*3/uL    nRBC 0.0 0.0 - 0.0 /100 WBCs    RBC 5.00 4.50 - 5.90 x10*6/uL    Hemoglobin 16.7 13.5 - 17.5 g/dL    Hematocrit 48.4 41.0 - 52.0 %    MCV 97 80 - 100 fL    MCH 33.4 26.0 - 34.0 pg    MCHC 34.5 32.0 - 36.0 g/dL    RDW 12.6 11.5 - 14.5 %    Platelets 167 150  - 450 x10*3/uL   POCT GLUCOSE   Result Value Ref Range    POCT Glucose 87 74 - 99 mg/dL   POCT GLUCOSE   Result Value Ref Range    POCT Glucose 75 74 - 99 mg/dL   Transthoracic Echo (TTE) Complete   Result Value Ref Range    BSA 1.92 m2       Imaging results  No MRI head results found for the past 12 months   CT head wo IV contrast    Result Date: 3/16/2025  Interpreted By:  Cresencio Wilder, STUDY: CT HEAD WO IV CONTRAST;  3/16/2025 2:23 pm   INDICATION: Signs/Symptoms:cva.     COMPARISON: CT Brain, 1 December 2024 and 4 November 2013   ACCESSION NUMBER(S): [id number]   ORDERING CLINICIAN: CONNOR PÉREZ   TECHNIQUE: CT of the brain from the skull vertex to the skull base, without intravenous contrast   FINDINGS: ACUTE INTRA-AXIAL HEMORRHAGE:  Negative   ACUTE EXTRA-AXIAL/SUBDURAL HEMORRHAGE:  Negative   ACUTE INTRACRANIAL MASS EFFECT:  Negative   CT EVIDENCE OF ACUTE / SUBACUTE TERRITORIAL ISCHEMIA:  Negative   VENTRICLES:  New   OTHER BRAIN FINDINGS:  No interval change to the CT appearance of the brain and note of streak artifact on the right side limiting fine detail   INCLUDED PARANASAL SINUSES: All clear   INCLUDED MASTOID AIR CELLS: All clear   SKULL:  No lytic or blastic lesion   EXTRACRANIAL SOFT TISSUES:  Scalp and occular globes grossly normal by CT       NO ACUTE INTRACRANIAL PROCESS   MACRO: None   Signed by: Cresencio Wilder 3/16/2025 2:30 PM Dictation workstation:   [id number]    CT head wo IV contrast    Result Date: 12/1/2024  Interpreted By:  Ramses Santoro, STUDY: CT HEAD WO IV CONTRAST; ;  12/1/2024 5:52 pm   INDICATION: Signs/Symptoms:syncope.   COMPARISON: 11/04/2013   ACCESSION NUMBER(S): [id number]   ORDERING CLINICIAN: KULDEEP LUCIANO   TECHNIQUE: Contiguous unenhanced axial CT sections are performed from the skull base to the vertex.   The study is limited by postsurgical artifact at the level of the right temporoparietal calvarium.   FINDINGS: The osseous structures are intact.  There is mild polyposis along the medial wall of the right sphenoid compartment and minimal mucoperiosteal thickening of the ethmoid air cells. Postoperative changes are identified at the right mastoid air cells.   There is moderate generalized parenchymal volume loss with enlargement of the cortical sulci and CSF spaces. There is diffuse hypoattenuation in the cerebral white matter bilaterally compatible small vessel ischemia.   There is no sign of parenchymal hematoma or dense extra-axial fluid collection lack for postoperative artifact overlying the right temporoparietal calvarium. There is no mass effect or midline shift. The gray matter/white-matter differentiation is preserved.       Age-related atrophy and small-vessel ischemic changes of the cerebral white matter.   No CT evidence of acute intracranial hemorrhage or mass effect allowing for postoperative artifact at the level of the right temporoparietal calvarium.     MACRO: None   Signed by: Ramses Santoro 2024 6:17 PM Dictation workstation:   [id number]      1A. Level of Consciousness: 0  1B. Ask Month and Age: 0  1C. Blink Eyes & Squeeze Hands: 0  2. Best Gaze: 0  3. Visual: 0  4. Facial Palsy: 2  5A. Motor - Left Arm: 0  5B. Motor - Right Arm: 0  6A. Motor - Left Le  6B. Motor - Right Le  7. Limb Ataxia: 1  8. Sensory Loss: 0  9. Best Language: 0  10. Dysarthria: 1  11. Extinction and Inattention: 0  NIH Stroke Scale: 4   }    Assessment/Plan    Acute ischemic stroke by history, likely, 3/14/25  2.    Dysarthria  3.    LUE ataxia  4.    L facial droop  5.    Tobacco use disorder  Unable to do MRI due to cochlear implant  Not on ASA/statin at home  I do see a NEW hypodensity on L corona radiata area on head CT done this admit that was NOT clearly present on 24 head CT. I am unable to account his L facial droop with this--which tells me we are likely looking at another ischemic lesion on the right side of the cerebral hemisphere  that is not so evident on head CT at this time  Discussed    Recommendations:  Agree with start ASA 81mg daily (with food) + clopidogrel 75mg daily  Plan for DAPT x3 weeks, then ASA 81mg daily only  Start high intensity statin  Do CTA brain/neck w  5.    Do 2D echocardiogram with bubble study  6.   Allow permissive hypertension until today, can start tightening BP control tomorrow  7.   Control vascular risk factors  8.   Cardiac telemetry  9.   PT/OT/ST eval--ST eval not ordered, I ordered  10.   Quit smoking    Discussed with pt and spouse. Message sent to primary team. All questions answered. Please call with questions.    Gene Kohler MD  3/17/2025  2:45 PM

## 2025-03-17 NOTE — PROGRESS NOTES
[facility] MEDICINE PROGRESS NOTE    Subjective     Pt says his dysarthria and weakness are unchanged today.    Objective     I personally reviewed all pertinent labwork, imaging and vital signs, as well as nursing, therapy, discharge planning and consult notes.     Vitals:    03/17/25 0623   BP: 123/74   Pulse: 74   Resp: 16   Temp: 36 °C (96.8 °F)   SpO2: 94%       Vitals:    03/16/25 1407   Weight: 72.6 kg (160 lb)       Scheduled medications  aspirin, 81 mg, oral, Daily  atorvastatin, 40 mg, oral, Nightly  clopidogrel, 300 mg, oral, Once   And  clopidogrel, 75 mg, oral, Daily  perflutren lipid microspheres, 0.5-10 mL of dilution, intravenous, Once in imaging  sennosides-docusate sodium, 1 tablet, oral, Nightly      Continuous medications     PRN medications  PRN medications: acetaminophen, albuterol, labetaloL, oxygen    Results for orders placed or performed during the hospital encounter of 03/16/25 (from the past 24 hours)   POCT GLUCOSE   Result Value Ref Range    POCT Glucose 88 74 - 99 mg/dL   ECG 12 lead   Result Value Ref Range    Ventricular Rate 79 BPM    Atrial Rate 79 BPM    NM Interval 129 ms    QRS Duration 78 ms    QT Interval 373 ms    QTC Calculation(Bazett) 428 ms    P Axis 45 degrees    R Axis 22 degrees    T Axis 70 degrees    QRS Count 13 beats    Q Onset 251 ms    T Offset 437 ms    QTC Fredericia 409 ms   CBC   Result Value Ref Range    WBC 9.3 4.4 - 11.3 x10*3/uL    nRBC 0.0 0.0 - 0.0 /100 WBCs    RBC 4.96 4.50 - 5.90 x10*6/uL    Hemoglobin 16.7 13.5 - 17.5 g/dL    Hematocrit 47.5 41.0 - 52.0 %    MCV 96 80 - 100 fL    MCH 33.7 26.0 - 34.0 pg    MCHC 35.2 32.0 - 36.0 g/dL    RDW 12.6 11.5 - 14.5 %    Platelets 164 150 - 450 x10*3/uL   Comprehensive metabolic panel   Result Value Ref Range    Glucose 90 74 - 99 mg/dL    Sodium 137 136 - 145 mmol/L    Potassium 3.8 3.5 - 5.3 mmol/L    Chloride 99 98 - 107 mmol/L    Bicarbonate 25 21 - 32 mmol/L    Anion Gap 17 10 - 20 mmol/L    Urea  Nitrogen 10 6 - 23 mg/dL    Creatinine 0.97 0.50 - 1.30 mg/dL    eGFR 85 >60 mL/min/1.73m*2    Calcium 9.9 8.6 - 10.3 mg/dL    Albumin 4.5 3.4 - 5.0 g/dL    Alkaline Phosphatase 101 33 - 136 U/L    Total Protein 7.2 6.4 - 8.2 g/dL    AST 18 9 - 39 U/L    Bilirubin, Total 1.2 0.0 - 1.2 mg/dL    ALT 16 10 - 52 U/L   Protime-INR   Result Value Ref Range    Protime 10.0 9.8 - 12.4 seconds    INR 0.9 0.9 - 1.1   Ethanol   Result Value Ref Range    Alcohol <10 <=10 mg/dL   Lipid Panel   Result Value Ref Range    Cholesterol 213 (H) 0 - 199 mg/dL    HDL-Cholesterol 110.7 mg/dL    Cholesterol/HDL Ratio 1.9     LDL Calculated 88 <=99 mg/dL    VLDL 14 0 - 40 mg/dL    Triglycerides 71 0 - 149 mg/dL    Non HDL Cholesterol 102 0 - 149 mg/dL   Hemoglobin A1C   Result Value Ref Range    Hemoglobin A1C 4.4 See comment %    Estimated Average Glucose 80 Not Established mg/dL   B-Type Natriuretic Peptide   Result Value Ref Range    BNP 28 0 - 99 pg/mL   POCT GLUCOSE   Result Value Ref Range    POCT Glucose 82 74 - 99 mg/dL   POCT GLUCOSE   Result Value Ref Range    POCT Glucose 70 (L) 74 - 99 mg/dL   Urinalysis with Reflex Culture and Microscopic   Result Value Ref Range    Color, Urine Yellow Light-Yellow, Yellow, Dark-Yellow    Appearance, Urine Clear Clear    Specific Gravity, Urine 1.020 1.005 - 1.035    pH, Urine 6.0 5.0, 5.5, 6.0, 6.5, 7.0, 7.5, 8.0    Protein, Urine 10 (TRACE) NEGATIVE, 10 (TRACE), 20 (TRACE) mg/dL    Glucose, Urine Normal Normal mg/dL    Blood, Urine NEGATIVE NEGATIVE mg/dL    Ketones, Urine 40 (2+) (A) NEGATIVE mg/dL    Bilirubin, Urine NEGATIVE NEGATIVE mg/dL    Urobilinogen, Urine Normal Normal mg/dL    Nitrite, Urine NEGATIVE NEGATIVE    Leukocyte Esterase, Urine NEGATIVE NEGATIVE   Urinalysis Microscopic   Result Value Ref Range    WBC, Urine 1-5 1-5, NONE /HPF    RBC, Urine 1-2 NONE, 1-2, 3-5 /HPF    Mucus, Urine 1+ Reference range not established. /LPF   Basic Metabolic Panel   Result Value Ref Range     Glucose 88 74 - 99 mg/dL    Sodium 137 136 - 145 mmol/L    Potassium 3.5 3.5 - 5.3 mmol/L    Chloride 102 98 - 107 mmol/L    Bicarbonate 23 21 - 32 mmol/L    Anion Gap 16 10 - 20 mmol/L    Urea Nitrogen 14 6 - 23 mg/dL    Creatinine 0.85 0.50 - 1.30 mg/dL    eGFR >90 >60 mL/min/1.73m*2    Calcium 9.6 8.6 - 10.3 mg/dL   CBC   Result Value Ref Range    WBC 10.3 4.4 - 11.3 x10*3/uL    nRBC 0.0 0.0 - 0.0 /100 WBCs    RBC 5.00 4.50 - 5.90 x10*6/uL    Hemoglobin 16.7 13.5 - 17.5 g/dL    Hematocrit 48.4 41.0 - 52.0 %    MCV 97 80 - 100 fL    MCH 33.4 26.0 - 34.0 pg    MCHC 34.5 32.0 - 36.0 g/dL    RDW 12.6 11.5 - 14.5 %    Platelets 167 150 - 450 x10*3/uL   POCT GLUCOSE   Result Value Ref Range    POCT Glucose 87 74 - 99 mg/dL       Constitutional: Well developed, awake, alert, calm, oriented x4, no acute distress, cooperative   Eyes: EOMI, clear sclerae   ENMT: mucous membranes moist, no lesions seen   Head/Neck: Neck supple, no apparent injury, head atraumatic   Respiratory/Thorax: CTAB/diminished, good chest expansion, respirations even and unlabored   Cardiovascular: Regular rate and rhythm, no murmurs/rubs/gallops, normal S1 and S 2   Gastrointestinal: Abdomen nondistended, soft, nontender, +BS, no bruits   Musculoskeletal: ROM intact, no joint swelling, normal  strength   Extremities: no cyanosis, contusions or clubbing, or edema   Neurological: [place], dysarthria noted, L-sided facial droop, pt alert and oriented x4   Psychological: Appropriate affect and behavior   Skin: Warm and dry, no lesions, no rashes       Past Medical History:   Diagnosis Date    Bronchitis     HTN (hypertension)         Assessment/Plan     Strokelike symptoms  Pt presented with left-sided facial drooping with drooling, dysarthria and bilateral ataxia, onset two days PTA  CTH was nonacute  Continue stroke protocol with neuro checks, DAPT and statin  Echo and MRI brain w/o ordered, neurology consulted  Pt has a cochlear implant and will require  MRI kit be placed prior to MRI  Per [employer], only an ENT provider is permitted to place the kit, as we have no ENT services we are not able to perform the MRI, neurology is aware  Permissive HTN to maintain SBP > 180 mm Hg  SLP rolly, PT/OT     Hx HTN  Initially uncontrolled HTN has normalized without intervention  Pt is no longer on metoprolol 50 mg daily, resume as warranted     Hx bronchitis  Tobacco dependence  Pt has smoked 1 ppd since age 22, he declines nicotine patch   Continue home PRN albuterol, CXR was nonacute, mild hypoxia has resolved     Discharge disposition  Pending PT/OT evals      Johanna Chilel, CNP  [facility]

## 2025-03-17 NOTE — SIGNIFICANT EVENT
Neuro Brief Note    Consult received. I tried to see patient, he was in the process of being wheeled off the room for testing.    Will retry to see him later as time allows    Dr Kohler

## 2025-03-18 ENCOUNTER — APPOINTMENT (OUTPATIENT)
Dept: VASCULAR MEDICINE | Facility: HOSPITAL | Age: 70
End: 2025-03-18
Payer: MEDICARE

## 2025-03-18 LAB
GLUCOSE BLD MANUAL STRIP-MCNC: 173 MG/DL (ref 74–99)
GLUCOSE BLD MANUAL STRIP-MCNC: 87 MG/DL (ref 74–99)
GLUCOSE BLD MANUAL STRIP-MCNC: 88 MG/DL (ref 74–99)
GLUCOSE BLD MANUAL STRIP-MCNC: 93 MG/DL (ref 74–99)

## 2025-03-18 PROCEDURE — 2500000001 HC RX 250 WO HCPCS SELF ADMINISTERED DRUGS (ALT 637 FOR MEDICARE OP): Performed by: NURSE PRACTITIONER

## 2025-03-18 PROCEDURE — 99232 SBSQ HOSP IP/OBS MODERATE 35: CPT | Performed by: NURSE PRACTITIONER

## 2025-03-18 PROCEDURE — 1200000002 HC GENERAL ROOM WITH TELEMETRY DAILY

## 2025-03-18 PROCEDURE — 92526 ORAL FUNCTION THERAPY: CPT | Mod: GN | Performed by: SPEECH-LANGUAGE PATHOLOGIST

## 2025-03-18 PROCEDURE — 97535 SELF CARE MNGMENT TRAINING: CPT | Mod: GO,CO

## 2025-03-18 PROCEDURE — 97116 GAIT TRAINING THERAPY: CPT | Mod: GP,CQ

## 2025-03-18 PROCEDURE — 97110 THERAPEUTIC EXERCISES: CPT | Mod: GP,CQ

## 2025-03-18 PROCEDURE — 99222 1ST HOSP IP/OBS MODERATE 55: CPT | Performed by: INTERNAL MEDICINE

## 2025-03-18 PROCEDURE — 99221 1ST HOSP IP/OBS SF/LOW 40: CPT | Performed by: SURGERY

## 2025-03-18 PROCEDURE — 99233 SBSQ HOSP IP/OBS HIGH 50: CPT | Performed by: NURSE PRACTITIONER

## 2025-03-18 PROCEDURE — 93880 EXTRACRANIAL BILAT STUDY: CPT

## 2025-03-18 PROCEDURE — 93880 EXTRACRANIAL BILAT STUDY: CPT | Performed by: SURGERY

## 2025-03-18 PROCEDURE — 97110 THERAPEUTIC EXERCISES: CPT | Mod: GO,CO

## 2025-03-18 PROCEDURE — 82947 ASSAY GLUCOSE BLOOD QUANT: CPT

## 2025-03-18 RX ADMIN — ATORVASTATIN CALCIUM 40 MG: 40 TABLET, FILM COATED ORAL at 20:55

## 2025-03-18 RX ADMIN — CLOPIDOGREL 75 MG: 75 TABLET ORAL at 10:10

## 2025-03-18 RX ADMIN — SENNOSIDES AND DOCUSATE SODIUM 1 TABLET: 8.6; 5 TABLET ORAL at 20:55

## 2025-03-18 RX ADMIN — ASPIRIN 81 MG: 81 TABLET, COATED ORAL at 10:10

## 2025-03-18 ASSESSMENT — COGNITIVE AND FUNCTIONAL STATUS - GENERAL
TOILETING: A LITTLE
WALKING IN HOSPITAL ROOM: A LITTLE
DAILY ACTIVITIY SCORE: 19
PERSONAL GROOMING: A LITTLE
MOBILITY SCORE: 21
CLIMB 3 TO 5 STEPS WITH RAILING: A LOT
CLIMB 3 TO 5 STEPS WITH RAILING: A LITTLE
DRESSING REGULAR UPPER BODY CLOTHING: A LOT
TURNING FROM BACK TO SIDE WHILE IN FLAT BAD: A LITTLE
PERSONAL GROOMING: A LITTLE
EATING MEALS: A LITTLE
HELP NEEDED FOR BATHING: A LOT
DAILY ACTIVITIY SCORE: 15
DAILY ACTIVITIY SCORE: 19
CLIMB 3 TO 5 STEPS WITH RAILING: A LITTLE
MOVING TO AND FROM BED TO CHAIR: A LITTLE
EATING MEALS: A LITTLE
WALKING IN HOSPITAL ROOM: A LITTLE
EATING MEALS: A LITTLE
HELP NEEDED FOR BATHING: A LITTLE
MOBILITY SCORE: 21
MOVING TO AND FROM BED TO CHAIR: A LITTLE
HELP NEEDED FOR BATHING: A LITTLE
MOVING TO AND FROM BED TO CHAIR: A LITTLE
WALKING IN HOSPITAL ROOM: A LOT
STANDING UP FROM CHAIR USING ARMS: A LOT
DRESSING REGULAR LOWER BODY CLOTHING: A LITTLE
PERSONAL GROOMING: A LITTLE
TOILETING: A LITTLE
TOILETING: A LOT
DRESSING REGULAR LOWER BODY CLOTHING: A LITTLE
DRESSING REGULAR LOWER BODY CLOTHING: A LITTLE
MOBILITY SCORE: 16

## 2025-03-18 ASSESSMENT — PAIN SCALES - GENERAL
PAINLEVEL_OUTOF10: 0 - NO PAIN

## 2025-03-18 ASSESSMENT — PAIN - FUNCTIONAL ASSESSMENT
PAIN_FUNCTIONAL_ASSESSMENT: 0-10

## 2025-03-18 ASSESSMENT — ACTIVITIES OF DAILY LIVING (ADL): HOME_MANAGEMENT_TIME_ENTRY: 10

## 2025-03-18 NOTE — PROGRESS NOTES
Occupational Therapy    OT Treatment    Patient Name: Isaac Condon  MRN: [id number]  Department: [facility]  Room: [address] [place], [place] [address]  Today's Date: 3/18/2025  Time Calculation  Start Time: 1410  Stop Time: 1439  Time Calculation (min): 29 min        Assessment:  Barriers to Discharge Home: Physical needs  End of Session Patient Position: Up in chair, Alarm on (updated wife and daughter on patient progress.)     Plan:  Treatment Interventions: ADL retraining, UE strengthening/ROM, Functional transfer training, Endurance training, Patient/family training, Cognitive reorientation, Equipment evaluation/education, Neuromuscular reeducation, Fine motor coordination activities, Compensatory technique education    Subjective   Previous Visit Info:  OT Last Visit  OT Received On: 03/18/25  General:  General  Prior to Session Communication: Bedside nurse  Patient Position Received: Up in chair, Alarm on  General Comment: pt. is cooperative and pleasant. He is able to christina socks with cga from chair. perform Bilateral hand task well and completed LUE AROM ex. with WFL  no c/os pain. Pt. has flat affect at end of treatment appeared teary eyed when family arrived. Provided updated at pt. permission of his progress this session.  Precautions:        Date/Time Vitals Session Patient Position Pulse Resp SpO2 BP MAP (mmHg)    03/18/25 1337 --  --  83  16  95 %  145/91  --                 Pain:  Pain Assessment  Pain Assessment: 0-10  0-10 (Numeric) Pain Score: 0 - No pain    Objective    Cognition:  Cognition  Overall Cognitive Status: Within Functional Limits  Coordination:     Activities of Daily Living: LE Dressing  LE Dressing: Yes  Sock Level of Assistance: Contact guard  LE Dressing Where Assessed: Chair  LE Dressing Comments: pt. able to bring LE up toward body. Using LE to assist with pulling sock up over heel  Functional Standing Tolerance:   Bed Mobility/Transfers:  pt. Declined at this time requested to remain in chair            Therapy/Activity: Therapeutic Exercise  Therapeutic Exercise Performed: Yes  Therapeutic Exercise Activity 1: pt. completed  LUE ex. shoulder flexion/ext., abduction, chest press, opposition, grasp/release, wrist flexion/ext.  Therapeutic Exercise Activity 2: pt. performed picking up small objects,  bilateral hand task transfering water from R to L hand wihtout spilling 10 reps. good control. cued to use wrist and supination on LUE              Outcome Measures:[facility] Daily Activity  Putting on and taking off regular lower body clothing: A little  Bathing (including washing, rinsing, drying): A lot  Putting on and taking off regular upper body clothing: A lot  Toileting, which includes using toilet, bedpan or urinal: A lot  Taking care of personal grooming such as brushing teeth: A little  Eating Meals: A little  Daily Activity - Total Score: 15        Education Documentation  Home Exercise Program, taught by BASIM Sears at 3/18/2025  3:19 PM.  Learner: Patient  Readiness: Acceptance  Method: Explanation  Response: Verbalizes Understanding, Needs Reinforcement    ADL Training, taught by BASIM Sears at 3/18/2025  3:19 PM.  Learner: Patient  Readiness: Acceptance  Method: Explanation  Response: Verbalizes Understanding, Needs Reinforcement    Education Comments  No comments found.        OP EDUCATION:       Goals:  Encounter Problems       Encounter Problems (Active)       ADLs       Patient with complete lower body dressing with modified independent level of assistance donning and doffing all LE clothes  with PRN adaptive equipment while supported sitting and standing (Progressing)       Start:  03/17/25    Expected End:  03/31/25               EXERCISE/STRENGTHENING       Patient with increase LUE strength and coordination. (Progressing)       Start:  03/17/25    Expected End:  03/31/25               MOBILITY       Patient will perform Functional mobility max Household distances/Community  Distances with modified independent level of assistance and least restrictive device in order to improve safety and functional mobility. (Not Progressing)       Start:  03/17/25    Expected End:  03/31/25               TRANSFERS       Patient will complete functional transfers with least restrictive device with modified independent level of assistance. (Not Progressing)       Start:  03/17/25    Expected End:  03/31/25

## 2025-03-18 NOTE — CONSULTS
Inpatient consult to Cardiology  Consult performed by: Daniel Longoria MD  Consult ordered by: IRIS Puckett-CNP        History Of Present Illness:    Isaac Condon is a 70 y.o. male with PMHx of bronchitis and HTN who presented with left-sided facial drooping with drooling, dysarthia and bilateral ataxia, onset two days PTA. That morning he was having difficulty coordinating his movements, with difficulty ambulating. He had difficulty eating and drinking. He refused to seek medical attention and his wife finally convinced him to come to the ED today.  Family member stated that he had a stroke in December. (He came to the ED 12/1/24 for syncope and was orthostatic positive. ETOH was 46. He improved with IV fluids and was advised to increase his fluid consumption and decrease his alcohol consumption. CTH 12/1/24 revealed no evidence of acute intracranial hemorrhage or mass effect).    Echo with possible PFO  Last Recorded Vitals:  Vitals:    03/18/25 0800 03/18/25 0921 03/18/25 1200 03/18/25 1337   BP:  (!) 177/107  (!) 145/91   BP Location:  Left arm  Right arm   Patient Position:  Lying  Sitting   Pulse:  63  83   Resp:  16  16   Temp:  36.5 °C (97.7 °F)  37.2 °C (98.9 °F)   TempSrc:       SpO2: 97% 97% 98% 95%   Weight:       Height:           Last Labs:  CBC - 3/17/2025:  4:09 AM  10.3 16.7 167    48.4      CMP - 3/17/2025:  4:09 AM  9.6 7.2 18 --- 1.2   _ 4.5 16 101      PTT - No results in last year.  0.9   10.0 _     BNP   Date/Time Value Ref Range Status   03/16/2025 02:59 PM 28 0 - 99 pg/mL Final     Hemoglobin A1C   Date/Time Value Ref Range Status   03/16/2025 02:59 PM 4.4 See comment % Final   09/28/2020 09:16 AM 5.0 % Final     Comment:          Diagnosis of Diabetes-Adults   Non-Diabetic: < or = 5.6%   Increased risk for developing diabetes: 5.7-6.4%   Diagnostic of diabetes: > or = 6.5%  .       Monitoring of Diabetes                Age (y)     Therapeutic Goal (%)   Adults:          >18            <7.0   Pediatrics:    13-18           <7.5                   7-12           <8.0                   0- 6            7.5-8.5   American Diabetes Association. Diabetes Care 33(S1), Jan 2010.     05/28/2020 10:06 AM 4.9 % Final     Comment:          Diagnosis of Diabetes-Adults   Non-Diabetic: < or = 5.6%   Increased risk for developing diabetes: 5.7-6.4%   Diagnostic of diabetes: > or = 6.5%  .       Monitoring of Diabetes                Age (y)     Therapeutic Goal (%)   Adults:          >18           <7.0   Pediatrics:    13-18           <7.5                   7-12           <8.0                   0- 6            7.5-8.5   American Diabetes Association. Diabetes Care 33(S1), Jan 2010.       LDL Calculated   Date/Time Value Ref Range Status   03/16/2025 02:59 PM 88 <=99 mg/dL Final     Comment:                                 Near   Borderline      AGE      Desirable  Optimal    High     High     Very High     0-19 Y     0 - 109     ---    110-129   >/= 130     ----    20-24 Y     0 - 119     ---    120-159   >/= 160     ----      >24 Y     0 -  99   100-129  130-159   160-189     >/=190     08/29/2024 10:28  (H) <=99 mg/dL Final     Comment:                                 Near   Borderline      AGE      Desirable  Optimal    High     High     Very High     0-19 Y     0 - 109     ---    110-129   >/= 130     ----    20-24 Y     0 - 119     ---    120-159   >/= 160     ----      >24 Y     0 -  99   100-129  130-159   160-189     >/=190       VLDL   Date/Time Value Ref Range Status   03/16/2025 02:59 PM 14 0 - 40 mg/dL Final   08/29/2024 10:28 AM 15 0 - 40 mg/dL Final   08/21/2023 10:02 AM 20 0 - 40 mg/dL Final   11/17/2022 09:33 AM 13 0 - 40 mg/dL Final   01/18/2022 09:48 AM 14 0 - 40 mg/dL Final      Last I/O:  I/O last 3 completed shifts:  In: 250 (3.4 mL/kg) [P.O.:250]  Out: 275 (3.8 mL/kg) [Urine:275 (0.1 mL/kg/hr)]  Weight: 72.6 kg     Past Cardiology Tests (Last 3 Years):  EKG:  ECG 12 lead  03/16/2025    Echo:  Transthoracic Echo (TTE) Complete 03/17/2025    Ejection Fractions:  EF   Date/Time Value Ref Range Status   03/17/2025 12:52 PM 64 %      Cath:  No results found for this or any previous visit from the past 1095 days.    Stress Test:  No results found for this or any previous visit from the past 1095 days.    Cardiac Imaging:  No results found for this or any previous visit from the past 1095 days.      Past Medical History:  He has a past medical history of Bronchitis and HTN (hypertension).    Past Surgical History:  He has a past surgical history that includes Cochlear implant and Foot surgery.      Social History:  He reports that he has been smoking cigarettes. He has been exposed to tobacco smoke. He has quit using smokeless tobacco. He reports that he does not currently use alcohol after a past usage of about 1.0 standard drink of alcohol per week. He reports current drug use. Drug: Marijuana.    Family History:  Family History   Problem Relation Name Age of Onset    Cancer Mother      COPD Father          Allergies:  Patient has no known allergies.    Inpatient Medications:  Scheduled medications   Medication Dose Route Frequency    aspirin  81 mg oral Daily    atorvastatin  40 mg oral Nightly    clopidogrel  300 mg oral Once    And    clopidogrel  75 mg oral Daily    fluticasone furoate-vilanteroL  1 puff inhalation Daily    perflutren lipid microspheres  0.5-10 mL of dilution intravenous Once in imaging    sennosides-docusate sodium  1 tablet oral Nightly     PRN medications   Medication    acetaminophen    albuterol    guaiFENesin    labetaloL    oxygen     Continuous Medications   Medication Dose Last Rate     Outpatient Medications:  Current Outpatient Medications   Medication Instructions    albuterol 90 mcg/actuation inhaler 2 puffs, inhalation, Every 6 hours PRN    cholecalciferol (VITAMIN D-3) 2,000 Units, Daily    fluticasone propion-salmeteroL (Advair Diskus) 250-50 mcg/dose  diskus inhaler 1 puff, inhalation, 2 times daily RT       Physical Exam:  Constitutional: Well developed, awake, alert, calm, oriented x4, no acute distress, cooperative, disheveled   Eyes: EOMI, clear sclerae   ENMT: mucous membranes moist, no lesions seen   Head/Neck: Neck supple, no apparent injury, head atraumatic   Respiratory/Thorax: CTAB/diminished, good chest expansion, respirations even and unlabored   Cardiovascular: Regular rate and rhythm, no murmurs/rubs/gallops, normal S1 and S 2   Gastrointestinal: Abdomen nondistended, soft, nontender, +BS, no bruits   Musculoskeletal: ROM intact, no joint swelling, normal  strength   Extremities: no cyanosis, contusions or clubbing, or edema   Neurological: [place], dysarthria noted, L-sided facial droop, pt alert and oriented x4   Psychological: Appropriate affect and behavior   Skin: Warm and dry, no lesions, no rashes        Assessment/Plan   1) CVA  Seems to be due to Carotid artery disease  I dont think this is related to PFO  Vascular surgery to see  Monitor in Tele  Outpatient follow up  Peripheral IV 03/16/25 20 G Left Antecubital (Active)   Site Assessment Clean;Intact;Dry 03/18/25 0847   Dressing Status Clean;Dry 03/18/25 0847   Number of days: 2       Code Status:  No Order    I spent 30 minutes in the professional and overall care of this patient.        Daniel Longoria MD

## 2025-03-18 NOTE — PROGRESS NOTES
Isaac Condon is a 70 y.o. male on day 2 of admission presenting with Cerebrovascular accident (CVA), unspecified mechanism (Multi).    Subjective   Patient resting in bed. Denies chest pain, shortness of breath, headache, abdominal pain, fevers, chills. Family at bedside, questions answered.        Objective     Physical Exam  Constitutional:       Appearance: Normal appearance.   HENT:      Head: Normocephalic and atraumatic.      Mouth/Throat:      Mouth: Mucous membranes are moist.      Pharynx: Oropharynx is clear.   Eyes:      Extraocular Movements: Extraocular movements intact.      Conjunctiva/sclera: Conjunctivae normal.      Pupils: Pupils are equal, round, and reactive to light.   Cardiovascular:      Rate and Rhythm: Normal rate and regular rhythm.      Pulses: Normal pulses.      Heart sounds: Normal heart sounds.   Pulmonary:      Effort: Pulmonary effort is normal.      Breath sounds: Normal breath sounds.   Abdominal:      General: Bowel sounds are normal.      Palpations: Abdomen is soft.      Tenderness: There is no abdominal tenderness.   Musculoskeletal:         General: Normal range of motion.      Cervical back: Normal range of motion and neck supple.   Skin:     General: Skin is warm and dry.      Capillary Refill: Capillary refill takes less than 2 seconds.   Neurological:      General: No focal deficit present.      Mental Status: He is alert and oriented to person, place, and time.   Psychiatric:         Mood and Affect: Mood normal.         Behavior: Behavior normal.         Last Recorded Vitals  Blood pressure (!) 177/107, pulse 63, temperature 36.5 °C (97.7 °F), resp. rate 16, height 1.829 m (6'), weight 72.6 kg (160 lb), SpO2 98%.  Intake/Output last 3 Shifts:  I/O last 3 completed shifts:  In: 250 (3.4 mL/kg) [P.O.:250]  Out: 275 (3.8 mL/kg) [Urine:275 (0.1 mL/kg/hr)]  Weight: 72.6 kg     Relevant Results  Results for orders placed or performed during the hospital encounter of  03/16/25 (from the past 24 hours)   POCT GLUCOSE   Result Value Ref Range    POCT Glucose 87 74 - 99 mg/dL   POCT GLUCOSE   Result Value Ref Range    POCT Glucose 87 74 - 99 mg/dL   POCT GLUCOSE   Result Value Ref Range    POCT Glucose 88 74 - 99 mg/dL   POCT GLUCOSE   Result Value Ref Range    POCT Glucose 173 (H) 74 - 99 mg/dL     Carotid duplex bilateral    Result Date: 3/18/2025  Preliminary [place]iology [place] [address]      Phone [phone] Fax [phone]      Preliminary Vascular Lab Report  [facility] US CAROTID ARTERY DUPLEX BILATERAL  Patient Name:     VISHAL HARRIS Reading Physician:  [id number] Toyin Cortés MD Study Date:       3/18/2025      Ordering Provider:  [id number] OH RUSSELL MRN/PID:          [id number]       Fellow: Accession#:       [id number]   Technologist:       María Head RVT YOB: 1955      Technologist 2: Gender:           M              Encounter#:         [id number] Admission Status: Inpatient      Location Performed: [facility]  Diagnosis/ICD: Other specified symptoms and signs involving the circulatory and                respiratory systems-R09.89 CPT Codes:     64244 Cerebrovascular Carotid Duplex scan complete  PRELIMINARY CONCLUSIONS:  Right Carotid: Findings are consistent with less than 50% stenosis of the right proximal internal carotid artery. Laminar flow seen by color Doppler. Right external carotid artery appears patent with no evidence of stenosis. The right vertebral artery is patent with antegrade flow. No evidence of hemodynamically significant stenosis in the right subclavian artery. Left Carotid: Findings are consistent with less than 50% stenosis of the left proximal internal carotid artery. Laminar flow seen by color Doppler. Left external carotid artery appears patent with no evidence of stenosis. The left vertebral artery is patent with antegrade flow. No evidence  of hemodynamically significant stenosis in the left subclavian artery.  Imaging & Doppler Findings: Right Plaque Morph: The proximal right internal carotid artery demonstrates calcified plaque. The distal right common carotid artery demonstrates calcified plaque. Left Plaque Morph: The proximal left internal carotid artery demonstrates calcified plaque. The distal left common carotid artery demonstrates calcified plaque.   Right                      Left   PSV     EDV                PSV     EDV 86 cm/s 7 cm/s    CCA P    72 cm/s 13 cm/s 71 cm/s 8 cm/s    CCA D    48 cm/s 7 cm/s 84 cm/s 16 cm/s   ICA P    46 cm/s 10 cm/s 48 cm/s 10 cm/s   ICA M    83 cm/s 13 cm/s 51 cm/s 14 cm/s   ICA D    68 cm/s 15 cm/s 78 cm/s 2 cm/s     ECA     62 cm/s 6 cm/s 28 cm/s 4 cm/s  Vertebral  33 cm/s 9 cm/s 93 cm/s 0 cm/s  Subclavian 94 cm/s 0 cm/s                Right Left ICA/CCA Ratio  1.2  1.0   VASCULAR PRELIMINARY REPORT completed by María Head RVT on 3/18/2025 at 9:28:31 AM  ** Final **     CT angio head and neck w and wo IV contrast    Result Date: 3/17/2025  Interpreted By:  Evans Motley, STUDY: CT ANGIO HEAD AND NECK W AND WO IV CONTRAST;  3/17/2025 3:43 pm   INDICATION: Signs/Symptoms:stroke, unable to do MRI.     COMPARISON: CT head yesterday.   ACCESSION NUMBER(S): [id number]   ORDERING CLINICIAN: OH RUSSELL   TECHNIQUE: Unenhanced CT images of the head were obtained.  Subsequently,  50 ML of Omnipaque 350 was administered intravenously and axial images of the head and neck were acquired.  Coronal, sagittal, and 3-D reconstructions were provided for review.   FINDINGS:   CTA COW: No major vascular occlusion. Heavy atherosclerotic calcifications through carotid siphons. Suspected severe segmental stenosis cavernous segment right internal carotid artery. No aneurysms.  No vascular malformations. Patent dural venous sinuses and intracranial deep venous structures.   CTA CAROTID: Origins of the great vessels from  the aortic arch are incompletely imaged on this study. There appears to be at least moderate short-segment atherosclerotic stenosis proximal right internal carotid artery. There is also moderate short-segment atherosclerotic narrowing of the proximal left subclavian artery.. Severe short-segment stenosis distal right subclavian artery.   Bulky mixed calcific and fibrofatty atherosclerotic plaque across the right carotid bifurcation. Narrowest luminal diameter is 1.4 mm which is right at the bifurcation. This corresponds to approximately 70% stenosis. On the left there is slightly lesser atherosclerotic involvement without significant luminal narrowing.   Moderate short-segment atherosclerotic stenosis origin of the right vertebral artery.   NONVASCULAR HEAD: No acute intracranial hemorrhage. No intracranial mass or significant mass effect. Global brain parenchymal volume loss and ventriculomegaly similar to prior. No evidence of large evolving cortical infarction.   NONVASCULAR NECK: No soft tissue mass. No adenopathy. Salivary glands are unremarkable. Thyroid gland unremarkable. Bones intact. Moderate to advanced multilevel degenerative cervical spondylosis.         1. No intracranial major vascular occlusion. 2. Heavy atherosclerotic calcifications through carotid siphons. Suspected severe segmental stenosis cavernous segment right internal carotid artery. 3. Approximately 70% short-segment atherosclerotic stenosis of the right carotid bifurcation. 4. Severe short-segment stenosis distal right subclavian artery. 5. Otherwise foci of stenosis as described above. 6. Global brain parenchymal volume loss and ventriculomegaly similar to prior.   MACRO: None   Signed by: Evans Motley 3/17/2025 7:41 PM Dictation workstation:   [id number]    Transthoracic Echo (TTE) Complete    Result Date: 3/17/2025              [place] [id number]      Phone [phone] Fax  [phone] TRANSTHORACIC ECHOCARDIOGRAM REPORT Patient Name:       VISHAL HARRIS       Reading Physician:    [id number] Dave Wagner MD Study Date:         3/17/2025            Ordering Provider:    [id number] CHEL ROSS MRN/PID:            [id number]             Fellow: Accession#:         [id number]         Nurse: Date of Birth/Age:  1955 / 70 years Sonographer:          Melina Wren RDCS Gender Assigned at                      Additional Staff: Birth: Height:             182.88 cm            Admit Date:           3/16/2025 Weight:             72.57 kg             Admission Status:     Inpatient -                                                                Routine BSA / BMI:          1.94 m2 / 21.70      Department Location:  [address]                     kg/m2 Blood Pressure: 145 /84 mmHg Study Type:    TRANSTHORACIC ECHO (TTE) COMPLETE Diagnosis/ICD: Cerebral Infarction, unspecified-I63.9 Indication:    Cerebrovascular Accident CPT Codes:     Echo Complete w Full Doppler-06580  Study Detail: The following Echo studies were performed: 2D, M-Mode, Doppler and               color flow. Technically challenging study due to body habitus,               prominent lung artifact and poor acoustic windows. Optison used as               a contrast agent for endocardial border definition and agitated               saline used as a contrast agent for intraseptal flow evaluation.               Total contrast used for this procedure was 2 mL via IV push.  PHYSICIAN INTERPRETATION: Left Ventricle: The left ventricular systolic function is normal, with a Shukla's biplane calculated ejection fraction of 64%. There are no regional wall motion abnormalities. The left ventricular cavity size is normal. There is normal septal and  normal posterior left ventricular wall thickness. Spectral Doppler shows a normal pattern of left ventricular diastolic filling. Left Atrium: The left atrial size is normal. A bubble study using agitated saline was performed. Bubble study is positive. Right Ventricle: The right ventricle is normal in size. There is normal right ventricular global systolic function. Right Atrium: The right atrial size is normal. Aortic Valve: The aortic valve was not well visualized. The aortic valve dimensionless index is 0.84. There is no evidence of aortic valve regurgitation. The peak instantaneous gradient of the aortic valve is 4 mmHg. The mean gradient of the aortic valve is 2 mmHg. Mitral Valve: The mitral valve was not well visualized. There is no evidence of mitral valve regurgitation. Tricuspid Valve: The tricuspid valve was not well visualized. There is physiological tricuspid regurgitation. Pulmonic Valve: The pulmonic valve is not well visualized. There is physiologic pulmonic valve regurgitation. Pericardium: No pericardial effusion noted. Aorta: The aortic root is normal.  CONCLUSIONS:  1. The left ventricular systolic function is normal, with a Shukla's biplane calculated ejection fraction of 64%.  2. There is normal right ventricular global systolic function.  3. A bubble study using agitated saline was performed. Bubble study is positive. QUANTITATIVE DATA SUMMARY:  2D MEASUREMENTS:             Normal Ranges: IVSd:            0.67 cm     (0.6-1.1cm) LVPWd:           0.69 cm     (0.6-1.1cm) LVIDd:           3.91 cm     (3.9-5.9cm) LVIDs:           1.83 cm LV Mass Index:   37.2 g/m2 LVEDV Index:     32.74 ml/m2 LV % FS          53.1 %  LEFT ATRIUM:                  Normal Ranges: LA Vol A4C:        18.5 ml    (22+/-6mL/m2) LA Vol A2C:        17.0 ml LA Vol BP:         21.0 ml LA Vol Index A4C:  9.6ml/m2 LA Vol Index A2C:  8.8 ml/m2 LA Vol Index BP:   10.9 ml/m2 LA Area A4C:       9.9 cm2 LA Area A2C:       8.0 cm2  LA Major Axis A4C: 4.5 cm LA Major Axis A2C: 3.2 cm LA Vol A4C:        17.5 ml LA Vol A2C:        16.0 ml LA Vol Index BSA:  8.6 ml/m2  RIGHT ATRIUM:          Normal Ranges: RA Area A4C:  10.1 cm2  AORTA MEASUREMENTS:         Normal Ranges: Ao Sinus, d:        2.90 cm (2.1-3.5cm) Asc Ao, d:          3.40 cm (2.1-3.4cm)  LV SYSTOLIC FUNCTION:                      Normal Ranges: EF-A4C View:    67 % (>=55%) EF-A2C View:    60 % EF-Biplane:     64 % LV EF Reported: 64 %  LV DIASTOLIC FUNCTION:             Normal Ranges: MV Peak E:             0.40 m/s    (0.7-1.2 m/s) MV Peak A:             0.64 m/s    (0.42-0.7 m/s) E/A Ratio:             0.63        (1.0-2.2) MV e'                  0.065 m/s   (>8.0) MV lateral e'          0.07 m/s MV medial e'           0.06 m/s MV A Dur:              114.18 msec E/e' Ratio:            6.18        (<8.0)  MITRAL VALVE:          Normal Ranges: MV DT:        328 msec (150-240msec)  AORTIC VALVE:                     Normal Ranges: AoV Vmax:                1.00 m/s (<=1.7m/s) AoV Peak P.0 mmHg (<20mmHg) AoV Mean P.1 mmHg (1.7-11.5mmHg) LVOT Max Ace:            0.82 m/s (<=1.1m/s) AoV VTI:                 15.15 cm (18-25cm) LVOT VTI:                12.74 cm LVOT Diameter:           2.02 cm  (1.8-2.4cm) AoV Area, VTI:           2.69 cm2 (2.5-5.5cm2) AoV Area,Vmax:           2.65 cm2 (2.5-4.5cm2) AoV Dimensionless Index: 0.84  RIGHT VENTRICLE: RV Basal 3.50 cm RV Mid   2.90 cm RV Major 6.8 cm TAPSE:   26.6 mm RV s'    0.14 m/s  TRICUSPID VALVE/RVSP:         Normal Ranges: IVC Diam:             1.28 cm  AORTA: Asc Ao Diam 3.38 cm  [id number] Dave Wagner MD Electronically signed on 3/17/2025 at 5:23:22 PM  ** Final **     ECG 12 lead    Result Date: 3/17/2025  Sinus rhythm RSR' in V1 or V2, probably normal variant See ED provider note for full interpretation and clinical correlation Confirmed by Mickie Queen ([id number]) on 3/17/2025 10:40:08 AM    XR chest 1  view    Result Date: 3/16/2025  Interpreted By:  Jose Johnson, STUDY: XR CHEST 1 VIEW;  3/16/2025 2:33 pm   INDICATION: Signs/Symptoms:chf.     COMPARISON: CT chest 05/30/2024   ACCESSION NUMBER(S): [id number]   ORDERING CLINICIAN: CONNOR PÉREZ   FINDINGS:         CARDIOMEDIASTINAL SILHOUETTE: Cardiomediastinal silhouette is normal in size and configuration.   LUNGS: Lungs are clear.   ABDOMEN: No remarkable upper abdominal findings.   BONES: No acute osseous changes.       1.  No evidence of acute cardiopulmonary process.       MACRO: None   Signed by: Jose Johnson 3/16/2025 2:58 PM Dictation workstation:   [id number]    CT head wo IV contrast    Result Date: 3/16/2025  Interpreted By:  Cresencio Wilder, STUDY: CT HEAD WO IV CONTRAST;  3/16/2025 2:23 pm   INDICATION: Signs/Symptoms:cva.     COMPARISON: CT Brain, 1 December 2024 and 4 November 2013   ACCESSION NUMBER(S): [id number]   ORDERING CLINICIAN: CONNOR PÉREZ   TECHNIQUE: CT of the brain from the skull vertex to the skull base, without intravenous contrast   FINDINGS: ACUTE INTRA-AXIAL HEMORRHAGE:  Negative   ACUTE EXTRA-AXIAL/SUBDURAL HEMORRHAGE:  Negative   ACUTE INTRACRANIAL MASS EFFECT:  Negative   CT EVIDENCE OF ACUTE / SUBACUTE TERRITORIAL ISCHEMIA:  Negative   VENTRICLES:  New   OTHER BRAIN FINDINGS:  No interval change to the CT appearance of the brain and note of streak artifact on the right side limiting fine detail   INCLUDED PARANASAL SINUSES: All clear   INCLUDED MASTOID AIR CELLS: All clear   SKULL:  No lytic or blastic lesion   EXTRACRANIAL SOFT TISSUES:  Scalp and occular globes grossly normal by CT       NO ACUTE INTRACRANIAL PROCESS   MACRO: None   Signed by: Cresencio Wilder 3/16/2025 2:30 PM Dictation workstation:   [id number]       Assessment/Plan   Strokelike symptoms  Pt presented with left-sided facial drooping with drooling, dysarthria and bilateral ataxia, onset two days PTA  CTH was nonacute  Continue stroke protocol  with neuro checks, DAPT and statin  Echo LVEF 64%. Positive bubble study   Neurology consulted  Pt has a cochlear implant and will require MRI kit be placed prior to MRI  Per [employer], only an ENT provider is permitted to place the kit, as we have no ENT services we are not able to perform the MRI, neurology is aware  CT angio head and neck as above    Carotid ultrasound with < 50% stenosis bilaterally   SLP eval, PT/OT     Hx HTN  Initially uncontrolled HTN has normalized without intervention  Pt is no longer on metoprolol 50 mg daily, resume as warranted     Hx bronchitis  Tobacco dependence  Pt has smoked 1 ppd since age 22, he declines nicotine patch   Continue home PRN albuterol, CXR was nonacute, mild hypoxia has resolved    DVT prophylaxis   ASA/Plavix   SCDs      Discharge disposition  Pending PT/OT palmira Ulrich, APRN-CNP

## 2025-03-18 NOTE — DISCHARGE INSTRUCTIONS
YOUR PERSONAL STROKE SNAPSHOT:  You have had a stroke event (stroke, TIA or hemorrhage). Please see below for your information from this hospitalization, page numbers correlate with the stroke education booklet attached for further explanation of how these numbers and values relate to your stroke event.     You have had a Ischemic stroke. This event likely happened because of large artery atherosclerosis (plaque buildup).    Your Lab Results (see Page 11):    Lipids (cholesterol) - LDL goal is <70 mg/dl:  Lab Results   Component Value Date    LDLCALC 88 03/16/2025    .7 03/16/2025    CHOL 213 (H) 03/16/2025    TRIG 71 03/16/2025       A1C (blood sugars) - goal is <7%:  Lab Results   Component Value Date    HGBA1C 4.4 03/16/2025             Your Stroke Risk factors (Pages 10 & 11):  Check what applies.    [] Obesity    [] Diabetes    [x] Smoking    [x] Alcohol Use    [x] Hypertension    [x] High Cholesterol    [] Atrial Fibrillation    [] Sleep Apnea    [x] Carotid Artery Disease    [] Clotting Disorder    [] Active Cancer    [] Family History    [] Heart failure    [] Coronary Artery Disease (CAD)    [] Other (______________________________________)      Your Related Medications (Page 12):    New medications:    Aspirin 81 mg and Plavix 75 mg daily x 90 days (Haley 15, 2025) - then stop Plavix and stay on Aspirin 81 mg daily with food  Atorvastatin    Discharge Plan (Pages 16 & 17):    Discharge to: [] Home    [x] Acute Rehab    [] Skilled Nursing/Rehabilitation    [] Other (__________________________)    Therapies:      [x] Physical Therapy    [x] Occupational Therapy    [x] Speech Therapy    [] Cognitive Therapy

## 2025-03-18 NOTE — CARE PLAN
The patient's goals for the shift include  safety    The clinical goals for the shift include Pt will remain free from falls/injuries throughout shift    Over the shift, the patient did make progress toward the following goals.

## 2025-03-18 NOTE — CONSULTS
Reason For Consult  Stroke like symptoms    History Of Present Illness  Isaac Condon is a 70 y.o. male presenting with facial drooping, dysarthria and difficulty ambulating. His wife states that she noticed left facial drooping, drooling and impaired speech 2 days ago. He also developed left sided weakness and difficulty eating/drinking which prompted him to seek medical attention. He did present to the ED in 12/2024 after a fall at home after a suspected syncopal episode. Per the patient's wife, she thought she noticed some facial asymmetry and left sided weakness at that time. CTA head/neck and carotid duplex showing bilateral carotid stenosis. No other acute abnormalities appreciated on imaging. He does complain of continue left facial drooping, dysarthria and left upper extremity weakness. Denies any headaches, vision changes or lightheadedness.      Past Medical History  He has a past medical history of Bronchitis and HTN (hypertension).    Surgical History  He has a past surgical history that includes Cochlear implant and Foot surgery.     Social History  He reports that he has been smoking cigarettes. He has been exposed to tobacco smoke. He has quit using smokeless tobacco. He reports that he does not currently use alcohol after a past usage of about 1.0 standard drink of alcohol per week. He reports current drug use. Drug: Marijuana.    Family History  Family History   Problem Relation Name Age of Onset    Cancer Mother      COPD Father          Allergies  Patient has no known allergies.    Review of Systems  I performed a complete 12 point review of systems and it is negative except as noted in HPI or above. All other systems have been reviewed and are negative.     Physical Exam    Constitutional: Pleasant and cooperative. Laying in bed in no acute distress. Conversant.   Skin: Warm and dry; no obvious lesions, rashes, pallor, or jaundice. Good turgor.   Eyes: EOMI. Anicteric sclera.   ENT: Mucous  membranes moist; no obvious injury or deformity appreciated.   Head and Neck: Normocephalic, atraumatic  Respiratory: Nonlabored on RA. Lungs clear to auscultation bilaterally without obvious adventitious sounds. Chest rise is equal.  Cardiovascular: RRR. No gross murmur, gallop, or rub. Extremities are warm and well-perfused with good capillary refill (< 3 seconds). No chest wall tenderness.   MSK: No gross abnormalities appreciated. 5/5 muscle strength bilaterally except for decreased  strength on left  Extremities: No cyanosis, edema, or clubbing evident. Neurovascularly intact.   Neuro: A&Ox3. Able to respond to questions appropriately but slowly. Dysarthria noted with left facial weakness/drooping  Psych: Appropriate mood and behavior.      Last Recorded Vitals  Blood pressure (!) 145/91, pulse 83, temperature 37.2 °C (98.9 °F), resp. rate 16, height 1.829 m (6'), weight 72.6 kg (160 lb), SpO2 95%.       Assessment/Plan     Bilateral carotid stenosis with stroke like symptoms  Patient does have bilateral carotid stenosis on duplex ultrasound with CTA head/neck suggesting worse stenosis on the right  No acute intervention indicated, but given his stroke-like symptoms he would likely benefit from surgical intervention as an outpatient  Continue stroke protocol with high dose statin and DAPT   Will plan for outpatient follow-up to discuss next steps    DACIA HENRY    Seen and personally examined  Agree with above plan  Patient has 60-70% right carotid stenosis which appears to be symptomatic  Patient still with a left facial droop and left upper extremity weakness and ataxia as well as dysarthria  Secondary to persistent symptoms I would wait 2 weeks before acute carotid intervention  Patient appears to be TCAR candidate  Will follow

## 2025-03-18 NOTE — PROGRESS NOTES
Spoke to pt's wife at bedside. Agreeable to AR recommendation. Patient was provided with a [employer] list of skilled AR agencies that are in network with patient's insurance payor, service patient's preferred geographic region, and that displays CMS star ratings. #1  and #2 [facility]. Referral sent. Pt is straight Medicare can be admitted with accepting facility. TCC following.     1501: Family now requesting #1 [facility] Sou[facility] Coffey #2 [facility] and #3 [facility]. Referral sent.

## 2025-03-18 NOTE — CARE PLAN
The patient's goals for the shift include  comfort    The clinical goals for the shift include Patient will remain safe and free from falls    Over the shift, the patient did make progress toward the following goals.

## 2025-03-18 NOTE — PROGRESS NOTES
[facility] Progress Note    Isaac Condon is a 70 y.o. male on day 2 of admission presenting with Cerebrovascular accident (CVA), unspecified mechanism (Multi).  Subjective   Pt seen this afternoon by myself for first time, initially seen by Dr. Kohler. Just seen by Dr. Crooks. Pt resting in bed. Several family members at bedside. Pt is alert and oriented. Family feels symptoms are slightly improving since arrival. Pt states he has been working with PT today and feels he was able to get around a little better.        Objective     Vitals:    03/18/25 0800 03/18/25 0921 03/18/25 1200 03/18/25 1337   BP:  (!) 177/107  (!) 145/91   BP Location:  Left arm  Right arm   Patient Position:  Lying  Sitting   Pulse:  63  83   Resp:  16  16   Temp:  36.5 °C (97.7 °F)  37.2 °C (98.9 °F)   TempSrc:       SpO2: 97% 97% 98% 95%   Weight:       Height:             Physical Exam  Vitals reviewed.   Neurological:      Cranial Nerves: Dysarthria present.      Motor: Motor strength is normal.      Neurological Exam  Mental Status  Awake, alert and oriented to person, place and time. Moderate dysarthria present. Language is fluent with no aphasia.    Cranial Nerves  CN II-XII grossly intact other than moderate L facial droop.    Motor  Normal muscle bulk throughout. No fasciculations present. Normal muscle tone. No abnormal involuntary movements. Strength is 5/5 throughout all four extremities.    Sensory  Light touch is normal in upper and lower extremities.     Coordination  Right: Finger-to-nose normal. Heel-to-shin normal.Left: Finger-to-nose abnormality: Dysmetria. Heel-to-shin abnormality: Slight dysmetria.    Gait    Not tested, fall risk.      Scheduled medications  aspirin, 81 mg, oral, Daily  atorvastatin, 40 mg, oral, Nightly  clopidogrel, 300 mg, oral, Once   And  clopidogrel, 75 mg, oral, Daily  fluticasone furoate-vilanteroL, 1 puff, inhalation, Daily  perflutren lipid microspheres, 0.5-10 mL of dilution, intravenous,  Once in imaging  sennosides-docusate sodium, 1 tablet, oral, Nightly      Continuous medications     PRN medications  PRN medications: acetaminophen, albuterol, guaiFENesin, labetaloL, oxygen     Lab Results   Component Value Date    WBC 10.3 03/17/2025    RBC 5.00 03/17/2025    HGB 16.7 03/17/2025    HCT 48.4 03/17/2025     03/17/2025     03/17/2025    K 3.5 03/17/2025     03/17/2025    BUN 14 03/17/2025    CREATININE 0.85 03/17/2025    EGFR >90 03/17/2025    CALCIUM 9.6 03/17/2025    ALKPHOS 101 03/16/2025    AST 18 03/16/2025    ALT 16 03/16/2025    VITD25 27 (L) 01/17/2025    HGBA1C 4.4 03/16/2025    LDLDIRECT 121 05/28/2020    LDLCALC 88 03/16/2025    CHOL 213 (H) 03/16/2025    .7 03/16/2025    TRIG 71 03/16/2025    TSH 3.62 08/29/2024    TSH 2.27 08/21/2023    TSH 2.70 07/01/2022       Below CT and MRI images and reports have been personally reviewed in PACS, agree with interpretations.    CT angio head and neck w and wo IV contrast 03/17/2025    Narrative  Interpreted By:  Evans Motley,  STUDY:  CT ANGIO HEAD AND NECK W AND WO IV CONTRAST;  3/17/2025 3:43 pm    INDICATION:  Signs/Symptoms:stroke, unable to do MRI.      COMPARISON:  CT head yesterday.    ACCESSION NUMBER(S):  [id number]    ORDERING CLINICIAN:  OH RUSSELL    TECHNIQUE:  Unenhanced CT images of the head were obtained.  Subsequently,  50 ML  of Omnipaque 350 was administered intravenously and axial images of  the head and neck were acquired.  Coronal, sagittal, and 3-D  reconstructions were provided for review.    FINDINGS:    CTA COW: No major vascular occlusion. Heavy atherosclerotic  calcifications through carotid siphons. Suspected severe segmental  stenosis cavernous segment right internal carotid artery. No  aneurysms.  No vascular malformations. Patent dural venous sinuses  and intracranial deep venous structures.    CTA CAROTID: Origins of the great vessels from the aortic arch are  incompletely imaged on  this study. There appears to be at least  moderate short-segment atherosclerotic stenosis proximal right  internal carotid artery. There is also moderate short-segment  atherosclerotic narrowing of the proximal left subclavian artery..  Severe short-segment stenosis distal right subclavian artery.    Bulky mixed calcific and fibrofatty atherosclerotic plaque across the  right carotid bifurcation. Narrowest luminal diameter is 1.4 mm which  is right at the bifurcation. This corresponds to approximately 70%  stenosis. On the left there is slightly lesser atherosclerotic  involvement without significant luminal narrowing.    Moderate short-segment atherosclerotic stenosis origin of the right  vertebral artery.    NONVASCULAR HEAD: No acute intracranial hemorrhage. No intracranial  mass or significant mass effect. Global brain parenchymal volume loss  and ventriculomegaly similar to prior. No evidence of large evolving  cortical infarction.    NONVASCULAR NECK: No soft tissue mass. No adenopathy. Salivary glands  are unremarkable. Thyroid gland unremarkable. Bones intact. Moderate  to advanced multilevel degenerative cervical spondylosis.    Impression  1. No intracranial major vascular occlusion.  2. Heavy atherosclerotic calcifications through carotid siphons.  Suspected severe segmental stenosis cavernous segment right internal  carotid artery.  3. Approximately 70% short-segment atherosclerotic stenosis of the  right carotid bifurcation.  4. Severe short-segment stenosis distal right subclavian artery.  5. Otherwise foci of stenosis as described above.  6. Global brain parenchymal volume loss and ventriculomegaly similar  to prior.    MACRO:  None    Signed by: Evans Motley 3/17/2025 7:41 PM  Dictation workstation:   [id number]      Transthoracic Echo (TTE) Complete With Contrast 03/17/2025    PHYSICIAN INTERPRETATION:  Left Ventricle: The left ventricular systolic function is normal, with a Shukla's biplane  calculated ejection fraction of 64%. There are no regional wall motion abnormalities. The left ventricular cavity size is normal. There is normal septal and normal posterior left ventricular wall thickness. Spectral Doppler shows a normal pattern of left ventricular diastolic filling.  Left Atrium: The left atrial size is normal. A bubble study using agitated saline was performed. Bubble study is positive.  Right Ventricle: The right ventricle is normal in size. There is normal right ventricular global systolic function.  Right Atrium: The right atrial size is normal.  Aortic Valve: The aortic valve was not well visualized. The aortic valve dimensionless index is 0.84. There is no evidence of aortic valve regurgitation. The peak instantaneous gradient of the aortic valve is 4 mmHg. The mean gradient of the aortic valve is 2 mmHg.  Mitral Valve: The mitral valve was not well visualized. There is no evidence of mitral valve regurgitation.  Tricuspid Valve: The tricuspid valve was not well visualized. There is physiological tricuspid regurgitation.  Pulmonic Valve: The pulmonic valve is not well visualized. There is physiologic pulmonic valve regurgitation.  Pericardium: No pericardial effusion noted.  Aorta: The aortic root is normal.      CONCLUSIONS:  1. The left ventricular systolic function is normal, with a Shukla's biplane calculated ejection fraction of 64%.  2. There is normal right ventricular global systolic function.  3. A bubble study using agitated saline was performed. Bubble study is positive.      CT head wo IV contrast 03/16/2025    Narrative  Interpreted By:  Cresencio Wilder,  STUDY:  CT HEAD WO IV CONTRAST;  3/16/2025 2:23 pm    INDICATION:  Signs/Symptoms:cva.      COMPARISON:  CT Brain, 1 December 2024 and 4 November 2013    ACCESSION NUMBER(S):  [id number]    ORDERING CLINICIAN:  CONNOR PÉREZ    TECHNIQUE:  CT of the brain from the skull vertex to the skull base, without  intravenous  contrast    FINDINGS:  ACUTE INTRA-AXIAL HEMORRHAGE:  Negative    ACUTE EXTRA-AXIAL/SUBDURAL HEMORRHAGE:  Negative    ACUTE INTRACRANIAL MASS EFFECT:  Negative    CT EVIDENCE OF ACUTE / SUBACUTE TERRITORIAL ISCHEMIA:  Negative    VENTRICLES:  New    OTHER BRAIN FINDINGS:  No interval change to the CT appearance of the  brain and note of streak artifact on the right side limiting fine  detail    INCLUDED PARANASAL SINUSES: All clear    INCLUDED MASTOID AIR CELLS: All clear    SKULL:  No lytic or blastic lesion    EXTRACRANIAL SOFT TISSUES:  Scalp and occular globes grossly normal  by CT    Impression  NO ACUTE INTRACRANIAL PROCESS    MACRO:  None    Signed by: Cresencio Wilder 3/16/2025 2:30 PM  Dictation workstation:   [id number]      NIH Stroke Scale  1A. Level of Consciousness: Alert, Keenly Responsive  1B. Ask Month and Age: Both Questions Right  1C. Blink Eyes & Squeeze Hands: Performs Both Tasks  2. Best Gaze: Normal  3. Visual: No Visual Loss  4. Facial Palsy: Partial Paralysis  5A. Motor - Left Arm: No Drift  5B. Motor - Right Arm: No Drift  6A. Motor - Left Leg: No Drift  6B. Motor - Right Leg: No Drift  7. Limb Ataxia: Present in Two Limbs  8. Sensory Loss: Normal  9. Best Language: No Aphasia  10. Dysarthria: Mild-to-Moderate Dysarthria  11. Extinction and Inattention: No Abnormality  NIH Stroke Scale: 5          Mikki Coma Scale  Best Eye Response: Spontaneous  Best Verbal Response: Oriented  Best Motor Response: Follows commands  Mikki Coma Scale Score: 15        Assessment/Plan      Assessment & Plan  Cerebrovascular accident (CVA), unspecified mechanism (Multi)      IMPRESSION:  Isaac Condon is a 70 y.o. male with history of bronchitis, HTN and hearing loss s/p cochlear implant presented for dysarthria x 2 days PTA. Initial BP was 179/109. Not on antiplatelet or statin PTA.   HCT - no acute findings but some streak artifact on R side and ? Interval development of hypodensity to L corona radiata (not  on HCT in December 2024) (not reported by radiologist)  CTA head/neck - heavy atherosclerosis through carotid siphons, severe stenosis R ICA cavernous segment, ~70% stenosis R carotid bifurcation and severe short segment stenosis distal R subclavian  Carotid US <50% stenosis bilaterally  Lipid panel with LDL 88, CHOL 213, TG 71  A1C 4.4%  TTE with normal EF and LA size, + bubble study    Suspect acute ischemic stroke, clinically, 3/14/25  Dysarthria  LUE and LLE dysmetria  L facial droop  Tobacco use disorder  R ICA stenosis, symptomatic  ? PFO  HTN    RECOMMENDATIONS:  Continue supportive care  Continue DAPT of 81 mg aspirin and 75 mg Plavix x at least 90 days from neuro standpoint (with large vessel intracranial stenosis), then stop plavix and stay on 81 mg aspirin monotherapy  Continue HI statin daily  Smoking cessation  Ok to slowly normalize BP, >72 hours since symptom onset  Continue to monitor and manage vascular risk factors.   Cardiology consult appreciate, PFO less likely related to current symptoms  Unable to get MRI brain here due to cochlear implant. Still treat as stroke  Continue PT/OT and SLP, recommending acute rehab  Agree with waiting 2 weeks prior to carotid intervention, vascular surgery consult appreciated    Discussed with patient and family, all questions answered  Case managed with Dr. Kohler.   Will sign off from neurology. Please reach out if further input needed.   Pt to follow up with myself in ~ 4-8 weeks outpatient.        I personally spent 55 minutes today, exclusive of procedures, providing care for this patient, including preparation, face to face time, documentation and other services such as review of medical records, diagnostic result, patient education, counseling, coordination of care as specified in the encounter.    Johanna Keen, APRN-CNP

## 2025-03-18 NOTE — PROGRESS NOTES
Speech-Language Pathology Clinical Swallow Treatment    Patient Name: Isaac Condon  MRN: [id number]  : 1955  Today's Date: 25  Start time: Start Time: 1620  Stop time: Stop Time: 165  Time calculation (min) : Time Calculation (min): 39 min    ASSESSMENT  SLP TX Intervention Outcome: Making Progress Towards Goals     Treatment Tolerance: Patient tolerated treatment well  Prognosis: Good     IMPRESSIONS: Pt continues to demonstrate moderate dysarthria, but improving with over articulation speech.   Continues to demonstrate moderate oral phase dysphagia d/t being edentulous with weak lingual and jaw strength.     Continue a modified diet of Soft bite-sized diet and THIN Liquids.    Pt would benefit from skilled ST to minimize aspiration risk and ensure ongoing safety with the least restrictive diet as well as improve speech intelligibility through over articulation strategies and oral motor exercises against resistance.       PLAN  Recommended solid consistency: Soft/bite-sized (IDDSI level 6)  Recommended liquid consistency: Thin (IDDSI 0)  Recommended medication administration: Whole in thin liquid, Per pt preference, Per nursing discretion  Safe swallow strategies:  - Upright positioning for all PO intake  - Remain upright for >30 min after meals  - Slow rate of intake  - Small bites  - One bite/sip at a time  - Alternate bites and sips  - Effortful swallow  - Chew thoroughly  - Supervision recommended during meals  - Do not feed unless pt is fully alert and upright  - Lingual sweep to clear oral residue  - Check for pocketing during/after meals  - Sips via straw are ok    Discharge recommendation: Recommend HIGH intensity ST upon DC in order to ensure safety with least restrictive diet.  Inpatient/Swing Bed or Outpatient: Inpatient  SLP TX Plan: Continue Plan of Care  SLP Plan: Skilled SLP  SLP Frequency: 2x per week  Duration: 2 weeks  Next Treatment Priority: reassess swallow function in order to  determine if safe to continue modified diet of soft bite-sized foods and thin liquids. Dysarthria exercises for lips and tongue.  Discussed POC: Patient, Nursing, Physician  Patient/Caregiver Agreeable: Yes      Goals (start date 03/17/2025 for two weeks END 03/31/2025):  - Pt will consume prescribed diet (current diet is Soft bite-sized diet and THIN Liquids) without overt s/sx aspiration/penetration in 95% of observed trials.              Status: Progressing              Progress this date: Pt ate bites of breakfast - wife reported pt didn't like it. However, no syrup on trays and they didn't ask for syrup from staff.  Pt and family encouraged to ask for items pt needs to make foods taste better.       - Pt will demonstrate follow-through of trained compensatory strategies during a meal/snack with 90% acc independently.              Status: Ongoing              Progress this date: Pt not hungry - waiting for dinner tray.       - Pt will complete oral/pharyngeal/laryngeal strengthening exercises as directed given min verbal and visual cues from SLP in order to improve swallow function.              Status: Progressing              Progress this date: Pt completed over articulation exercises with improved intelligibility for short sentences and phrases.  Pt required min verbal and visual cues.  Pt with improved lingual ROM and able to hold exercises for 5 seconds each.  Pt completed tongue pops 10/10 with improved loudness of pop with verbal and visual cues to go slow and suck the tongue to the roof.  Added labial exercises (smile, open jaw) improved ROM with verbal and visual cues.  SLP provided massage to the left side of face with slight improvement in lower labial movement/retraction.      - Pt/family will demonstrate understanding of education related to dysphagia independently.              Status: Progressing              Progress this date: SLP provided pt with handouts of  exercises  (sentences/phrases/multi-syllabic words) to practice over articulation.        SUBJECTIVE  Prior to Session Communication: Bedside nurse  RN cleared pt to participate in session.  Respiratory status: Room air  Positioning: Upright in bed  Pt was alert, pleasant, and cooperative for session.  Pt noted he's been practicing his tongue exercises and over articulation of ALEJANDRO and HUDSON.     Pain Assessment: 0-10  0-10 (Numeric) Pain Score: 0 - No pain        ORIENTATION: Ox4    OBJECTIVE  Therapeutic swallow intervention:  Therapeutic Swallow  Therapeutic Swallow Intervention : Compensatory Strategies, Oral Strengthening Techniques  Solid Diet Recommendations: Soft & bite sized/chopped (IDDSI Level 6)  Liquid Diet Recommendations: Thin (IDDSI Level 0)    Oral motor exercises - see goals section for details.    Over articulation exercises - see goals section for details.     Treatment/Education:  Results and recommendations were relayed to: Patient, Family, Bedside nurse, and Physician  Education provided: Yes   Learner: Patient, Significant other, and Family   Barriers to learning: None and Acuteness of illness barrier   Method of teaching: Verbal, Written, and Demonstration   Topic: role of ST, results of assessment, risk for aspiration, recommended diet modifications, recommended safe swallow strategies, and recommendation for dysphagia follow-up, dysphagia exercises, review of handouts   Outcome of teaching: Pt/family demonstrated good understanding and teachback/return demonstration

## 2025-03-18 NOTE — PROGRESS NOTES
Physical Therapy    Physical Therapy Treatment    Patient Name: Isaac Condon  MRN: [id number]  Department: [address]  Room: [address] [place] [address]  Today's Date: 3/18/2025  Time Calculation  Start Time: 1300  Stop Time: 1338  Time Calculation (min): 38 min         Assessment/Plan   PT Assessment  End of Session Communication: Bedside nurse  Assessment Comment: patient increased gait to  80 feet, with focus on arm swing and  improved posture. .  End of Session Patient Position: Up in chair, Alarm on  PT Plan  Inpatient/Swing Bed or Outpatient: Inpatient  PT Plan  Treatment/Interventions: Transfer training, Gait training, Stair training, Balance training, Strengthening, Endurance training  PT Plan: Ongoing PT  PT Frequency: 5 times per week  PT Discharge Recommendations: High intensity level of continued care  Equipment Recommended upon Discharge: Wheeled walker, Straight cane (TBD)  PT Recommended Transfer Status: Assist x1  PT - OK to Discharge: Yes (WHEN MEDICALLY CLEARED)      General Visit Information:   PT  Visit  PT Received On: 03/18/25  Response to Previous Treatment: Patient reporting fatigue but able to participate.  General  Prior to Session Communication: Bedside nurse  Patient Position Received: Bed, 3 rail up, Alarm on  General Comment: patient  in bed eager for therapy.    Subjective   Precautions:        Date/Time Vitals Session Patient Position Pulse Resp SpO2 BP MAP (mmHg)    03/18/25 1337 --  --  83  16  95 %  145/91  --                 Objective   Pain:  Pain Assessment  Pain Assessment: 0-10  0-10 (Numeric) Pain Score: 0 - No pain (no complaints)  Cognition:  Cognition  Overall Cognitive Status: Within Functional Limits (but [place])    Activity Tolerance:  Activity Tolerance  Endurance: Tolerates 30 min exercise with multiple rests  Treatments:   patient performed KAILEE LE ankle pumps, glute sets, hip abduction, SAQ  heel slides 20 reps each, bridging 5 reps  x 5 seconds each. sit to stand with min assist . static  standing x 3 mins cGa. gait training without device with min assist to perform left arm swing 80 feet x3  with dual taking , 3/3 with delayed response.  stand to sit with min assist    Outcome Measures:  [facility] Basic Mobility  Turning from your back to your side while in a flat bed without using bedrails: None  Moving from lying on your back to sitting on the side of a flat bed without using bedrails: A little  Moving to and from bed to chair (including a wheelchair): A little  Standing up from a chair using your arms (e.g. wheelchair or bedside chair): A lot  To walk in hospital room: A lot  Climbing 3-5 steps with railing: A lot  Basic Mobility - Total Score: 16    Education Documentation  Mobility Training, taught by Vicki Espinoza PTA at 3/18/2025  2:40 PM.  Learner: Patient  Readiness: Acceptance  Method: Explanation  Response: Verbalizes Understanding    Education Comments  No comments found.        OP EDUCATION:       Encounter Problems       Encounter Problems (Active)       Mobility       STG - Patient will ambulate (Progressing)       Start:  03/17/25    Expected End:  03/24/25       150 FT PACING ACTIVITY FOR BEST LLE CONTROL, SBA, FWW/CANE FOR SAFETY PRN         STG - Patient will ambulate up and down a curb/step (Progressing)       Start:  03/17/25    Expected End:  03/22/25       FWW/CANE PRN FOR SAFETY, CGA         Goal 1 (Progressing)       Start:  03/17/25    Expected End:  04/07/25       20 REPS RROM INCREASING STRENGTH/COORDINATION &  ASSISTED BALANCE ACTIVITIES TO IMPROVE GAIT STABILITY            PT Transfers       STG - Patient will transfer sit to and from stand (Progressing)       Start:  03/17/25    Expected End:  03/20/25       SBA USING PROPER/SAFE TECHNIQUE              Pain - Adult

## 2025-03-19 ENCOUNTER — TELEPHONE (OUTPATIENT)
Dept: CARDIOLOGY | Facility: HOSPITAL | Age: 70
End: 2025-03-19
Payer: MEDICARE

## 2025-03-19 ENCOUNTER — ANCILLARY PROCEDURE (OUTPATIENT)
Dept: CARDIOLOGY | Facility: HOSPITAL | Age: 70
End: 2025-03-19
Payer: MEDICARE

## 2025-03-19 VITALS
HEART RATE: 90 BPM | OXYGEN SATURATION: 96 % | BODY MASS INDEX: 21.67 KG/M2 | HEIGHT: 72 IN | DIASTOLIC BLOOD PRESSURE: 94 MMHG | WEIGHT: 160 LBS | RESPIRATION RATE: 18 BRPM | TEMPERATURE: 97.3 F | SYSTOLIC BLOOD PRESSURE: 145 MMHG

## 2025-03-19 DIAGNOSIS — R55 VASOVAGAL SYNCOPE: ICD-10-CM

## 2025-03-19 DIAGNOSIS — I63.9 CEREBROVASCULAR ACCIDENT (CVA), UNSPECIFIED MECHANISM (MULTI): ICD-10-CM

## 2025-03-19 LAB
ANION GAP SERPL CALC-SCNC: 19 MMOL/L (ref 10–20)
BUN SERPL-MCNC: 16 MG/DL (ref 6–23)
CALCIUM SERPL-MCNC: 9.5 MG/DL (ref 8.6–10.3)
CHLORIDE SERPL-SCNC: 100 MMOL/L (ref 98–107)
CO2 SERPL-SCNC: 24 MMOL/L (ref 21–32)
CREAT SERPL-MCNC: 0.83 MG/DL (ref 0.5–1.3)
EGFRCR SERPLBLD CKD-EPI 2021: >90 ML/MIN/1.73M*2
ERYTHROCYTE [DISTWIDTH] IN BLOOD BY AUTOMATED COUNT: 12.4 % (ref 11.5–14.5)
GLUCOSE BLD MANUAL STRIP-MCNC: 112 MG/DL (ref 74–99)
GLUCOSE BLD MANUAL STRIP-MCNC: 88 MG/DL (ref 74–99)
GLUCOSE SERPL-MCNC: 88 MG/DL (ref 74–99)
HCT VFR BLD AUTO: 47.1 % (ref 41–52)
HGB BLD-MCNC: 16.9 G/DL (ref 13.5–17.5)
MCH RBC QN AUTO: 34 PG (ref 26–34)
MCHC RBC AUTO-ENTMCNC: 35.9 G/DL (ref 32–36)
MCV RBC AUTO: 95 FL (ref 80–100)
NRBC BLD-RTO: 0 /100 WBCS (ref 0–0)
PLATELET # BLD AUTO: 146 X10*3/UL (ref 150–450)
POTASSIUM SERPL-SCNC: 3.2 MMOL/L (ref 3.5–5.3)
RBC # BLD AUTO: 4.97 X10*6/UL (ref 4.5–5.9)
SODIUM SERPL-SCNC: 140 MMOL/L (ref 136–145)
WBC # BLD AUTO: 8.7 X10*3/UL (ref 4.4–11.3)

## 2025-03-19 PROCEDURE — 97110 THERAPEUTIC EXERCISES: CPT | Mod: GP,CQ

## 2025-03-19 PROCEDURE — 92526 ORAL FUNCTION THERAPY: CPT | Mod: GN | Performed by: SPEECH-LANGUAGE PATHOLOGIST

## 2025-03-19 PROCEDURE — 82947 ASSAY GLUCOSE BLOOD QUANT: CPT

## 2025-03-19 PROCEDURE — 2500000002 HC RX 250 W HCPCS SELF ADMINISTERED DRUGS (ALT 637 FOR MEDICARE OP, ALT 636 FOR OP/ED): Performed by: NURSE PRACTITIONER

## 2025-03-19 PROCEDURE — 80048 BASIC METABOLIC PNL TOTAL CA: CPT | Performed by: NURSE PRACTITIONER

## 2025-03-19 PROCEDURE — 97530 THERAPEUTIC ACTIVITIES: CPT | Mod: GO,CO

## 2025-03-19 PROCEDURE — 36415 COLL VENOUS BLD VENIPUNCTURE: CPT | Performed by: NURSE PRACTITIONER

## 2025-03-19 PROCEDURE — 93246 EXT ECG>7D<15D RECORDING: CPT

## 2025-03-19 PROCEDURE — 2500000001 HC RX 250 WO HCPCS SELF ADMINISTERED DRUGS (ALT 637 FOR MEDICARE OP): Performed by: NURSE PRACTITIONER

## 2025-03-19 PROCEDURE — 99239 HOSP IP/OBS DSCHRG MGMT >30: CPT | Performed by: NURSE PRACTITIONER

## 2025-03-19 PROCEDURE — 2500000004 HC RX 250 GENERAL PHARMACY W/ HCPCS (ALT 636 FOR OP/ED): Performed by: NURSE PRACTITIONER

## 2025-03-19 PROCEDURE — 97116 GAIT TRAINING THERAPY: CPT | Mod: GP,CQ

## 2025-03-19 PROCEDURE — 97110 THERAPEUTIC EXERCISES: CPT | Mod: GO,CO

## 2025-03-19 PROCEDURE — 85027 COMPLETE CBC AUTOMATED: CPT | Performed by: NURSE PRACTITIONER

## 2025-03-19 RX ORDER — POLYETHYLENE GLYCOL 3350 17 G/17G
17 POWDER, FOR SOLUTION ORAL DAILY
Status: DISCONTINUED | OUTPATIENT
Start: 2025-03-19 | End: 2025-03-19 | Stop reason: HOSPADM

## 2025-03-19 RX ORDER — LOSARTAN POTASSIUM 25 MG/1
25 TABLET ORAL DAILY
Start: 2025-03-19

## 2025-03-19 RX ORDER — POTASSIUM CHLORIDE 20 MEQ/1
20 TABLET, EXTENDED RELEASE ORAL ONCE
Status: COMPLETED | OUTPATIENT
Start: 2025-03-19 | End: 2025-03-19

## 2025-03-19 RX ORDER — POLYETHYLENE GLYCOL 3350 17 G/17G
17 POWDER, FOR SOLUTION ORAL DAILY
Start: 2025-03-19

## 2025-03-19 RX ORDER — AMOXICILLIN 250 MG
1 CAPSULE ORAL NIGHTLY
Start: 2025-03-19

## 2025-03-19 RX ORDER — ASPIRIN 81 MG/1
81 TABLET ORAL DAILY
Start: 2025-03-20

## 2025-03-19 RX ORDER — LOSARTAN POTASSIUM 25 MG/1
25 TABLET ORAL DAILY
Status: DISCONTINUED | OUTPATIENT
Start: 2025-03-19 | End: 2025-03-19 | Stop reason: HOSPADM

## 2025-03-19 RX ORDER — ATORVASTATIN CALCIUM 40 MG/1
40 TABLET, FILM COATED ORAL NIGHTLY
Qty: 30 TABLET | Refills: 1 | Status: SHIPPED | OUTPATIENT
Start: 2025-03-19

## 2025-03-19 RX ORDER — CLOPIDOGREL BISULFATE 75 MG/1
75 TABLET ORAL DAILY
Qty: 30 TABLET | Refills: 1 | Status: SHIPPED | OUTPATIENT
Start: 2025-03-19

## 2025-03-19 RX ADMIN — FLUTICASONE FUROATE AND VILANTEROL TRIFENATATE 1 PUFF: 200; 25 POWDER RESPIRATORY (INHALATION) at 09:37

## 2025-03-19 RX ADMIN — LOSARTAN POTASSIUM 25 MG: 25 TABLET, FILM COATED ORAL at 10:48

## 2025-03-19 RX ADMIN — POTASSIUM CHLORIDE 20 MEQ: 1500 TABLET, EXTENDED RELEASE ORAL at 10:48

## 2025-03-19 RX ADMIN — CLOPIDOGREL 75 MG: 75 TABLET ORAL at 09:37

## 2025-03-19 RX ADMIN — POLYETHYLENE GLYCOL 3350 17 G: 17 POWDER, FOR SOLUTION ORAL at 10:48

## 2025-03-19 RX ADMIN — ASPIRIN 81 MG: 81 TABLET, COATED ORAL at 09:37

## 2025-03-19 ASSESSMENT — COGNITIVE AND FUNCTIONAL STATUS - GENERAL
DRESSING REGULAR LOWER BODY CLOTHING: A LITTLE
CLIMB 3 TO 5 STEPS WITH RAILING: A LITTLE
DAILY ACTIVITIY SCORE: 19
MOBILITY SCORE: 18
MOVING TO AND FROM BED TO CHAIR: A LITTLE
MOVING TO AND FROM BED TO CHAIR: A LITTLE
PERSONAL GROOMING: A LITTLE
TURNING FROM BACK TO SIDE WHILE IN FLAT BAD: A LITTLE
WALKING IN HOSPITAL ROOM: A LITTLE
CLIMB 3 TO 5 STEPS WITH RAILING: A LOT
STANDING UP FROM CHAIR USING ARMS: A LITTLE
TOILETING: A LITTLE
WALKING IN HOSPITAL ROOM: A LITTLE
EATING MEALS: A LITTLE
HELP NEEDED FOR BATHING: A LITTLE
MOBILITY SCORE: 21

## 2025-03-19 ASSESSMENT — PAIN - FUNCTIONAL ASSESSMENT
PAIN_FUNCTIONAL_ASSESSMENT: 0-10

## 2025-03-19 ASSESSMENT — PAIN SCALES - GENERAL
PAINLEVEL_OUTOF10: 0 - NO PAIN

## 2025-03-19 NOTE — CARE PLAN
The patient's goals for the shift include      The clinical goals for the shift include pt will remain free from falls

## 2025-03-19 NOTE — CARE PLAN
The patient's goals for the shift include      The clinical goals for the shift include pt will remain free from falls      Problem: General Stroke  Goal: Establish a mutual long term goal with patient by discharge  Outcome: Progressing  Goal: Demonstrate improvement in neurological exam throughout the shift  Outcome: Progressing  Goal: Maintain BP within ordered limits throughout shift  Outcome: Progressing  Goal: Participate in treatment (ie., meds, therapy) throughout shift  Outcome: Progressing  Goal: No symptoms of aspiration throughout shift  Outcome: Progressing  Goal: No symptoms of hemorrhage throughout shift  Outcome: Progressing  Goal: Tolerate enteral feeding throughout shift  Outcome: Progressing  Goal: Decreased nausea/vomiting throughout shift  Outcome: Progressing  Goal: Controlled blood glucose throughout shift  Outcome: Progressing  Goal: Out of bed three times today  Outcome: Progressing     Problem: Pain - Adult  Goal: Verbalizes/displays adequate comfort level or baseline comfort level  Outcome: Progressing     Problem: Safety - Adult  Goal: Free from fall injury  Outcome: Progressing     Problem: Discharge Planning  Goal: Discharge to home or other facility with appropriate resources  Outcome: Progressing     Problem: Chronic Conditions and Co-morbidities  Goal: Patient's chronic conditions and co-morbidity symptoms are monitored and maintained or improved  Outcome: Progressing     Problem: Nutrition  Goal: Nutrient intake appropriate for maintaining nutritional needs  Outcome: Progressing     Problem: Fall/Injury  Goal: Not fall by end of shift  Outcome: Progressing  Goal: Be free from injury by end of the shift  Outcome: Progressing  Goal: Verbalize understanding of personal risk factors for fall in the hospital  Outcome: Progressing  Goal: Verbalize understanding of risk factor reduction measures to prevent injury from fall in the home  Outcome: Progressing  Goal: Use assistive devices by  end of the shift  Outcome: Progressing  Goal: Pace activities to prevent fatigue by end of the shift  Outcome: Progressing

## 2025-03-19 NOTE — DISCHARGE SUMMARY
Discharge Diagnosis  Cerebrovascular accident (CVA), unspecified mechanism (Multi)    Issues Requiring Follow-Up      Discharge Meds     Medication List      START taking these medications     aspirin 81 mg EC tablet; Take 1 tablet (81 mg) by mouth once daily.;   Start taking on: March 20, 2025   atorvastatin 40 mg tablet; Commonly known as: Lipitor; Take 1 tablet (40   mg) by mouth once daily at bedtime.   clopidogrel 75 mg tablet; Commonly known as: Plavix; Take 1 tablet (75   mg) by mouth once daily.   polyethylene glycol 17 gram packet; Commonly known as: Glycolax,   Miralax; Take 17 g by mouth once daily.   sennosides-docusate sodium 8.6-50 mg tablet; Commonly known as:   Soledad-Colace; Take 1 tablet by mouth once daily at bedtime.     CONTINUE taking these medications     albuterol 90 mcg/actuation inhaler; Inhale 2 puffs every 6 hours if   needed for wheezing.   cholecalciferol 50 MCG (2000 UT) tablet; Commonly known as: Vitamin D-3   fluticasone propion-salmeteroL 250-50 mcg/dose diskus inhaler; Commonly   known as: Advair Diskus; Inhale 1 puff 2 times a day.       Test Results Pending At Discharge  Pending Labs       No current pending labs.            Hospital Course   Presented to ER 3/16 with dysarthria and ataxia for 2 days. CT head with no acute intracranial abnormalities noted. Patient unable to have MRI due to cochlear implant. Neurology consulted. Patient started on DAPT and statin. CT angio revealed carotid artery stenosis. Vascular surgery consulted and recommending close outpatient follow up with likely intervention, but recommend waiting 2 weeks prior to any intervention. Echo reveals positive bubble study. Cardiology consulted and feel echo results not related to current symptoms. Losartan added for blood pressure control. [facility]ed to [facility] at discharge.     Pertinent Physical Exam At Time of Discharge  Physical Exam  Constitutional:       Appearance: Normal appearance.   HENT:       Head: Normocephalic.      Mouth/Throat:      Mouth: Mucous membranes are moist.      Pharynx: Oropharynx is clear.   Eyes:      Extraocular Movements: Extraocular movements intact.      Conjunctiva/sclera: Conjunctivae normal.      Pupils: Pupils are equal, round, and reactive to light.   Cardiovascular:      Rate and Rhythm: Normal rate and regular rhythm.      Pulses: Normal pulses.      Heart sounds: Normal heart sounds.   Pulmonary:      Effort: Pulmonary effort is normal.      Breath sounds: Normal breath sounds.   Abdominal:      General: Bowel sounds are normal.      Palpations: Abdomen is soft.      Tenderness: There is no abdominal tenderness.   Musculoskeletal:         General: Normal range of motion.      Cervical back: Normal range of motion and neck supple.   Skin:     General: Skin is warm and dry.      Capillary Refill: Capillary refill takes less than 2 seconds.   Neurological:      Mental Status: He is alert and oriented to person, place, and time.      Cranial Nerves: Dysarthria and facial asymmetry present.   Psychiatric:         Mood and Affect: Mood normal.         Behavior: Behavior normal.         Outpatient Follow-Up  Future Appointments   Date Time Provider Department Center   6/2/2025 11:00 AM Giles Finch MD [id number]         Alexandra Ulrich APRN-CNP

## 2025-03-19 NOTE — PROGRESS NOTES
Speech-Language Pathology Clinical Swallow Treatment    Patient Name: Isaac Condon  MRN: [id number]  : 1955  Today's Date: 25  Start time: Start Time: 1135  Stop time: Stop Time: 1206  Time calculation (min) : Time Calculation (min): 31 min    ASSESSMENT  SLP TX Intervention Outcome: Making Progress Towards Goals     Treatment Tolerance: Patient tolerated treatment well  Prognosis: Good     IMPRESSIONS: Pt continues to demonstrate moderate dysarthria, but improving with over articulation speech.   Continues to demonstrate moderate oral phase dysphagia d/t being edentulous with weak lingual and jaw strength.      Continue a modified diet of Soft bite-sized diet and THIN Liquids.     Pt would benefit from skilled ST to minimize aspiration risk and ensure ongoing safety with the least restrictive diet as well as improve speech intelligibility through over articulation strategies and oral motor exercises against resistance.        PLAN  Recommended solid consistency: Soft/bite-sized (IDDSI level 6)  Recommended liquid consistency: Thin (IDDSI 0)  Recommended medication administration: Whole in thin liquid, Per pt preference, Per nursing discretion  Safe swallow strategies:   - Upright positioning for all PO intake  - Remain upright for >30 min after meals  - Slow rate of intake  - Small bites  - One bite/sip at a time  - Alternate bites and sips  - Effortful swallow  - Chew thoroughly  - Supervision recommended during meals  - Do not feed unless pt is fully alert and upright  - Lingual sweep to clear oral residue  - Check for pocketing during/after meals  - Sips via straw are ok    Discharge recommendation: Recommend HIGH intensity ST upon DC in order to ensure safety with least restrictive diet.  Inpatient/Swing Bed or Outpatient: Inpatient  SLP TX Plan: Continue Plan of Care  SLP Plan: Skilled SLP  SLP Frequency: 2x per week  Duration: 2 weeks  Next Treatment Priority: reassess swallow function in order  to determine if safe to continue modified diet of soft bite-sized foods and thin liquids. Dysarthria exercises for lips and tongue - over articulation exercises  Discussed POC: Patient, Nursing, Physician  Patient/Caregiver Agreeable: Yes      Goals (start date 03/17/2025 for two weeks END 03/31/2025):  - Pt will consume prescribed diet (current diet is Soft bite-sized diet and THIN Liquids) without overt s/sx aspiration/penetration in 95% of observed trials.              Status: Progressing              Progress this date: Pt ate slightly over 50% of eggs during a late breakfast - per pts daughter.  He did well.  Pt refused lunch tray as it came up about 30min after he finished his breakfast.     - Pt will demonstrate follow-through of trained compensatory strategies during a meal/snack with 90% acc independently.              Status: Ongoing              Progress this date: Pt not hungry - just finished a late breakfast.      - Pt will complete oral/pharyngeal/laryngeal strengthening exercises as directed given min verbal and visual cues from SLP in order to improve swallow function.              Status: Progressing              Progress this date: Pt completed over articulation exercises with improved intelligibility for short sentences and phrases 40/40.  Pt required min verbal cues on occasion to over articulate on 5 of the sentences/phrases.  Pts back was to his daughter and she would repeat the sentence or phrase after the pt in order to assess for intelligibility.  Pt only had to repeat 5/40 of the sentences.  Pt with improved lingual ROM and able to hold exercises for 5 seconds each.  Pt completed tongue pops 10/10 with improved loudness of pop with min verbal and visual cues to start the exercise.  Labial exercises (smile, open jaw) improved ROM.        - Pt/family will demonstrate understanding of education related to dysphagia independently.              Status: Progressing              Progress this  date: SLP has handouts of  exercises (sentences/phrases/multi-syllabic words) to practice over articulation and he is using them.  Family in agreement to work with pt.   Pt going to acute rehab today.        SUBJECTIVE  Prior to Session Communication: Bedside nurse  RN cleared pt to participate in session and reported improved speech intelligibility when using over articulation.   Respiratory status: Room air  Positioning: pt sitting at the edge of the bed.   Pt was alert, pleasant, and cooperative for session.  Pt noted he feels he is doing better.  Not having much of an appetite and possibly could benefit from a dietitian consult for supplements.     Pain Assessment: 0-10  0-10 (Numeric) Pain Score: 0 - No pain             ORIENTATION: Ox4    OBJECTIVE  Swallow reassessment:  Therapeutic Swallow  Therapeutic Swallow Intervention : Compensatory Strategies, Oral Strengthening Techniques  Solid Diet Recommendations: Soft & bite sized/chopped (IDDSI Level 6)  Liquid Diet Recommendations: Thin (IDDSI Level 0)  Swallow Comments: pt too full from breakfast to eat lunch.    Oral motor exercises - see goals section for details.     Over articulation exercises - see goals section for details.      Treatment/Education:  Results and recommendations were relayed to: Patient, Family, Bedside nurse, and Physician  Education provided: Yes   Learner: Patient, Significant other, Family, and Child   Barriers to learning: None and Acuteness of illness barrier   Method of teaching: Verbal, Written, and Demonstration   Topic: role of ST, results of assessment, risk for aspiration, recommended diet modifications, recommended safe swallow strategies, and recommendation for dysphagia follow-up, dysarthria exercises - handouts, lingual and labial exercises.   Outcome of teaching: Pt/family demonstrated good understanding and teachback/return demonstration

## 2025-03-19 NOTE — PROGRESS NOTES
Updated pt's wife Lisa in regards to accepting facility - [facility]. Provided IMM. Answered all questions and concerns. MD aware. No auth needed. TCC following.     1143: Discharge transport confirmed for 1230 pm via [facility]. Transport time and report # sent to nursing via Secure Chat.  Ambulance form provided. Wife updated on transport.

## 2025-03-19 NOTE — PROGRESS NOTES
Erica HAND placed holter monitor on pt on 03/19/25     Pt was explained on what they can and cannot do while wearing the monitor for 14 days.    Pt verbalized understanding.

## 2025-03-19 NOTE — PROGRESS NOTES
Occupational Therapy    OT Treatment    Patient Name: Isaac Condon  MRN: [id number]  Department: [facility]  Room: [address] [place] [address]  Today's Date: 3/19/2025  Time Calculation  Start Time: 1410  Stop Time: 1439  Time Calculation (min): 29 min        Assessment:  Barriers to Discharge Home: Physical needs  End of Session Communication: Bedside nurse  End of Session Patient Position: Up in room (with ST.)     Plan:  Treatment Interventions: ADL retraining, UE strengthening/ROM, Functional transfer training, Endurance training, Patient/family training, Cognitive reorientation, Equipment evaluation/education, Neuromuscular reeducation, Fine motor coordination activities, Compensatory technique education      Subjective   Previous Visit Info:  OT Last Visit  OT Received On: 03/18/25  General:  General  Prior to Session Communication: Bedside nurse  Patient Position Received: Alarm on, Up in chair  General Comment: pt. up in chair on arrival with family. He was agreeable to treatment. Noted patient progressing with transfers, UE ex. and sink level grooming task MIN A. Pt. is alert and talkative today.  Precautions:        Date/Time Vitals Session Patient Position Pulse Resp SpO2 BP MAP (mmHg)    03/19/25 1200 --  --  --  --  96 %  --  --                 Pain:  Pain Assessment  Pain Assessment: 0-10  0-10 (Numeric) Pain Score: 0 - No pain    Objective    Cognition:  Cognition  Overall Cognitive Status: Within Functional Limits  Coordination:     Activities of Daily Living: Grooming  Grooming Level of Assistance: Minimum assistance  Grooming Where Assessed: Standing sinkside  Grooming Comments: hand hygiene d          Bed Mobility/Transfers:      Transfers  Transfer: Yes  Transfer 1  Transfer From 1: Chair with arms to  Transfer to 1: Toilet  Technique 1: Sit to stand, Stand to sit (grab bar)  Transfer Level of Assistance 1: Minimum assistance  Trials/Comments 1: cues for use of grab bar and tech.  Transfers 2  Transfer From 2:  Chair with arms to  Transfer to 2: Bed  Technique 2: Sit to stand, Stand to sit  Transfer Level of Assistance 2: Minimum assistance  Trials/Comments 2: slightly unsteady with turn  to get toward bed.      Dynamic Standing Balance  Dynamic Standing-Level of Assistance: Minimum assistance  Dynamic Standing-Balance: Reaching across midline, Forward lean, Lateral lean, Turning       Therapy/Activity: Therapeutic Exercise  Therapeutic Exercise Performed: Yes  Therapeutic Exercise Activity 1: pt. completed BUE ex. shoulder flexion/ext, abduction/adduction, chest press, in standing 1x10 reps no loss of balance noted LUE progressing pt. flexing LUE past shoulder level with out c/os pain  Therapeutic Exercise Activity 2: pt. performed picking up small objects,  bilateral hand task transfering water from R to L hand wihtout spilling 10 reps. good control. cued to use wrist and supination on LUE            Outcome Measures:[facility] Daily Activity  Putting on and taking off regular lower body clothing: A little  Bathing (including washing, rinsing, drying): A lot  Putting on and taking off regular upper body clothing: A lot  Toileting, which includes using toilet, bedpan or urinal: A lot  Taking care of personal grooming such as brushing teeth: A little  Eating Meals: A little  Daily Activity - Total Score: 15        Education Documentation  Home Exercise Program, taught by BASIM Sears at 3/18/2025  3:19 PM.  Learner: Patient  Readiness: Acceptance  Method: Explanation  Response: Verbalizes Understanding, Needs Reinforcement    ADL Training, taught by BASIM Sears at 3/18/2025  3:19 PM.  Learner: Patient  Readiness: Acceptance  Method: Explanation  Response: Verbalizes Understanding, Needs Reinforcement    Education Comments  No comments found.        OP EDUCATION:       Goals:  Encounter Problems       Encounter Problems (Active)       ADLs       Patient with complete lower body dressing with modified  independent level of assistance donning and doffing all LE clothes  with PRN adaptive equipment while supported sitting and standing (Progressing)       Start:  03/17/25    Expected End:  03/31/25               EXERCISE/STRENGTHENING       Patient with increase LUE strength and coordination. (Progressing)       Start:  03/17/25    Expected End:  03/31/25               MOBILITY       Patient will perform Functional mobility max Household distances/Community Distances with modified independent level of assistance and least restrictive device in order to improve safety and functional mobility. (Not Progressing)       Start:  03/17/25    Expected End:  03/31/25               TRANSFERS       Patient will complete functional transfers with least restrictive device with modified independent level of assistance. (Not Progressing)       Start:  03/17/25    Expected End:  03/31/25

## 2025-03-19 NOTE — PROGRESS NOTES
Physical Therapy    Physical Therapy Treatment    Patient Name: Isaac Condon  MRN: [id number]  Department: [address]  Room: [address] [place] [address]  Today's Date: 3/19/2025  Time Calculation  Start Time: 0920  Stop Time: 0958  Time Calculation (min): 38 min         Assessment/Plan   PT Assessment  End of Session Communication: Bedside nurse  Assessment Comment: patient increased gait to  400feet, with focus on arm swing and  improved posture.  improved  response times today. voice is louder.  End of Session Patient Position: Up in chair, Alarm on  PT Plan  Inpatient/Swing Bed or Outpatient: Inpatient  PT Plan  Treatment/Interventions: Transfer training, Gait training, Stair training, Balance training, Strengthening, Endurance training  PT Plan: Ongoing PT  PT Frequency: 5 times per week  PT Discharge Recommendations: High intensity level of continued care  Equipment Recommended upon Discharge: Wheeled walker, Straight cane (TBD)  PT Recommended Transfer Status: Assist x1  PT - OK to Discharge: Yes (WHEN MEDICALLY CLEARED)      General Visit Information:   PT  Visit  PT Received On: 03/19/25  Response to Previous Treatment: Patient reporting fatigue but able to participate.  General  Prior to Session Communication: Bedside nurse  Patient Position Received: Bed, 3 rail up, Alarm on  General Comment: patient  in bed eager for therapy.     Subjective   Precautions:        Date/Time Vitals Session Patient Position Pulse Resp SpO2 BP MAP (mmHg)    03/19/25 1004 --  --  90  18  98 %  145/94  111                 Objective   Pain:  Pain Assessment  Pain Assessment: 0-10  0-10 (Numeric) Pain Score: 0 - No pain (no complaints of pain)  Cognition:  Cognition  Overall Cognitive Status: Within Functional Limits    Activity Tolerance:  Activity Tolerance  Endurance: Tolerates 30 min exercise with multiple rests  Treatments:  patient performed KAILEE LE ankle pumps, glute sets, hip abduction, SAQ  heel slides 20 reps each, bridging 5 reps  x 10  seconds each. sit to stand with min assist . static standing x 3 mins cGa. gait training  without device with min assist to perform left arm swing 400 feet with dual tasking , 3/3  patient able to christina socks with increased time .    Outcome Measures:  [facility] Basic Mobility  Turning from your back to your side while in a flat bed without using bedrails: None  Moving from lying on your back to sitting on the side of a flat bed without using bedrails: A little  Moving to and from bed to chair (including a wheelchair): A little  Standing up from a chair using your arms (e.g. wheelchair or bedside chair): A little  To walk in hospital room: A little  Climbing 3-5 steps with railing: A lot  Basic Mobility - Total Score: 18    Education Documentation  Mobility Training, taught by Vicki Espinoza PTA at 3/19/2025 11:02 AM.  Learner: Patient  Readiness: Acceptance  Method: Explanation  Response: Verbalizes Understanding    Education Comments  No comments found.        OP EDUCATION:       Encounter Problems       Encounter Problems (Active)       Mobility       STG - Patient will ambulate (Progressing)       Start:  03/17/25    Expected End:  03/24/25       150 FT PACING ACTIVITY FOR BEST LLE CONTROL, SBA, FWW/CANE FOR SAFETY PRN         STG - Patient will ambulate up and down a curb/step (Progressing)       Start:  03/17/25    Expected End:  03/22/25       FWW/CANE PRN FOR SAFETY, CGA         Goal 1 (Progressing)       Start:  03/17/25    Expected End:  04/07/25       20 REPS RROM INCREASING STRENGTH/COORDINATION &  ASSISTED BALANCE ACTIVITIES TO IMPROVE GAIT STABILITY            PT Transfers       STG - Patient will transfer sit to and from stand (Progressing)       Start:  03/17/25    Expected End:  03/20/25       SBA USING PROPER/SAFE TECHNIQUE              Pain - Adult

## 2025-03-19 NOTE — TELEPHONE ENCOUNTER
Called and spoke to [facility] charge nurse after applying patients Holter monitor about patients Holter monitor. Nurse expressed understanding on what patient could and could not do while wearing the Holter. She also stated that their facility could mail the monitor out after the prescribed wear time is up.   Erica Borges MA

## 2025-03-31 ENCOUNTER — PATIENT OUTREACH (OUTPATIENT)
Dept: PRIMARY CARE | Facility: CLINIC | Age: 70
End: 2025-03-31
Payer: MEDICARE

## 2025-03-31 NOTE — PROGRESS NOTES
Discharge Facility:[facility]  Discharge Diagnosis:CVA   Admission Date:3/16/25  Discharge Date: 3/29/25    PCP Appointment Date:4/9/25  Specialist Appointment Date:   Hospital Encounter and Summary Linked: YesHospital Encounter   See discharge assessment below for further details  Wrap Up  Wrap Up Additional Comments: discused discharge spoke to his wife she stated that he is improving well. provided contact information encouraged call with questions. (3/31/2025 10:07 AM)    Engagement  Call Start Time: 1007 (3/31/2025 10:07 AM)    Medications  Medications reviewed with patient/caregiver?: Yes (plavix 75ng losartin 25mg tylemol melatin multi vit thyamine 100mg lipitor 40mg) (3/31/2025 10:07 AM)  Is the patient having any side effects they believe may be caused by any medication additions or changes?: No (3/31/2025 10:07 AM)  Does the patient have all medications ordered at discharge?: Yes (spokje to his wife) (3/31/2025 10:07 AM)  Is the patient taking all medications as directed (includes completed medication regime)?: Yes (3/31/2025 10:07 AM)    Appointments  Does the patient have a primary care provider?: Yes (3/31/2025 10:07 AM)  Care Management Interventions: Verified appointment date/time/provider (3/31/2025 10:07 AM)    Self Management  What is the home health agency?: N/A (3/31/2025 10:07 AM)  Has home health visited the patient within 72 hours of discharge?: Not applicable (3/31/2025 10:07 AM)  What Durable Medical Equipment (DME) was ordered?: N/A (3/31/2025 10:07 AM)  Has all Durable Medical Equipment (DME) been delivered?: No (3/31/2025 10:07 AM)    Patient Teaching  Does the patient have access to their discharge instructions?: Yes (3/31/2025 10:07 AM)  Care Management Interventions: Reviewed instructions with patient (spoke to his wife) (3/31/2025 10:07 AM)  What is the patient's perception of their health status since discharge?: Improving (3/31/2025 10:07 AM)  Is the patient/caregiver able to teach  back the hierarchy of who to call/visit for symptoms/problems? PCP, Specialist, Home Health nurse, Urgent Care, ED, 911: Yes (spoke to his wife) (3/31/2025 10:07 AM)

## 2025-04-03 ENCOUNTER — OFFICE VISIT (OUTPATIENT)
Dept: VASCULAR SURGERY | Facility: HOSPITAL | Age: 70
End: 2025-04-03
Payer: MEDICARE

## 2025-04-03 VITALS
SYSTOLIC BLOOD PRESSURE: 124 MMHG | BODY MASS INDEX: 20.07 KG/M2 | DIASTOLIC BLOOD PRESSURE: 78 MMHG | HEART RATE: 75 BPM | WEIGHT: 148 LBS

## 2025-04-03 DIAGNOSIS — I65.21 CAROTID STENOSIS, RIGHT: Primary | ICD-10-CM

## 2025-04-03 PROCEDURE — 4004F PT TOBACCO SCREEN RCVD TLK: CPT | Performed by: SURGERY

## 2025-04-03 PROCEDURE — 99213 OFFICE O/P EST LOW 20 MIN: CPT | Performed by: SURGERY

## 2025-04-03 PROCEDURE — 1123F ACP DISCUSS/DSCN MKR DOCD: CPT | Performed by: SURGERY

## 2025-04-03 PROCEDURE — 1159F MED LIST DOCD IN RCRD: CPT | Performed by: SURGERY

## 2025-04-03 PROCEDURE — 1111F DSCHRG MED/CURRENT MED MERGE: CPT | Performed by: SURGERY

## 2025-04-03 NOTE — PROGRESS NOTES
Subjective   Patient ID: Isaac Condon is a 70 y.o. male who presents for New Patient Visit (NPV- carotid stenosis ).  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  At this time he is just completed his inpatient rehab and doing well  No new symptoms noted  Overall stable  Family member present    Review of Systems  Review of Systems    Constitutional:  no generalized malaise overall feels well, energy levels intact, no complaints specifically noted  HEENT:  No blurry vision, no visual aides noted, no hearing loss no ear ache no nose bleeds noted, no dysphagia, no congestion otherwise no pertinent positives noted  Cardiovascular:  no palpitations, chest pain or heaviness noted, no leg swelling, no numbness or tingling in the lower extremity noted  Respiratory:  no shortness of breath, no productive cough noted, no conversation dyspnea or difficulty breathing  Gastrointestinal:  no abdominal pain, no nausea or vomiting, appetite intact, no bowel irregularities noted  Genitourinary:   no urinary incontinence, frequency or urgency issues noted, no hematuria or burning sensation issues  Musculoskeletal:  No muscle aches or pains, no joint discomfort noted, no back pain noted otherwise feels well  Skin: no ulcerations, skin color issues or wounds upper or lower extremities  Neurologic: no dizziness, no hemiplegia, no hemiparesis, no obvious visual deficits noted  Psychiatric: no depression, no memory loss noted, no suicidal ideation  Endocrine: no weight loss or gain, no temperature concerns hot or cold intolerance  Hemogolotic/Lymphatic: no bruising, excessive bleeding, no swelling in the groins or neck noted    Objective   Physical Exam  Physical exam    Constitutional: alert and in no acute distress verbal  Eyes: No erythema swelling or discharge noted  Neck: supple, symmetric, trachea midline, no masses noted  Cardiovascular: Carotid pulses 2+, no obvious  bruit, no Jugular distension noted, no thrill, heart regular rate, lower extremity vascular exam intact, cap refill <2 sec  Pulmonary:  Bilateral breath sounds intact, clear with rales rhonchi or wheeze  Abdomen: soft non tender, no pulsatile masses noted, no rebound rigidity or guarding noted  Skin: intact warm no abnormal turgor  Psychiatric: alert without any obvious cognitive issues, oriented to person, place, and time    Assessment/Plan   Symptomatic right carotid stenosis  TCAR again explained complication not limited to infection bleeding deep vein thrombosis arteriovenous injury heart attack stroke and death  Cardiac clearance ordered and pending  Will schedule accordingly           Rohit Crooks DO 04/03/25 3:19 PM

## 2025-04-07 ENCOUNTER — EVALUATION (OUTPATIENT)
Dept: PHYSICAL THERAPY | Facility: CLINIC | Age: 70
End: 2025-04-07
Payer: MEDICARE

## 2025-04-07 ENCOUNTER — EVALUATION (OUTPATIENT)
Dept: OCCUPATIONAL THERAPY | Facility: CLINIC | Age: 70
End: 2025-04-07
Payer: MEDICARE

## 2025-04-07 DIAGNOSIS — I63.9 CEREBRAL INFARCTION, UNSPECIFIED MECHANISM (MULTI): ICD-10-CM

## 2025-04-07 DIAGNOSIS — I63.9 CEREBRAL INFARCTION, UNSPECIFIED: ICD-10-CM

## 2025-04-07 DIAGNOSIS — R27.8 DECREASED COORDINATION: Primary | ICD-10-CM

## 2025-04-07 DIAGNOSIS — R29.898 LEFT ARM WEAKNESS: ICD-10-CM

## 2025-04-07 DIAGNOSIS — I63.9 CEREBRAL INFARCTION, UNSPECIFIED: Primary | ICD-10-CM

## 2025-04-07 PROCEDURE — 97110 THERAPEUTIC EXERCISES: CPT | Mod: GO

## 2025-04-07 PROCEDURE — 97110 THERAPEUTIC EXERCISES: CPT | Mod: GP

## 2025-04-07 PROCEDURE — 97165 OT EVAL LOW COMPLEX 30 MIN: CPT | Mod: GO

## 2025-04-07 PROCEDURE — 97162 PT EVAL MOD COMPLEX 30 MIN: CPT | Mod: GP

## 2025-04-07 ASSESSMENT — ENCOUNTER SYMPTOMS
DEPRESSION: 0
LOSS OF SENSATION IN FEET: 0
OCCASIONAL FEELINGS OF UNSTEADINESS: 0
LOSS OF SENSATION IN FEET: 0
DEPRESSION: 0
OCCASIONAL FEELINGS OF UNSTEADINESS: 1

## 2025-04-07 ASSESSMENT — PAIN - FUNCTIONAL ASSESSMENT: PAIN_FUNCTIONAL_ASSESSMENT: 0-10

## 2025-04-07 ASSESSMENT — PATIENT HEALTH QUESTIONNAIRE - PHQ9
SUM OF ALL RESPONSES TO PHQ9 QUESTIONS 1 AND 2: 0
2. FEELING DOWN, DEPRESSED OR HOPELESS: NOT AT ALL
1. LITTLE INTEREST OR PLEASURE IN DOING THINGS: NOT AT ALL

## 2025-04-07 ASSESSMENT — PAIN SCALES - GENERAL: PAINLEVEL_OUTOF10: 0 - NO PAIN

## 2025-04-07 NOTE — LETTER
2025    Eneida Tenorio MD  [address] [facility] [place] [address]    Patient: Isaac Condon   YOB: 1955   Date of Visit: 2025       Dear Eneida Tenorio MD  [address] [place] [address]    The attached plan of care is being sent to you because your patient’s medical reimbursement requires that you certify the plan of care. Your signature is required to allow uninterrupted insurance coverage.      You may indicate your approval by signing below and faxing this form back to us at Dept Fax: [phone].    Please call Dept: [phone] with any questions or concerns.    Thank you for this referral,        Leonides Travis, PT  POR [address]  [address] [address]    Payer: Payor: MEDICARE / Plan: MEDICARE PART A AND B / Product Type: *No Product type* /                                                                         Date:     Dear Leonides Travis, PT,     Re: Mr. Isaac Condon, MRN:[id number]    I certify that I have reviewed the attached plan of care and it is medically necessary for Mr. Isaac Condon (1955) who is under my care.          ______________________________________                    _________________  Provider name and credentials                                           Date and time                                                                                           Plan of Care 25   Effective from: 2025  Effective to: 2025    Plan ID: [id number]            Participants as of Finalize on 2025    Name Type Comments Contact Info    Eneida Tenorio MD Consulting Physician  [phone]       Last Plan Note     Author: Leonides Travis, PT Status: Addendum Last edited: 2025  2:00 PM       Physical Therapy    Physical Therapy Evaluation and Treatment      Patient Name: Isaac Condon  MRN: [id number]  Today's Date: 2025  : 1955    Time Entry:   Time  Calculation  Start Time: 1400  Stop Time: 1442  Time Calculation (min): 42 min  PT Evaluation Time Entry  PT Evaluation (Moderate) Time Entry: 30  PT Therapeutic Procedures Time Entry  Therapeutic Exercise Time Entry: 12    Visit # 1                     Assessment:  PT Assessment  PT Assessment Results: Decreased strength, Decreased mobility, Impaired balance, Impaired hearing  Rehab Prognosis: Good   Patient is a 70 year old male post CVA presenting to PT with L LE weakness, core weakness and poor balance.  He will benefit from skilled intervention to address above deficits and progress toward meeting goals.    Plan:  OP PT Plan  Treatment/Interventions: Cryotherapy, Education/ Instruction, Electrical stimulation, Gait training, Hot pack, Manual therapy, Taping techniques, Therapeutic exercises  PT Plan: Skilled PT  PT Frequency: 2 times per week  Number of Treatments Authorized: Medicare guidelines  Rehab Potential: Good  Plan of Care Agreement: Patient    Current Problem:   1. Cerebral infarction, unspecified  Referral to Physical Therapy    Follow Up In Physical Therapy      Name &  confirmed by patient.    Subjective    General:  General  Reason for Referral: CVA  Referred By: Coby LOPEZ  Patient suffered a CVA 3/16/25 and spent 4 days in the hospital.  Patient was transferred to [facility] and was discharged after 10 days.  Patient denies experiencing pain.  Follow up with Dr. Finch Wednesday.  The patient's goal is to improve standing balance.        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     Pain:  Pain Assessment  Pain Assessment: 0-10  0-10 (Numeric) Pain Score: 0 - No pain     Prior Level of Function:  Prior Function Per Pt/Caregiver Report  Level of [place]: Independent with ADLs and functional transfers    Objective   R LE strength  Hip Flexion = 4+/5  Quads  = 4+/5  HS = 4+/5    L LE strength  Hip Flexion = 4/5  Quads = 4/5  HS = 4+/5    Sit to stand without UE support = unable    L SLS EO balance = 4 seconds    Core strength = 4-/5    TUG = 13 seconds    Outcome Measures:  Other Measures  Activities - Specific Balance Confidence Scale: 54.375     Treatments:  Nustep L5 10'  Sit to stands - x 15    EDUCATION:   Sit to stands at home    Goals: (By week 6)  1) Patient will complete TUG <11 seconds to decrease fall risk    2) Increase KAILEE LE strength to 5/5 to ease capability to ascend/descend steps and perform safe transfers    3) Increase core strength to >4/5 to improve posture with standing/lifting activities    4) Increase L SLS balance to >10 seconds for improving proprioceptive abilities with daily activities               Current Participants as of 4/7/2025    Name Type Comments Contact Info    Eneida Tenorio MD Consulting Physician  [phone]    Signature pending

## 2025-04-07 NOTE — PROGRESS NOTES
"Occupational Therapy    Evaluation/Treatment    Patient Name: Isaac Condon  MRN: [id number]  : 1955  Today's Date: 2025     Time Calculation  Start Time: 1457  Stop Time: 1545  Time Calculation (min): 48 min  Visit Number: 1  Therapeutic Procedure Codes:  OT Evaluation Time Entry  OT Evaluation (Low) Time Entry: 15   OT Therapeutic Procedures Time Entry  Therapeutic Exercise Time Entry: 18                   Non-Billable Time  Non-billable time: 15  Non-billable time reason: medicare     Subjective   Current Problem:  1. Decreased coordination        2. Cerebral infarction, unspecified  Referral to Occupational Therapy    Follow Up In Occupational Therapy      3. Left arm weakness        4. Cerebral infarction, unspecified mechanism (Multi) [I63.9]          General:  Pt presents with LUE weakness, decreased mobility and coordination d/t R CVA affecting L side. Deficits affect pt's ability to carry out I/ADLs. Pt states that on 3/14/25 he began having difficulty speaking. After symptoms did not resolve, pt's wife took him to the ER (3/16/25). Per chart review, \"Presented to ER 3/16 with dysarthria and ataxia for 2 days. CT head with no acute intracranial abnormalities noted. Patient unable to have MRI due to cochlear implant. Neurology consulted. Patient started on DAPT and statin. CT angio revealed carotid artery stenosis. Vascular surgery consulted and recommending close outpatient follow up with likely intervention, but recommend waiting 2 weeks prior to any intervention... Cardiology consulted and feel echo results not related to current symptoms.\"    Pt in hospital for 4 days until discharged to Acute Rehab at [facility]. Pt completed skilled OT, PT, and ST. Pt discharged home after 10 days and referred to interdisciplinary outpatient services.     Pt reports having a TIA in 2024. Pt is currently completing UE strengthening with 3 lb weight for shoulder.     Pt arrived to appointment 12 " minutes late.   OT Received On: 04/07/25  General  Reason for Referral: CVA  Referred By: Eneida Tenorio MD    Hand Dominance: R handed/L side affected. Writes with his L hand, completes all other I/ADLs with R.     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   Pain:   0    Objective       Home Living: [facility] 1 [address]A, no handrails. Is living on 1st floor currently, however pt states that he goes up the stairs occasionally. Has a shower chair, no GBs      Prior Function: IND; retired        ADL: Pt has difficulty with bimanual tasks (opening jars, buttoning). He is completing ADLs MOD I. Wife drives him to appointments. Pt completes most tasks with RUE.      Activity Tolerance: WNL      Vision: Pt states that his peripheral vision is affected on L side, otherwise WNL    Sensation: WNL      Strength: Refer to chart below     LUE AROM: grossly WNL      Coordination: impaired, see results below        Outcome Measures:   Quickdash Scores: 13.64%    Therapy/Activity:   Onset: 3/14/25        Exercises: Reps:   AAROM - dowel tammy Shoulder flexion, abduction     1x3, 5 second holds                Activities:    Bingo chips - FMC In hand manipulation x9    Stacked x9 chips     *Completed with additional time and frequent drops     Added to HEP   Card flip With RUE while WB on LUE x10        HEP provided on HEP provided to pt including shoulder AAROM, weight bearing, and FMC activities. Pt instructed to complete 2-3x a day, 3x10 reps within pain free range. Handouts provided to pt.    Modalities:                    Manual:                    Functional review:     Completed on: 4/7/25 OT Evaluation     Manual Muscle Testing: Shoulder    RIGHT LEFT   Flexion 4/5 3+/5   Extension 3+/5 4/5   External rotation 3+/5  3+/5   Internal rotation  3+/5 3+/5     MMT:    5/5 R   3+/5 L     Box and Blocks  Right 37   Left 26      OP EDUCATION:  Education  Individual(s) Educated: Patient  Education Provided: Anatomy & Physiology, Diagnosis & Precautions, Risk and benefits of OT discussed with patient or other, POC discussed and agreed upon, Fall precautons  Home Program: AROM, AAROM, Activity modification, Strengthening, Handout issued  Risk and Benefits Discussed with Patient/Caregiver/Other: yes  Patient/Caregiver Demonstrated Understanding: yes  Plan of Care Discussed and Agreed Upon: yes  Patient Response to Education: Patient/Caregiver Performed Return Demonstration of Exercises/Activities, Patient/Caregiver Verbalized Understanding of Information, Patient/Caregiver Asked Appropriate Questions    Assessment:   Pt is a 70 year old male who presents to this facility with performance deficits in LUE strength and coordination limiting ability to complete ADL and IADL tasks efficiently. Pt  provided with HEP including shoulder AAROM, coordination activities and weightbearing on affected side. Handouts provided to pt. Pt demonstrated understanding. Instructed pt to continue UE strengthening provided from previous therapies, will review at next session.     OT Assessment Results: Decreased ADL status, Decreased upper extremity range of motion, Decreased upper extremity strength, Visual deficit, Decreased functional mobility, Decreased IADLs  Plan:  Frequency: 2x a week  Duration: 12x weeks  Occupational therapy intervention plan to include education/instruction, manual therapy, neuromuscular re-education, orthotic fitting/training, self-care/home management, therapeutic exercises, therapeutic activities, and home program.      Goals:  Active       OT Goals       OT Goal 1       Start:  04/07/25    Expected End:  07/07/25       LTG - Patient will indicate/ demonstrate the ability to resume all preinjury ADLs and IADLs without  significant limits secondary to decreased ROM, decreased strength and/or pain as indicated by Quickdash score of less than 5%.          OT Goal 2       Start:  04/07/25    Expected End:  07/07/25       Develop and issue HEP to help maximize ROM, strength and tolerance to help maximize return to all pre-onset activities.          OT Goal 3       Start:  04/07/25    Expected End:  07/07/25       Pt will demonstrate increased LUE strength as appropriate with the LUE to be greater than or equal to 4/5 on MMT scale to help patient resume ADLs and IADLs.          OT Goal 4       Start:  04/07/25    Expected End:  07/07/25       Pt will demonstrate increased FMC in L hand as indicated by increased box-and-blocks score 2-5 blocks as compared to R hand.

## 2025-04-07 NOTE — LETTER
2025    Eneida Tenorio MD  [address] [facility] [place] [address]    Patient: Isaac Condon   YOB: 1955   Date of Visit: 2025       Dear Eneida Tenorio MD  [address]  [facility] [place] [address]    The attached plan of care is being sent to you because your patient’s medical reimbursement requires that you certify the plan of care. Your signature is required to allow uninterrupted insurance coverage.      You may indicate your approval by signing below and faxing this form back to us at Dept Fax: [phone].    Please call Dept: [phone] with any questions or concerns.    Thank you for this referral,        Meryl Hoover OT  POR [address] [facility] [address]  [facility]  [address] [address]    Payer: Payor: MEDICARE / Plan: MEDICARE PART A AND B / Product Type: *No Product type* /                                                                         Date:     Dear Meryl Hoover OT,     Re: Mr. Isaac Condon, MRN:[id number]    I certify that I have reviewed the attached plan of care and it is medically necessary for Mr. Isaac Condon (1955) who is under my care.          ______________________________________                    _________________  Provider name and credentials                                           Date and time                                                                                           Plan of Care 25   Effective from: 2025  Effective to: 2025    Plan ID: [id number]            Participants as of Finalize on 2025    Name Type Comments Contact Info    Eneida Tenorio MD Referring Provider  [phone]    Meryl Hoover OT Occupational Therapist         Last Plan Note     Author: Meryl Hoover OT Status: Sign when Signing Visit Last edited: 2025  2:45 PM       Occupational Therapy    Evaluation/Treatment    Patient Name: Isaac Condon  MRN: [id number]  :  "1955  Today's Date: 4/7/2025     Time Calculation  Start Time: 1457  Stop Time: 1545  Time Calculation (min): 48 min  Visit Number: 1  Therapeutic Procedure Codes:  OT Evaluation Time Entry  OT Evaluation (Low) Time Entry: 15   OT Therapeutic Procedures Time Entry  Therapeutic Exercise Time Entry: 18                   Non-Billable Time  Non-billable time: 15  Non-billable time reason: medicare     Subjective   Current Problem:  1. Decreased coordination        2. Cerebral infarction, unspecified  Referral to Occupational Therapy    Follow Up In Occupational Therapy      3. Left arm weakness        4. Cerebral infarction, unspecified mechanism (Multi) [I63.9]          General:  Pt presents with LUE weakness, decreased mobility and coordination d/t R CVA affecting L side. Deficits affect pt's ability to carry out I/ADLs. Pt states that on 3/14/25 he began having difficulty speaking. After symptoms did not resolve, pt's wife took him to the ER (3/16/25). Per chart review, \"Presented to ER 3/16 with dysarthria and ataxia for 2 days. CT head with no acute intracranial abnormalities noted. Patient unable to have MRI due to cochlear implant. Neurology consulted. Patient started on DAPT and statin. CT angio revealed carotid artery stenosis. Vascular surgery consulted and recommending close outpatient follow up with likely intervention, but recommend waiting 2 weeks prior to any intervention... Cardiology consulted and feel echo results not related to current symptoms.\"    Pt in hospital for 4 days until discharged to Acute Rehab at [facility]. Pt completed skilled OT, PT, and ST. Pt discharged home after 10 days and referred to interdisciplinary outpatient services.     Pt reports having a TIA in December 2024. Pt is currently completing UE strengthening with 3 lb weight for shoulder.     Pt arrived to appointment 12 minutes late.   OT Received On: 04/07/25  General  Reason for Referral: CVA  Referred By: Eneida Tenorio, " MD    Hand Dominance: R handed/L side affected. Writes with his L hand, completes all other I/ADLs with R.     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   Pain:   0    Objective       Home Living: [facility] 1 [address], no handrails. Is living on 1st floor currently, however pt states that he goes up the stairs occasionally. Has a shower chair, no GBs      Prior Function: IND; retired        ADL: Pt has difficulty with bimanual tasks (opening jars, buttoning). He is completing ADLs MOD I. Wife drives him to appointments. Pt completes most tasks with RUE.      Activity Tolerance: WNL      Vision: Pt states that his peripheral vision is affected on L side, otherwise WNL    Sensation: WNL      Strength: Refer to chart below     LUE AROM: grossly WNL      Coordination: impaired, see results below        Outcome Measures:   Quickdash Scores: 13.64%    Therapy/Activity:   Onset: 3/14/25        Exercises: Reps:   AAROM - dowel tammy Shoulder flexion, abduction     1x3, 5 second holds                Activities:    Bingo chips - FMC In hand manipulation x9    Stacked x9 chips     *Completed with additional time and frequent drops     Added to HEP   Card flip With RUE while WB on LUE x10        HEP provided on HEP provided to pt including shoulder AAROM, weight bearing, and FMC activities. Pt instructed to complete 2-3x a day, 3x10 reps within pain free range. Handouts provided to pt.    Modalities:                    Manual:                    Functional review:     Completed on: 4/7/25 OT Evaluation     Manual Muscle Testing: Shoulder    RIGHT LEFT   Flexion 4/5 3+/5   Extension 3+/5 4/5   External rotation 3+/5 3+/5   Internal rotation  3+/5 3+/5     MMT:    5/5 R   3+/5 L     Box and Blocks  Right 37    Left 26      OP EDUCATION:  Education  Individual(s) Educated: Patient  Education Provided: Anatomy & Physiology, Diagnosis & Precautions, Risk and benefits of OT discussed with patient or other, POC discussed and agreed upon, Fall precautons  Home Program: AROM, AAROM, Activity modification, Strengthening, Handout issued  Risk and Benefits Discussed with Patient/Caregiver/Other: yes  Patient/Caregiver Demonstrated Understanding: yes  Plan of Care Discussed and Agreed Upon: yes  Patient Response to Education: Patient/Caregiver Performed Return Demonstration of Exercises/Activities, Patient/Caregiver Verbalized Understanding of Information, Patient/Caregiver Asked Appropriate Questions    Assessment:   Pt is a 70 year old male who presents to this facility with performance deficits in LUE strength and coordination limiting ability to complete ADL and IADL tasks efficiently. Pt  provided with HEP including shoulder AAROM, coordination activities and weightbearing on affected side. Handouts provided to pt. Pt demonstrated understanding. Instructed pt to continue UE strengthening provided from previous therapies, will review at next session.     OT Assessment Results: Decreased ADL status, Decreased upper extremity range of motion, Decreased upper extremity strength, Visual deficit, Decreased functional mobility, Decreased IADLs  Plan:  Frequency: 2x a week  Duration: 12x weeks  Occupational therapy intervention plan to include education/instruction, manual therapy, neuromuscular re-education, orthotic fitting/training, self-care/home management, therapeutic exercises, therapeutic activities, and home program.      Goals:  Active       OT Goals       OT Goal 1       Start:  04/07/25    Expected End:  07/07/25       LTG - Patient will indicate/ demonstrate the ability to resume all preinjury ADLs and IADLs without significant limits secondary to decreased ROM, decreased strength and/or pain as indicated by Quickdash  score of less than 5%.          OT Goal 2       Start:  04/07/25    Expected End:  07/07/25       Develop and issue HEP to help maximize ROM, strength and tolerance to help maximize return to all pre-onset activities.          OT Goal 3       Start:  04/07/25    Expected End:  07/07/25       Pt will demonstrate increased LUE strength as appropriate with the LUE to be greater than or equal to 4/5 on MMT scale to help patient resume ADLs and IADLs.          OT Goal 4       Start:  04/07/25    Expected End:  07/07/25       Pt will demonstrate increased FMC in L hand as indicated by increased box-and-blocks score 2-5 blocks as compared to R hand.                 Current Participants as of 4/7/2025    Name Type Comments Contact Info    Eneida Tenorio MD Referring Provider  [phone]    Signature pending    Meryl Hoover OT Occupational Therapist      Signature pending

## 2025-04-07 NOTE — PROGRESS NOTES
Physical Therapy    Physical Therapy Evaluation and Treatment      Patient Name: Isaac Condon  MRN: [id number]  Today's Date: 2025  : 1955    Time Entry:   Time Calculation  Start Time: 1400  Stop Time: 1442  Time Calculation (min): 42 min  PT Evaluation Time Entry  PT Evaluation (Moderate) Time Entry: 30  PT Therapeutic Procedures Time Entry  Therapeutic Exercise Time Entry: 12    Visit # 1                     Assessment:  PT Assessment  PT Assessment Results: Decreased strength, Decreased mobility, Impaired balance, Impaired hearing  Rehab Prognosis: Good   Patient is a 70 year old male post CVA presenting to PT with L LE weakness, core weakness and poor balance.  He will benefit from skilled intervention to address above deficits and progress toward meeting goals.    Plan:  OP PT Plan  Treatment/Interventions: Cryotherapy, Education/ Instruction, Electrical stimulation, Gait training, Hot pack, Manual therapy, Taping techniques, Therapeutic exercises  PT Plan: Skilled PT  PT Frequency: 2 times per week  Number of Treatments Authorized: Medicare guidelines  Rehab Potential: Good  Plan of Care Agreement: Patient    Current Problem:   1. Cerebral infarction, unspecified  Referral to Physical Therapy    Follow Up In Physical Therapy      Name &  confirmed by patient.    Subjective    General:  General  Reason for Referral: CVA  Referred By: Coby LOPEZ  Patient suffered a CVA 3/16/25 and spent 4 days in the hospital.  Patient was transferred to [facility] and was discharged after 10 days.  Patient denies experiencing pain.  Follow up with Dr. Finch Wednesday.  The patient's goal is to improve standing balance.        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     Pain:  Pain Assessment  Pain Assessment: 0-10  0-10 (Numeric) Pain Score: 0 - No pain     Prior Level of  Function:  Prior Function Per Pt/Caregiver Report  Level of [place]: Independent with ADLs and functional transfers    Objective   R LE strength  Hip Flexion = 4+/5  Quads = 4+/5  HS = 4+/5    L LE strength  Hip Flexion = 4/5  Quads = 4/5  HS = 4+/5    Sit to stand without UE support = unable    L SLS EO balance = 4 seconds    Core strength = 4-/5    TUG = 13 seconds    Outcome Measures:  Other Measures  Activities - Specific Balance Confidence Scale: 54.375     Treatments:  Nustep L5 10'  Sit to stands - x 15    EDUCATION:   Sit to stands at home    Goals: (By week 6)  1) Patient will complete TUG <11 seconds to decrease fall risk    2) Increase KAILEE LE strength to 5/5 to ease capability to ascend/descend steps and perform safe transfers    3) Increase core strength to >4/5 to improve posture with standing/lifting activities    4) Increase L SLS balance to >10 seconds for improving proprioceptive abilities with daily activities

## 2025-04-09 ENCOUNTER — APPOINTMENT (OUTPATIENT)
Dept: PRIMARY CARE | Facility: CLINIC | Age: 70
End: 2025-04-09
Payer: MEDICARE

## 2025-04-09 VITALS
WEIGHT: 150.7 LBS | BODY MASS INDEX: 20.44 KG/M2 | SYSTOLIC BLOOD PRESSURE: 137 MMHG | RESPIRATION RATE: 14 BRPM | DIASTOLIC BLOOD PRESSURE: 85 MMHG | OXYGEN SATURATION: 96 % | TEMPERATURE: 96.5 F | HEART RATE: 72 BPM

## 2025-04-09 DIAGNOSIS — I77.9 BILATERAL CAROTID ARTERY DISEASE, UNSPECIFIED TYPE: ICD-10-CM

## 2025-04-09 DIAGNOSIS — E78.00 PURE HYPERCHOLESTEROLEMIA: ICD-10-CM

## 2025-04-09 DIAGNOSIS — I63.9 CEREBROVASCULAR ACCIDENT (CVA), UNSPECIFIED MECHANISM (MULTI): Primary | ICD-10-CM

## 2025-04-09 DIAGNOSIS — R29.898 LEFT ARM WEAKNESS: ICD-10-CM

## 2025-04-09 DIAGNOSIS — E55.9 VITAMIN D DEFICIENCY: ICD-10-CM

## 2025-04-09 PROBLEM — R55 VASOVAGAL SYNCOPE: Status: RESOLVED | Noted: 2025-01-28 | Resolved: 2025-04-09

## 2025-04-09 PROCEDURE — 1160F RVW MEDS BY RX/DR IN RCRD: CPT | Performed by: FAMILY MEDICINE

## 2025-04-09 PROCEDURE — 99495 TRANSJ CARE MGMT MOD F2F 14D: CPT | Performed by: FAMILY MEDICINE

## 2025-04-09 PROCEDURE — 1036F TOBACCO NON-USER: CPT | Performed by: FAMILY MEDICINE

## 2025-04-09 PROCEDURE — 1124F ACP DISCUSS-NO DSCNMKR DOCD: CPT | Performed by: FAMILY MEDICINE

## 2025-04-09 PROCEDURE — 1111F DSCHRG MED/CURRENT MED MERGE: CPT | Performed by: FAMILY MEDICINE

## 2025-04-09 PROCEDURE — 1159F MED LIST DOCD IN RCRD: CPT | Performed by: FAMILY MEDICINE

## 2025-04-09 RX ORDER — TALC
3 POWDER (GRAM) TOPICAL DAILY PRN
COMMUNITY
Start: 2025-03-28

## 2025-04-09 RX ORDER — LANOLIN ALCOHOL/MO/W.PET/CERES
100 CREAM (GRAM) TOPICAL
COMMUNITY
Start: 2025-03-29

## 2025-04-09 ASSESSMENT — ENCOUNTER SYMPTOMS
FEVER: 0
SHORTNESS OF BREATH: 0
SPEECH DIFFICULTY: 1
CHEST TIGHTNESS: 0
ARTHRALGIAS: 0
CHILLS: 0
WEAKNESS: 1
SEIZURES: 0
ABDOMINAL PAIN: 0
PALPITATIONS: 0
HEADACHES: 0
CONFUSION: 0

## 2025-04-09 NOTE — ASSESSMENT & PLAN NOTE
Status post CVA.  Patient continues with physical occupational therapy.  Still experiencing some left arm and leg weakness.  Along with facial droop.  No slurring of speech.    Keep follow-up with neurology along with other specialist and he will remain on aspirin and Plavix.

## 2025-04-09 NOTE — PROGRESS NOTES
Subjective   Patient ID: Isaac Condon is a 70 y.o. male who presents for Hospital Follow-up (Rehab for 10 days).    HPI patient in today with his wife for posthospital follow-up.  Patient suffered stroke resulting in left arm and leg weakness facial drooping and slurring of speech.  Symptomatically he is improving he is ambulating independently.  Currently in outpatient physical therapy and Occupational Therapy.  As part of the workup he was found to have approximately 70% occlusion of his right carotid bifurcation he is being evaluated by vascular surgery with plans of interventional procedure pending cardiac clearance.    Review of Systems   Constitutional:  Negative for chills and fever.   HENT:  Negative for congestion and ear pain.    Eyes:  Negative for visual disturbance.   Respiratory:  Negative for chest tightness and shortness of breath.    Cardiovascular:  Negative for chest pain and palpitations.   Gastrointestinal:  Negative for abdominal pain.   Musculoskeletal:  Negative for arthralgias.   Skin:  Negative for pallor.   Neurological:  Positive for speech difficulty and weakness. Negative for seizures and headaches.   Psychiatric/Behavioral:  Negative for confusion.        Objective   /85   Pulse 72   Temp 35.8 °C (96.5 °F)   Resp 14   Wt 68.4 kg (150 lb 11.2 oz)   SpO2 96%   BMI 20.44 kg/m²     Physical Exam  Vitals and nursing note reviewed.   Constitutional:       General: He is not in acute distress.     Appearance: Normal appearance. He is not ill-appearing.   HENT:      Head: Normocephalic and atraumatic.      Right Ear: Tympanic membrane, ear canal and external ear normal.      Left Ear: Tympanic membrane, ear canal and external ear normal.      Mouth/Throat:      Pharynx: Oropharynx is clear.   Eyes:      Extraocular Movements: Extraocular movements intact.   Cardiovascular:      Rate and Rhythm: Normal rate and regular rhythm.      Pulses: Normal pulses.      Heart sounds: Normal  heart sounds.   Pulmonary:      Effort: Pulmonary effort is normal.      Breath sounds: Normal breath sounds.   Abdominal:      General: Abdomen is flat. Bowel sounds are normal.      Palpations: Abdomen is soft.      Tenderness: There is no abdominal tenderness.   Musculoskeletal:         General: Normal range of motion.      Cervical back: Neck supple.   Skin:     General: Skin is warm.   Neurological:      Mental Status: He is alert.      Motor: Weakness present.      Comments: Slight weakness left upper and lower extremities compared to the right.  Slight slurring of speech but understandable.  Answers simple questions appropriately.   Psychiatric:         Mood and Affect: Mood normal.     Recent hospital reports consults reviewed with patient and wife.    Follow through with cardiology vascular surgery and neurology.    Continue physical and Occupational Therapy.    Continue aspirin and Plavix as directed.    Call if any questions or concerns return to office in approximately 8 weeks to reassess his case and review specialty input.  He is to have fasting lab work done prior to that appointment        Assessment/Plan   Problem List Items Addressed This Visit             ICD-10-CM    Vitamin D deficiency E55.9    Relevant Orders    Vitamin D 25-Hydroxy,Total (for eval of Vitamin D levels)    Cerebrovascular accident (CVA), unspecified mechanism (Multi) - Primary I63.9     Status post CVA.  Patient continues with physical occupational therapy.  Still experiencing some left arm and leg weakness.  Along with facial droop.  No slurring of speech.    Keep follow-up with neurology along with other specialist and he will remain on aspirin and Plavix.         Relevant Orders    CBC    Comprehensive Metabolic Panel    Left arm weakness R29.898     Continue physical therapy         Pure hypercholesterolemia E78.00     Continue atorvastatin 40 mg daily         Relevant Orders    Comprehensive Metabolic Panel    Lipid Panel     Bilateral carotid artery disease I77.9     CT angio of head and neck revealed approximately 70% stenosis right carotid bifurcation.  Patient evaluated by vascular surgery.  They are waiting cardiac clearance for interventional procedure on the carotid.

## 2025-04-09 NOTE — ASSESSMENT & PLAN NOTE
CT angio of head and neck revealed approximately 70% stenosis right carotid bifurcation.  Patient evaluated by vascular surgery.  They are waiting cardiac clearance for interventional procedure on the carotid.

## 2025-04-10 ENCOUNTER — PATIENT OUTREACH (OUTPATIENT)
Dept: PRIMARY CARE | Facility: CLINIC | Age: 70
End: 2025-04-10
Payer: MEDICARE

## 2025-04-10 ENCOUNTER — TREATMENT (OUTPATIENT)
Dept: PHYSICAL THERAPY | Facility: CLINIC | Age: 70
End: 2025-04-10
Payer: MEDICARE

## 2025-04-10 DIAGNOSIS — I63.9 CEREBRAL INFARCTION, UNSPECIFIED: ICD-10-CM

## 2025-04-10 PROCEDURE — 97110 THERAPEUTIC EXERCISES: CPT | Mod: GP,CQ | Performed by: PHYSICAL THERAPY ASSISTANT

## 2025-04-10 ASSESSMENT — PAIN - FUNCTIONAL ASSESSMENT: PAIN_FUNCTIONAL_ASSESSMENT: 0-10

## 2025-04-10 ASSESSMENT — PAIN SCALES - GENERAL: PAINLEVEL_OUTOF10: 0 - NO PAIN

## 2025-04-10 NOTE — PROGRESS NOTES
Call regarding appt. with PCP on 4/9/25  after hospitalization.  At time of outreach call the patient feels as if their condition has improved  since last visit.  Reviewed the PCP appointment with the pt and addressed any questions or concerns.

## 2025-04-10 NOTE — PROGRESS NOTES
Physical Therapy    Physical Therapy Treatment    Patient Name: Isaac Condon  MRN: [id number]  : 1955  Today's Date: 4/10/2025  Time Calculation  Start Time: 1445  Stop Time: 1530  Time Calculation (min): 45 min    PT Therapeutic Procedures Time Entry  Therapeutic Exercise Time Entry: 45          VISIT:# 2    Current Problem  Problem List Items Addressed This Visit    None  Visit Diagnoses         Codes    Cerebral infarction, unspecified     I63.9             Subjective    Patient reports no pain, no falls.      Pain  Pain Assessment: 0-10  0-10 (Numeric) Pain Score: 0 - No pain       Objective       Initiated there ex per flow sheet.      No LOB throughout session.     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 No LOB noted. Requires min cues for proper technique and reps.        Plan:    Progress strengthening program to improve balance and reduce fall risk.       Goals: (By week 6)  1) Patient will complete TUG <11 seconds to decrease fall risk    2) Increase KAILEE LE strength to 5/5 to ease capability to ascend/descend steps and perform safe transfers    3) Increase core strength to >4/5 to improve posture with standing/lifting activities    4) Increase L SLS balance to >10 seconds for improving proprioceptive abilities with daily activities

## 2025-04-14 ENCOUNTER — TREATMENT (OUTPATIENT)
Dept: OCCUPATIONAL THERAPY | Facility: CLINIC | Age: 70
End: 2025-04-14
Payer: MEDICARE

## 2025-04-14 DIAGNOSIS — R27.8 DECREASED COORDINATION: Primary | ICD-10-CM

## 2025-04-14 DIAGNOSIS — I63.9 CEREBRAL INFARCTION, UNSPECIFIED MECHANISM (MULTI): ICD-10-CM

## 2025-04-14 DIAGNOSIS — I63.9 CEREBRAL INFARCTION, UNSPECIFIED: ICD-10-CM

## 2025-04-14 PROCEDURE — 97110 THERAPEUTIC EXERCISES: CPT | Mod: GO

## 2025-04-14 PROCEDURE — 97530 THERAPEUTIC ACTIVITIES: CPT | Mod: GO

## 2025-04-14 ASSESSMENT — PAIN - FUNCTIONAL ASSESSMENT: PAIN_FUNCTIONAL_ASSESSMENT: 0-10

## 2025-04-14 ASSESSMENT — PAIN SCALES - GENERAL: PAINLEVEL_OUTOF10: 0 - NO PAIN

## 2025-04-14 NOTE — PROGRESS NOTES
Occupational Therapy    OT Treatment    Patient Name: Isaac Condon  MRN: [id number]  Today's Date: 4/14/2025     Time Calculation  Start Time: 1145  Stop Time: 1230  Time Calculation (min): 45 min    Visit Number: 2  Therapeutic Procedures:      OT Therapeutic Procedures Time Entry  Therapeutic Activity Time Entry: 30  Therapeutic Exercise Time Entry: 15                         Current Problem:  1. Decreased coordination        2. Cerebral infarction, unspecified  Follow Up In Occupational Therapy      3. Cerebral infarction, unspecified mechanism (Multi) [I63.9]            Subjective     General: Pt states that he was walking around this weekend and playing with his dog. States that he had no falls, lost his balance a few times however. Pt is completing exercises 1-2x/day.     OT Received On: 04/14/25  Reason for Referral: CVA  Referred By: Eneida Tenorio MD  Pain:  Pain Assessment  Pain Assessment: 0-10  0-10 (Numeric) Pain Score: 0 - No pain    Objective       Therapy/Activity:   Onset: 3/14/25       4/14/2025   Exercises: Reps:    AAROM - dowel tammy Shoulder flexion, abduction     1x3, 5 second holds  Shoulder flexion, abduction, ER/IR, extension     1x10, 5 second holds    Blue foam block  Gross grasp, alt opp, key pinch, 3 pt pinch     1x10              Activities:     Bingo chips - FMC In hand manipulation x9    Stacked x9 chips     *Completed with additional time and frequent drops     Added to HEP    Card flip With RUE while WB on LUE x10     Clothes pins  Place clothespins with R IF tip pinch while WB on LUE.     L IF tip pinch to remove and place on bucket     *Yellow (5), red (4) green (6), blue (6) black (1)   Nuts and bolts  Unscrewed/screwed large (2), medium (2), small (1) bolts from functional bin.     *1 drop, required additional time   HEP provided on HEP provided to pt including shoulder AAROM, weight bearing, and FMC activities. Pt instructed to complete 2-3x a day, 3x10 reps within pain free range.  Handouts provided to pt.  Added extension, ER/IR with dowel tammy, hand strengthening with blue foam block to HEP. Handouts provided.    Modalities:                         Manual:                         Functional review:      Completed on: 4/7/25 OT Evaluation        OP EDUCATION:  Education  Individual(s) Educated: Patient  Education Provided: Anatomy & Physiology, Diagnosis & Precautions, Risk and benefits of OT discussed with patient or other, POC discussed and agreed upon, Fall precautons  Home Program: AROM, AAROM, Activity modification, Strengthening, Handout issued    Assessment:   Pt participated in therapeutic exercises and activities as needed to progress LUE mobility, strength and coordination. Added additional shoulder AAROM with dowel tammy to HEP. Pt able to complete with min verbal/tactile cues. Instructed pt in gentle hand strengthening with blue foam block. Handouts provided. Pt was able to be upgraded during clothespins activity, using higher resistance clips. Pt reported no pain throughout session.      Plan:   Pt to continue addressing LUE strength, mobility and coordination in order to progress independence with I/ADLs.      Goals:  Active       OT Goals       OT Goal 1       Start:  04/07/25    Expected End:  07/07/25       LTG - Patient will indicate/ demonstrate the ability to resume all preinjury ADLs and IADLs without significant limits secondary to decreased ROM, decreased strength and/or pain as indicated by Quickdash score of less than 5%.          OT Goal 2       Start:  04/07/25    Expected End:  07/07/25       Develop and issue HEP to help maximize ROM, strength and tolerance to help maximize return to all pre-onset activities.          OT Goal 3       Start:  04/07/25    Expected End:  07/07/25       Pt will demonstrate increased LUE strength as appropriate with the LUE to be greater than or equal to 4/5 on MMT scale to help patient resume ADLs and IADLs.          OT Goal 4        Start:  04/07/25    Expected End:  07/07/25       Pt will demonstrate increased FMC in L hand as indicated by increased box-and-blocks score 2-5 blocks as compared to R hand.

## 2025-04-15 PROCEDURE — 93248 EXT ECG>7D<15D REV&INTERPJ: CPT | Performed by: INTERNAL MEDICINE

## 2025-04-16 ENCOUNTER — TREATMENT (OUTPATIENT)
Dept: OCCUPATIONAL THERAPY | Facility: CLINIC | Age: 70
End: 2025-04-16
Payer: MEDICARE

## 2025-04-16 ENCOUNTER — TREATMENT (OUTPATIENT)
Dept: PHYSICAL THERAPY | Facility: CLINIC | Age: 70
End: 2025-04-16
Payer: MEDICARE

## 2025-04-16 DIAGNOSIS — I63.9 CEREBRAL INFARCTION, UNSPECIFIED MECHANISM (MULTI): Primary | ICD-10-CM

## 2025-04-16 DIAGNOSIS — I63.9 CEREBRAL INFARCTION, UNSPECIFIED: ICD-10-CM

## 2025-04-16 DIAGNOSIS — R27.8 DECREASED COORDINATION: ICD-10-CM

## 2025-04-16 DIAGNOSIS — R29.898 LEFT ARM WEAKNESS: ICD-10-CM

## 2025-04-16 PROCEDURE — 97110 THERAPEUTIC EXERCISES: CPT | Mod: GO

## 2025-04-16 PROCEDURE — 97530 THERAPEUTIC ACTIVITIES: CPT | Mod: GO

## 2025-04-16 PROCEDURE — 97110 THERAPEUTIC EXERCISES: CPT | Mod: GP,CQ | Performed by: PHYSICAL THERAPY ASSISTANT

## 2025-04-16 ASSESSMENT — PAIN - FUNCTIONAL ASSESSMENT
PAIN_FUNCTIONAL_ASSESSMENT: 0-10
PAIN_FUNCTIONAL_ASSESSMENT: 0-10

## 2025-04-16 ASSESSMENT — PAIN SCALES - GENERAL
PAINLEVEL_OUTOF10: 0 - NO PAIN
PAINLEVEL_OUTOF10: 0 - NO PAIN

## 2025-04-16 NOTE — PROGRESS NOTES
Physical Therapy    Physical Therapy Treatment    Patient Name: Isaac Condon  MRN: [id number]  : 1955  Today's Date: 2025  Time Calculation  Start Time: 1015  Stop Time: 1057  Time Calculation (min): 42 min    PT Therapeutic Procedures Time Entry  Therapeutic Exercise Time Entry: 42          VISIT:# 3    Current Problem  Problem List Items Addressed This Visit    None  Visit Diagnoses         Codes      Cerebral infarction, unspecified     I63.9             Subjective    Patient reports no pain, no falls. Patient reports a little sore after initial session.      Pain  Pain Assessment: 0-10  0-10 (Numeric) Pain Score: 0 - No pain       Objective       No LOB throughout session.   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      Treatments:     EXERCISES Date 4/10/25 Date 25 Date Date    #2 #3            Nustep L5 x 10' L5 x 10'            Shuttle   DLP 4B 3 x 10 4B 3 x 10     Shuttle   SLP        3B 3 x 10 each 3B 3 x 10 each      Shuttle   TR/HR 3B x 20 3B x 30            Qhip Flex 10# x 20 each 20# x 20 each     Qhip Abd 10# x 20 each 20# x 20 each     Qhip Ext   20# x 20            Q Quad 20# 3 x 10 25# 3 x 10     Q Hamstring 35# 3 x 10 35# 3 x 10            Rockerboard X 20 each direction BUE support             Sit to stands  X 10 no UE support                                                 Assessment:   Patient reported no issues with advanced exercises. No LOB noted. Requires min cues for proper technique and reps.        Plan:    Progress strengthening program to improve balance and reduce fall risk.       Goals: (By week 6)  1) Patient will complete TUG <11 seconds to decrease fall risk    2) Increase KAILEE LE strength to 5/5 to ease capability to ascend/descend steps and perform safe transfers    3) Increase core strength to >4/5  to improve posture with standing/lifting activities    4) Increase L SLS balance to >10 seconds for improving proprioceptive abilities with daily activities

## 2025-04-16 NOTE — PROGRESS NOTES
Occupational Therapy    Occupational Therapy Treatment      Name: Isaac Condon  MRN: [id number]  : 1955  Date: 2025  Time Calculation  Start Time: 1100  Stop Time: 1200  Time Calculation (min): 60 min     OT Therapeutic Procedures Time Entry  Therapeutic Activity Time Entry: 30  Therapeutic Exercise Time Entry: 30                 VISIT# 3    Current Problem  1. Cerebral infarction, unspecified mechanism (Multi) [I63.9]        2. Decreased coordination        3. Left arm weakness        4. Cerebral infarction, unspecified [I63.9]            Therapeutic Procedure Codes:  OT Therapeutic Procedures Time Entry  Therapeutic Activity Time Entry: 30  Therapeutic Exercise Time Entry: 30    Assessment:      Pt completed therapeutic exercises to address strength and ROM of wrist and digits, pt demonstrated improved strength this date, Pt tolerated increase in strengthening exercises without report of discomfort or pain. Pt completed exercises with some difficulty.      Plan:     Pt to continue POC as appropriate to address ROM and strength to increase efficiency with IADLs.     Subjective   General:       Pain Assessment:  Pain Assessment  Pain Assessment: 0-10  0-10 (Numeric) Pain Score: 0 - No pain    Objective     Nine Hole Peg Test: 25  RUE 33 seconds   LUE 48 seconds         Therapy:  Onset: 3/14/25        2025    Exercises: Reps:     AAROM - dowel tammy Shoulder flexion, abduction     1x3, 5 second holds  Shoulder flexion, abduction, ER/IR, extension     1x10, 5 second holds  Passive stretch with moist heat-Table slides, abd, flex 10x each   Blue foam block  Gross grasp, alt opp, key pinch, 3 pt pinch     1x10     Free Weights   Wrist curls- flex/ext 1x10-4#  RD/UD 1x10 1#  Pro/sup 1x10 1#  Wrist Roll Ups 4# 1x each 4 ways.         Activities:      Bingo chips - FMC In hand manipulation x9    Stacked x9 chips     *Completed with additional time and frequent drops     Added to HEP     Card flip With  RUE while WB on LUE x10   7UP with L hand x 3  Flip on table, no slide and flip   Clothes pins  Place clothespins with R IF tip pinch while WB on LUE.     L IF tip pinch to remove and place on bucket     *Yellow (5), red (4) green (6), blue (6) black (1) Place clothespins with  tip pinch alternating fingers  Harder clips with the index and middle, easier clips with the ring and little fingers.   Nuts and bolts  Unscrewed/screwed large (2), medium (2), small (1) bolts from functional bin.     *1 drop, required additional time 5 out from box, then timed assembly/disassembly 5 bolt assembly 52sec  Disassembly 47 sec  5 back into box   HEP provided on HEP provided to pt including shoulder AAROM, weight bearing, and FMC activities. Pt instructed to complete 2-3x a day, 3x10 reps within pain free range. Handouts provided to pt.  Added extension, ER/IR with dowel tammy, hand strengthening with blue foam block to HEP. Handouts provided.  Encouraged 4- 15-20 min exercise/ activity sessions per day to work with strength and coordination tasks to help improve fine motor and strength.   Modalities:                              Manual:                              Functional review:       Completed on: 4/7/25 OT Evaluation   9 hole peg test       OP Education:   Encouraged exercise/ activity sessions at least 4x/day to help maximize return of strength coordination and efficiency of his ADL and IADLS.     Goals:  Active       OT Goals       OT Goal 1       Start:  04/07/25    Expected End:  07/07/25       LTG - Patient will indicate/ demonstrate the ability to resume all preinjury ADLs and IADLs without significant limits secondary to decreased ROM, decreased strength and/or pain as indicated by Quickdash score of less than 5%.          OT Goal 2       Start:  04/07/25    Expected End:  07/07/25       Develop and issue HEP to help maximize ROM, strength and tolerance to help maximize return to all pre-onset activities.          OT  Goal 3       Start:  04/07/25    Expected End:  07/07/25       Pt will demonstrate increased LUE strength as appropriate with the LUE to be greater than or equal to 4/5 on MMT scale to help patient resume ADLs and IADLs.          OT Goal 4       Start:  04/07/25    Expected End:  07/07/25       Pt will demonstrate increased FMC in L hand as indicated by increased box-and-blocks score 2-5 blocks as compared to R hand.

## 2025-04-18 ENCOUNTER — TREATMENT (OUTPATIENT)
Dept: PHYSICAL THERAPY | Facility: CLINIC | Age: 70
End: 2025-04-18
Payer: MEDICARE

## 2025-04-18 ENCOUNTER — TELEPHONE (OUTPATIENT)
Dept: CARDIOLOGY | Facility: CLINIC | Age: 70
End: 2025-04-18
Payer: MEDICARE

## 2025-04-18 DIAGNOSIS — I63.9 CEREBRAL INFARCTION, UNSPECIFIED: Primary | ICD-10-CM

## 2025-04-18 PROCEDURE — 97110 THERAPEUTIC EXERCISES: CPT | Mod: GP

## 2025-04-18 ASSESSMENT — PAIN - FUNCTIONAL ASSESSMENT: PAIN_FUNCTIONAL_ASSESSMENT: 0-10

## 2025-04-18 ASSESSMENT — PAIN SCALES - GENERAL: PAINLEVEL_OUTOF10: 0 - NO PAIN

## 2025-04-18 NOTE — PROGRESS NOTES
Physical Therapy    Physical Therapy Treatment    Patient Name: Isaac Condon  MRN: [id number]  : 1955  Today's Date: 2025  Time Calculation  Start Time: 1445  Stop Time: 1530  Time Calculation (min): 45 min    PT Therapeutic Procedures Time Entry  Therapeutic Exercise Time Entry: 45          VISIT:# 4    Current Problem  Problem List Items Addressed This Visit    None  Visit Diagnoses         Codes      Cerebral infarction, unspecified    -  Primary I63.9            Subjective    Patient reports experiencing no pain and has had no falls.    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      Treatments:     EXERCISES Date 4/10/25 Date 25 Date 25 Date    #2 #3 #4           Nustep L5 x 10' L5 x 10' L5 10'           Shuttle   DLP 4B 3 x 10 4B 3 x 10 4B 3 x 10    Shuttle   SLP        3B 3 x 10 each 3B 3 x 10 each  3B 3 x 10 each     Shuttle   TR/HR 3B x 20 3B x 30 3B 3 x 10            Qhip Flex 10# x 20 each 20# x 20 each 20# x 30 each    Qhip Abd 10# x 20 each 20# x 20 each 20# x 30 each    Qhip Ext   20# x 20 20# x 30 each           Q Quad 20# 3 x 10 25# 3 x 10 25# x 40    Q Hamstring 35# 3 x 10 35# 3 x 10 35# x 50           Rockerboard X 20 each direction BUE support             Sit to stands  X 10 no UE support X 20                                                Assessment:   Patient reports experiencing KAILEE calf soreness following treatment.  Patient performed more reps than directed with most exercises.      Plan:    Progress strengthening program to improve balance and reduce fall risk.       Goals: (By week 6)  1) Patient will complete TUG <11 seconds to decrease fall risk    2) Increase KAILEE LE strength to 5/5 to ease capability to ascend/descend steps and perform safe transfers    3) Increase core strength to >4/5 to improve  posture with standing/lifting activities    4) Increase L SLS balance to >10 seconds for improving proprioceptive abilities with daily activities -progressing

## 2025-04-18 NOTE — TELEPHONE ENCOUNTER
LVM for pt to r/s Dr. Longoria appt 4/21 (provider out of office) to Tues 4/22 12:30pm. Req pt call office to confirm this change.

## 2025-04-21 ENCOUNTER — APPOINTMENT (OUTPATIENT)
Dept: CARDIOLOGY | Facility: HOSPITAL | Age: 70
End: 2025-04-21
Payer: MEDICARE

## 2025-04-21 ENCOUNTER — DOCUMENTATION (OUTPATIENT)
Dept: OCCUPATIONAL THERAPY | Facility: CLINIC | Age: 70
End: 2025-04-21
Payer: MEDICARE

## 2025-04-21 ENCOUNTER — APPOINTMENT (OUTPATIENT)
Dept: OCCUPATIONAL THERAPY | Facility: CLINIC | Age: 70
End: 2025-04-21
Payer: MEDICARE

## 2025-04-21 ENCOUNTER — APPOINTMENT (OUTPATIENT)
Dept: PHYSICAL THERAPY | Facility: CLINIC | Age: 70
End: 2025-04-21
Payer: MEDICARE

## 2025-04-21 ENCOUNTER — DOCUMENTATION (OUTPATIENT)
Dept: PHYSICAL THERAPY | Facility: CLINIC | Age: 70
End: 2025-04-21
Payer: MEDICARE

## 2025-04-21 NOTE — PROGRESS NOTES
Occupational Therapy                 Therapy Communication Note    Patient Name: Isaac Condon  MRN: [id number]  Today's Date: 4/21/2025     Discipline: Occupational Therapy    Missed Visit Reason:  Could not sleep last night.     Missed Time: Cancel

## 2025-04-21 NOTE — PROGRESS NOTES
Physical Therapy                 Therapy Communication Note    Patient Name: Isaac Condon  MRN: [id number]  Department: [address]  Room: Room/bed info not found  Today's Date: 4/21/2025     Discipline: Physical Therapy          Missed Visit Reason:  unable to sleep last night    Missed Time: Cancel

## 2025-04-21 NOTE — PROGRESS NOTES
Counseling:  The patient was counseled regarding diagnostic results, instructions for management, risk factor reductions, prognosis, patient and family education, impressions, risks and benefits of treatment options and importance of compliance with treatment.      Chief Complaint:   The patient presents today for post-hospitalization followup of PFO.      History Of Present Illness:    Isaac Condon is a 70 y.o. male patient whose PMH is significant for hyperlipidemia, carotid stenosis, alcohol abuse (in remission), and lung nodules. He presents today for post-hospitalization followup of PFO. The patient was admitted to hospital from 03/16/2025 to 03/19/2025 after presenting to the ED with symptoms of stroke. Cardiology was consulted for echo showing a PFO. The cause of the patient's stroke seems to be due to carotid artery disease rather than PFO. Outpatient Holter monitoring performed from 03/19/2025 to 04/02/2025 revealed 10 runs of SVT. Today, the patient states that he is feeling improved since being discharged home from hospital. He has been seen by Dr. Croosk who has recommended proceeding with TCAR. BP has been hypotensive. The patient is compliant with his prescribed medications.      Last Recorded Vitals:  Vitals:    04/22/25 1227   BP: 94/72   BP Location: Left arm   Pulse: 82   Weight: 67.6 kg (149 lb)   Height: 1.829 m (6')       Past Surgical History:  He has a past surgical history that includes Cochlear implant and Foot surgery.      Social History:  He reports that he quit smoking about 3 months ago. His smoking use included cigarettes. He has been exposed to tobacco smoke. He has quit using smokeless tobacco. He reports that he does not currently use alcohol after a past usage of about 1.0 standard drink of alcohol per week. He reports current drug use. Drug: Marijuana.    Family History:  Family History[1]     Allergies:  Patient has no known allergies.    Outpatient Medications:  Current  Outpatient Medications   Medication Instructions    albuterol 90 mcg/actuation inhaler 2 puffs, inhalation, Every 6 hours PRN    aspirin 81 mg, oral, Daily    atorvastatin (LIPITOR) 40 mg, oral, Nightly    cholecalciferol (VITAMIN D-3) 2,000 Units, Daily    clopidogrel (PLAVIX) 75 mg, oral, Daily    losartan (COZAAR) 25 mg, oral, Daily    melatonin 3 mg, Daily PRN    multivitamin with minerals iron-free (Centrum Silver) 1 tablet, Daily    sennosides-docusate sodium (Soledad-Colace) 8.6-50 mg tablet 1 tablet, oral, Nightly    thiamine (VITAMIN B-1) 100 mg, Daily RT     Review of Systems   All other systems reviewed and are negative.     Physical Exam:  Constitutional:       Appearance: Healthy appearance. Not in distress.   Neck:      Vascular: No JVR. JVD normal.   Pulmonary:      Effort: Pulmonary effort is normal.      Breath sounds: Normal breath sounds. No wheezing. No rhonchi. No rales.   Chest:      Chest wall: Not tender to palpatation.   Cardiovascular:      PMI at left midclavicular line. Normal rate. Regular rhythm. Normal S1. Normal S2.       Murmurs: There is no murmur.      No gallop.  No click. No rub.   Pulses:     Intact distal pulses.   Edema:     Peripheral edema absent.   Abdominal:      General: Bowel sounds are normal.      Palpations: Abdomen is soft.      Tenderness: There is no abdominal tenderness.   Musculoskeletal: Normal range of motion.         General: No tenderness. Skin:     General: Skin is warm and dry.   Neurological:      General: No focal deficit present.      Mental Status: Alert and oriented to person, place and time.          Last Labs:  CBC -  Lab Results   Component Value Date    WBC 8.7 03/19/2025    HGB 16.9 03/19/2025    HCT 47.1 03/19/2025    MCV 95 03/19/2025     (L) 03/19/2025       CMP -  Lab Results   Component Value Date    CALCIUM 9.5 03/19/2025    PROT 7.2 03/16/2025    ALBUMIN 4.5 03/16/2025    AST 18 03/16/2025    ALT 16 03/16/2025    ALKPHOS 101 03/16/2025     BILITOT 1.2 03/16/2025       LIPID PANEL -   Lab Results   Component Value Date    CHOL 213 (H) 03/16/2025    TRIG 71 03/16/2025    .7 03/16/2025    CHHDL 1.9 03/16/2025    LDLF 123 (H) 08/21/2023    VLDL 14 03/16/2025    NHDL 102 03/16/2025       RENAL FUNCTION PANEL -   Lab Results   Component Value Date    GLUCOSE 88 03/19/2025     03/19/2025    K 3.2 (L) 03/19/2025     03/19/2025    CO2 24 03/19/2025    ANIONGAP 19 03/19/2025    BUN 16 03/19/2025    CREATININE 0.83 03/19/2025    GFRMALE >90 08/21/2023    CALCIUM 9.5 03/19/2025    ALBUMIN 4.5 03/16/2025        Lab Results   Component Value Date    BNP 28 03/16/2025    HGBA1C 4.4 03/16/2025       Last Cardiology Tests:  03/19/2025 to 04/02/2025 - Holter Monitor  1. Predominant underlying rhythm was sinus rhythm; min HR 53 bpm, max  bpm, avg HR 78 bpm.  2. 10 supraventricular tachycardia runs occurred; fastest interval lasting 7 beats with max rate 218 bpm, longest lasting 13 beats with avg rate 133 bpm.  3. Isolated SVEs were rare, SVE couplets were rare, and SVE triplets were rare.  4. Isolated VEs were rare, VE couplets were rare, and no VE triplets were present.  5. Ventricular bigeminy and trigeminy were present.      03/18/2025 - Vascular Lab Carotid Artery Duplex    1. Right Carotid: Findings are consistent with less than 50% stenosis of the right proximal internal carotid artery. Laminar flow seen by color Doppler. Right external carotid artery appears patent with no evidence of stenosis. The right vertebral artery is patent with antegrade flow. No evidence of hemodynamically significant stenosis in the right subclavian artery.  2. Left Carotid: Findings are consistent with less than 50% stenosis of the left proximal internal carotid artery. Laminar flow seen by color Doppler. Left external carotid artery appears patent with no evidence of stenosis. The left vertebral artery is patent with antegrade flow. No evidence of  hemodynamically significant stenosis in the left subclavian artery.    03/17/2025 - TTE  1. The left ventricular systolic function is normal, with a Shukla's biplane calculated ejection fraction of 64%.  2. There is normal right ventricular global systolic function.  3. A bubble study using agitated saline was performed. Bubble study is positive.    Lab review: I have personally reviewed the laboratory result(s).  Diagnostic review: I have personally reviewed the result(s) of the Echocardiogram and Carotid Duplex.    Assessment/Plan   1) CVA - PFO by Echo  On ASA 81 mg daily, Plavix 75 mg daily, atorvastatin 40 mg daily, losartan 25 mg daily  Hospital admission 03/16/2025 to 03/19/2025 with stroke  TTE with LVEF 64%, normal RV systolic function, positive Bubble study  Carotid duplex with <50% stenosis bilaterally  CTA head/neck with heavy atherosclerotic calcifications through carotid siphons, suspected severe segmental stenosis cavernous segment right internal carotid artery, approximately 70% short-segment atherosclerotic stenosis of the right carotid bifurcation, severe short-segment stenosis distal right subclavian artery.  CVA seems to be d/t carotid artery disease  Do not suspect that CVA is related to PFO  Outpatient Holter with 10 runs of SVT  Improved   BP has been hypotensive   Discontinue losartan  Continue all other medications as prescribed   Will check Dobutamine stress echo prior to TCAR  F/U after testing     2) SVT  Prior history of stroke in 12/2024, with recurrent stroke 03/2025  Outpatient Holter with 10 runs of SVT     3) Carotid Artery Disease   Hospital admission 03/16/2025 to 03/19/2025 with stroke  Carotid duplex with <50% stenosis bilaterally  CTA head/neck with heavy atherosclerotic calcifications through carotid siphons, suspected severe segmental stenosis cavernous segment right internal carotid artery, approximately 70% short-segment atherosclerotic stenosis of the right carotid  bifurcation, severe short-segment stenosis distal right subclavian artery.  CVA seems to be d/t carotid artery disease  Seen by Dr. Crooks 04/03/2025 - recommended proceeding with TCAR pending cardiac clearance  Check Dobutamine stress echo as part of preop clearance   F/U after testing         Scribe Attestation  By signing my name below, I, Zuleyka Alcocer   attest that this documentation has been prepared under the direction and in the presence of Daniel Longoria MD.        [1]   Family History  Problem Relation Name Age of Onset    Cancer Mother      COPD Father

## 2025-04-22 ENCOUNTER — OFFICE VISIT (OUTPATIENT)
Dept: CARDIOLOGY | Facility: HOSPITAL | Age: 70
End: 2025-04-22
Payer: MEDICARE

## 2025-04-22 VITALS
SYSTOLIC BLOOD PRESSURE: 94 MMHG | WEIGHT: 149 LBS | BODY MASS INDEX: 20.18 KG/M2 | DIASTOLIC BLOOD PRESSURE: 72 MMHG | HEART RATE: 82 BPM | HEIGHT: 72 IN

## 2025-04-22 DIAGNOSIS — I77.9 BILATERAL CAROTID ARTERY DISEASE, UNSPECIFIED TYPE: ICD-10-CM

## 2025-04-22 DIAGNOSIS — R94.31 ABNORMAL ELECTROCARDIOGRAM (ECG) (EKG): ICD-10-CM

## 2025-04-22 DIAGNOSIS — Z01.810 PREOP CARDIOVASCULAR EXAM: Primary | ICD-10-CM

## 2025-04-22 LAB
ATRIAL RATE: 82 BPM
P AXIS: 87 DEGREES
P OFFSET: 207 MS
P ONSET: 150 MS
PR INTERVAL: 156 MS
Q ONSET: 228 MS
QRS COUNT: 14 BEATS
QRS DURATION: 80 MS
QT INTERVAL: 362 MS
QTC CALCULATION(BAZETT): 422 MS
QTC FREDERICIA: 401 MS
R AXIS: 68 DEGREES
T AXIS: 84 DEGREES
T OFFSET: 409 MS
VENTRICULAR RATE: 82 BPM

## 2025-04-22 PROCEDURE — 1123F ACP DISCUSS/DSCN MKR DOCD: CPT | Performed by: INTERNAL MEDICINE

## 2025-04-22 PROCEDURE — 99213 OFFICE O/P EST LOW 20 MIN: CPT | Performed by: INTERNAL MEDICINE

## 2025-04-22 PROCEDURE — 1160F RVW MEDS BY RX/DR IN RCRD: CPT | Performed by: INTERNAL MEDICINE

## 2025-04-22 PROCEDURE — 3008F BODY MASS INDEX DOCD: CPT | Performed by: INTERNAL MEDICINE

## 2025-04-22 PROCEDURE — 93005 ELECTROCARDIOGRAM TRACING: CPT | Performed by: INTERNAL MEDICINE

## 2025-04-22 PROCEDURE — 93010 ELECTROCARDIOGRAM REPORT: CPT | Performed by: INTERNAL MEDICINE

## 2025-04-22 PROCEDURE — 1036F TOBACCO NON-USER: CPT | Performed by: INTERNAL MEDICINE

## 2025-04-22 PROCEDURE — 99213 OFFICE O/P EST LOW 20 MIN: CPT | Mod: 25 | Performed by: INTERNAL MEDICINE

## 2025-04-22 PROCEDURE — 1159F MED LIST DOCD IN RCRD: CPT | Performed by: INTERNAL MEDICINE

## 2025-04-22 RX ORDER — ATROPINE SULFATE 0.1 MG/ML
.25-5 INJECTION INTRAVENOUS ONCE AS NEEDED
Status: CANCELLED | OUTPATIENT
Start: 2025-04-22

## 2025-04-22 RX ORDER — DOBUTAMINE HYDROCHLORIDE 100 MG/100ML
5-40 INJECTION INTRAVENOUS CONTINUOUS
Status: CANCELLED | OUTPATIENT
Start: 2025-04-22

## 2025-04-22 ASSESSMENT — ENCOUNTER SYMPTOMS
LOSS OF SENSATION IN FEET: 0
OCCASIONAL FEELINGS OF UNSTEADINESS: 0
DEPRESSION: 0

## 2025-04-22 NOTE — PATIENT INSTRUCTIONS
Discontinue losartan.  Continue all other medications as prescribed.  Prior to your surgery with Dr. Crooks, Dr. Longoria has ordered a stress test to ensure your heart is getting adequate blood flow and there is no evidence of any blockages within the heart arteries.    Followup with Dr. Longoria after the above test.     If you have any questions or cardiac concerns, please call our office at [phone].

## 2025-04-22 NOTE — LETTER
April 22, 2025     Giles Finch MD  [address]  [facility], [address] [place] [id number]    Patient: Isaac Condon   YOB: 1955   Date of Visit: 4/22/2025       Dear Dr. Giles Finch MD:    Thank you for referring Isaac Condon to me for evaluation. Below are my notes for this consultation.  If you have questions, please do not hesitate to call me. I look forward to following your patient along with you.       Sincerely,     Daniel Longoria MD      CC: No Recipients  ______________________________________________________________________________________    Counseling:  The patient was counseled regarding diagnostic results, instructions for management, risk factor reductions, prognosis, patient and family education, impressions, risks and benefits of treatment options and importance of compliance with treatment.      Chief Complaint:   The patient presents today for post-hospitalization followup of PFO.      History Of Present Illness:    Isaac Condon is a 70 y.o. male patient whose PMH is significant for hyperlipidemia, carotid stenosis, alcohol abuse (in remission), and lung nodules. He presents today for post-hospitalization followup of PFO. The patient was admitted to hospital from 03/16/2025 to 03/19/2025 after presenting to the ED with symptoms of stroke. Cardiology was consulted for echo showing a PFO. The cause of the patient's stroke seems to be due to carotid artery disease rather than PFO. Outpatient Holter monitoring performed from 03/19/2025 to 04/02/2025 revealed 10 runs of SVT. Today, the patient states that he is feeling improved since being discharged home from hospital. He has been seen by Dr. Crooks who has recommended proceeding with TCAR. BP has been hypotensive. The patient is compliant with his prescribed medications.      Last Recorded Vitals:  Vitals:    04/22/25 1227   BP: 94/72   BP Location: Left arm   Pulse: 82   Weight: 67.6 kg (149 lb)    Height: 1.829 m (6')       Past Surgical History:  He has a past surgical history that includes Cochlear implant and Foot surgery.      Social History:  He reports that he quit smoking about 3 months ago. His smoking use included cigarettes. He has been exposed to tobacco smoke. He has quit using smokeless tobacco. He reports that he does not currently use alcohol after a past usage of about 1.0 standard drink of alcohol per week. He reports current drug use. Drug: Marijuana.    Family History:  Family History[1]     Allergies:  Patient has no known allergies.    Outpatient Medications:  Current Outpatient Medications   Medication Instructions   • albuterol 90 mcg/actuation inhaler 2 puffs, inhalation, Every 6 hours PRN   • aspirin 81 mg, oral, Daily   • atorvastatin (LIPITOR) 40 mg, oral, Nightly   • cholecalciferol (VITAMIN D-3) 2,000 Units, Daily   • clopidogrel (PLAVIX) 75 mg, oral, Daily   • losartan (COZAAR) 25 mg, oral, Daily   • melatonin 3 mg, Daily PRN   • multivitamin with minerals iron-free (Centrum Silver) 1 tablet, Daily   • sennosides-docusate sodium (Soledad-Colace) 8.6-50 mg tablet 1 tablet, oral, Nightly   • thiamine (VITAMIN B-1) 100 mg, Daily RT     Review of Systems   All other systems reviewed and are negative.     Physical Exam:  Constitutional:       Appearance: Healthy appearance. Not in distress.   Neck:      Vascular: No JVR. JVD normal.   Pulmonary:      Effort: Pulmonary effort is normal.      Breath sounds: Normal breath sounds. No wheezing. No rhonchi. No rales.   Chest:      Chest wall: Not tender to palpatation.   Cardiovascular:      PMI at left midclavicular line. Normal rate. Regular rhythm. Normal S1. Normal S2.       Murmurs: There is no murmur.      No gallop.  No click. No rub.   Pulses:     Intact distal pulses.   Edema:     Peripheral edema absent.   Abdominal:      General: Bowel sounds are normal.      Palpations: Abdomen is soft.      Tenderness: There is no abdominal  tenderness.   Musculoskeletal: Normal range of motion.         General: No tenderness. Skin:     General: Skin is warm and dry.   Neurological:      General: No focal deficit present.      Mental Status: Alert and oriented to person, place and time.          Last Labs:  CBC -  Lab Results   Component Value Date    WBC 8.7 03/19/2025    HGB 16.9 03/19/2025    HCT 47.1 03/19/2025    MCV 95 03/19/2025     (L) 03/19/2025       CMP -  Lab Results   Component Value Date    CALCIUM 9.5 03/19/2025    PROT 7.2 03/16/2025    ALBUMIN 4.5 03/16/2025    AST 18 03/16/2025    ALT 16 03/16/2025    ALKPHOS 101 03/16/2025    BILITOT 1.2 03/16/2025       LIPID PANEL -   Lab Results   Component Value Date    CHOL 213 (H) 03/16/2025    TRIG 71 03/16/2025    .7 03/16/2025    CHHDL 1.9 03/16/2025    LDLF 123 (H) 08/21/2023    VLDL 14 03/16/2025    NHDL 102 03/16/2025       RENAL FUNCTION PANEL -   Lab Results   Component Value Date    GLUCOSE 88 03/19/2025     03/19/2025    K 3.2 (L) 03/19/2025     03/19/2025    CO2 24 03/19/2025    ANIONGAP 19 03/19/2025    BUN 16 03/19/2025    CREATININE 0.83 03/19/2025    GFRMALE >90 08/21/2023    CALCIUM 9.5 03/19/2025    ALBUMIN 4.5 03/16/2025        Lab Results   Component Value Date    BNP 28 03/16/2025    HGBA1C 4.4 03/16/2025       Last Cardiology Tests:  03/19/2025 to 04/02/2025 - Holter Monitor  1. Predominant underlying rhythm was sinus rhythm; min HR 53 bpm, max  bpm, avg HR 78 bpm.  2. 10 supraventricular tachycardia runs occurred; fastest interval lasting 7 beats with max rate 218 bpm, longest lasting 13 beats with avg rate 133 bpm.  3. Isolated SVEs were rare, SVE couplets were rare, and SVE triplets were rare.  4. Isolated VEs were rare, VE couplets were rare, and no VE triplets were present.  5. Ventricular bigeminy and trigeminy were present.      03/18/2025 - Vascular Lab Carotid Artery Duplex    1. Right Carotid: Findings are consistent with less than  50% stenosis of the right proximal internal carotid artery. Laminar flow seen by color Doppler. Right external carotid artery appears patent with no evidence of stenosis. The right vertebral artery is patent with antegrade flow. No evidence of hemodynamically significant stenosis in the right subclavian artery.  2. Left Carotid: Findings are consistent with less than 50% stenosis of the left proximal internal carotid artery. Laminar flow seen by color Doppler. Left external carotid artery appears patent with no evidence of stenosis. The left vertebral artery is patent with antegrade flow. No evidence of hemodynamically significant stenosis in the left subclavian artery.    03/17/2025 - TTE  1. The left ventricular systolic function is normal, with a Shukla's biplane calculated ejection fraction of 64%.  2. There is normal right ventricular global systolic function.  3. A bubble study using agitated saline was performed. Bubble study is positive.    Lab review: I have personally reviewed the laboratory result(s).  Diagnostic review: I have personally reviewed the result(s) of the Echocardiogram and Carotid Duplex.    Assessment/Plan  1) CVA - PFO by Echo  On ASA 81 mg daily, Plavix 75 mg daily, atorvastatin 40 mg daily, losartan 25 mg daily  Hospital admission 03/16/2025 to 03/19/2025 with stroke  TTE with LVEF 64%, normal RV systolic function, positive Bubble study  Carotid duplex with <50% stenosis bilaterally  CTA head/neck with heavy atherosclerotic calcifications through carotid siphons, suspected severe segmental stenosis cavernous segment right internal carotid artery, approximately 70% short-segment atherosclerotic stenosis of the right carotid bifurcation, severe short-segment stenosis distal right subclavian artery.  CVA seems to be d/t carotid artery disease  Do not suspect that CVA is related to PFO  Outpatient Holter with 10 runs of SVT  Improved   BP has been hypotensive   Discontinue  losartan  Continue all other medications as prescribed   Will check Dobutamine stress echo prior to TCAR  F/U after testing     2) SVT  Prior history of stroke in 12/2024, with recurrent stroke 03/2025  Outpatient Holter with 10 runs of SVT     3) Carotid Artery Disease   Hospital admission 03/16/2025 to 03/19/2025 with stroke  Carotid duplex with <50% stenosis bilaterally  CTA head/neck with heavy atherosclerotic calcifications through carotid siphons, suspected severe segmental stenosis cavernous segment right internal carotid artery, approximately 70% short-segment atherosclerotic stenosis of the right carotid bifurcation, severe short-segment stenosis distal right subclavian artery.  CVA seems to be d/t carotid artery disease  Seen by Dr. Crooks 04/03/2025 - recommended proceeding with TCAR pending cardiac clearance  Check Dobutamine stress echo as part of preop clearance   F/U after testing         Scribe Attestation  By signing my name below, I, Zoë Ray, Scribe   attest that this documentation has been prepared under the direction and in the presence of Daniel Longoria MD.        [1]  Family History  Problem Relation Name Age of Onset   • Cancer Mother     • COPD Father          [1]  Family History  Problem Relation Name Age of Onset   • Cancer Mother     • COPD Father

## 2025-04-23 ENCOUNTER — HOSPITAL ENCOUNTER (OUTPATIENT)
Dept: CARDIOLOGY | Facility: HOSPITAL | Age: 70
Discharge: HOME | End: 2025-04-23
Payer: MEDICARE

## 2025-04-23 ENCOUNTER — APPOINTMENT (OUTPATIENT)
Dept: OCCUPATIONAL THERAPY | Facility: CLINIC | Age: 70
End: 2025-04-23
Payer: MEDICARE

## 2025-04-23 ENCOUNTER — APPOINTMENT (OUTPATIENT)
Dept: PHYSICAL THERAPY | Facility: CLINIC | Age: 70
End: 2025-04-23
Payer: MEDICARE

## 2025-04-23 ENCOUNTER — DOCUMENTATION (OUTPATIENT)
Dept: OCCUPATIONAL THERAPY | Facility: CLINIC | Age: 70
End: 2025-04-23

## 2025-04-23 DIAGNOSIS — Z01.810 PREOP CARDIOVASCULAR EXAM: ICD-10-CM

## 2025-04-23 DIAGNOSIS — R94.31 ABNORMAL ELECTROCARDIOGRAM (ECG) (EKG): ICD-10-CM

## 2025-04-23 DIAGNOSIS — I77.9 BILATERAL CAROTID ARTERY DISEASE, UNSPECIFIED TYPE: ICD-10-CM

## 2025-04-23 PROCEDURE — 93018 CV STRESS TEST I&R ONLY: CPT | Performed by: INTERNAL MEDICINE

## 2025-04-23 PROCEDURE — 93016 CV STRESS TEST SUPVJ ONLY: CPT | Performed by: INTERNAL MEDICINE

## 2025-04-23 PROCEDURE — 2500000004 HC RX 250 GENERAL PHARMACY W/ HCPCS (ALT 636 FOR OP/ED): Mod: JZ | Performed by: INTERNAL MEDICINE

## 2025-04-23 PROCEDURE — 93350 STRESS TTE ONLY: CPT

## 2025-04-23 PROCEDURE — 93350 STRESS TTE ONLY: CPT | Performed by: INTERNAL MEDICINE

## 2025-04-23 RX ORDER — METOPROLOL TARTRATE 1 MG/ML
5 INJECTION, SOLUTION INTRAVENOUS ONCE
Status: COMPLETED | OUTPATIENT
Start: 2025-04-23 | End: 2025-04-23

## 2025-04-23 RX ORDER — DOBUTAMINE HYDROCHLORIDE 100 MG/100ML
5-40 INJECTION INTRAVENOUS CONTINUOUS
Status: DISCONTINUED | OUTPATIENT
Start: 2025-04-23 | End: 2025-04-24 | Stop reason: HOSPADM

## 2025-04-23 RX ORDER — ATROPINE SULFATE 0.1 MG/ML
.25-5 INJECTION INTRAVENOUS ONCE AS NEEDED
Status: DISCONTINUED | OUTPATIENT
Start: 2025-04-23 | End: 2025-04-24 | Stop reason: HOSPADM

## 2025-04-23 RX ADMIN — METOPROLOL TARTRATE 5 MG: 5 INJECTION INTRAVENOUS at 10:34

## 2025-04-23 RX ADMIN — PERFLUTREN 8 ML OF DILUTION: 6.52 INJECTION, SUSPENSION INTRAVENOUS at 10:05

## 2025-04-23 RX ADMIN — DOBUTAMINE HYDROCHLORIDE 40 MCG/KG/MIN: 100 INJECTION INTRAVENOUS at 10:22

## 2025-04-23 NOTE — PROGRESS NOTES
Counseling:  The patient was counseled regarding diagnostic results, instructions for management, risk factor reductions, prognosis, patient and family education, impressions, risks and benefits of treatment options and importance of compliance with treatment.      Chief Complaint:   The patient presents today for 2-day followup of stress echo performed as part of preop workup prior to TCAR.     History Of Present Illness:    Isaac Condon is a 70 y.o. male patient who presents today for 2-day followup of stress echo performed as part of preop workup prior to TCAR. His PMH is significant for PFO, hyperlipidemia, carotid stenosis, alcohol abuse (in remission), and lung nodules. Dobutamine stress echo performed 04/23/2025 was negative for ischemia. Today, the patient states that he is feeling well with no new cardiac complaints.      Last Recorded Vitals:  Vitals:    04/24/25 1034   BP: 98/58   BP Location: Left arm   Patient Position: Sitting   BP Cuff Size: Adult   Pulse: 74   SpO2: 95%   Weight: 67.6 kg (149 lb)   Height: 1.829 m (6')       Past Surgical History:  He has a past surgical history that includes Cochlear implant and Foot surgery.      Social History:  He reports that he quit smoking about 3 months ago. His smoking use included cigarettes. He has been exposed to tobacco smoke. He has quit using smokeless tobacco. He reports that he does not currently use alcohol after a past usage of about 1.0 standard drink of alcohol per week. He reports current drug use. Drug: Marijuana.    Family History:  Family History[1]     Allergies:  Patient has no known allergies.    Outpatient Medications:  Current Outpatient Medications   Medication Instructions    albuterol 90 mcg/actuation inhaler 2 puffs, inhalation, Every 6 hours PRN    aspirin 81 mg, oral, Daily    atorvastatin (LIPITOR) 40 mg, oral, Nightly    cholecalciferol (VITAMIN D-3) 2,000 Units, Daily    clopidogrel (PLAVIX) 75 mg, oral, Daily    melatonin 3  mg, Daily PRN    multivitamin with minerals iron-free (Centrum Silver) 1 tablet, Daily    sennosides-docusate sodium (Soledad-Colace) 8.6-50 mg tablet 1 tablet, oral, Nightly    thiamine (VITAMIN B-1) 100 mg, Daily RT     Review of Systems   All other systems reviewed and are negative.     Physical Exam:  Constitutional:       Appearance: Healthy appearance. Not in distress.   Neck:      Vascular: No JVR. JVD normal.   Pulmonary:      Effort: Pulmonary effort is normal.      Breath sounds: Normal breath sounds. No wheezing. No rhonchi. No rales.   Chest:      Chest wall: Not tender to palpatation.   Cardiovascular:      PMI at left midclavicular line. Normal rate. Regular rhythm. Normal S1. Normal S2.       Murmurs: There is no murmur.      No gallop.  No click. No rub.   Pulses:     Intact distal pulses.   Edema:     Peripheral edema absent.   Abdominal:      General: Bowel sounds are normal.      Palpations: Abdomen is soft.      Tenderness: There is no abdominal tenderness.   Musculoskeletal: Normal range of motion.         General: No tenderness. Skin:     General: Skin is warm and dry.   Neurological:      General: No focal deficit present.      Mental Status: Alert and oriented to person, place and time.          Last Labs:  CBC -  Lab Results   Component Value Date    WBC 8.7 03/19/2025    HGB 16.9 03/19/2025    HCT 47.1 03/19/2025    MCV 95 03/19/2025     (L) 03/19/2025       CMP -  Lab Results   Component Value Date    CALCIUM 9.5 03/19/2025    PROT 7.2 03/16/2025    ALBUMIN 4.5 03/16/2025    AST 18 03/16/2025    ALT 16 03/16/2025    ALKPHOS 101 03/16/2025    BILITOT 1.2 03/16/2025       LIPID PANEL -   Lab Results   Component Value Date    CHOL 213 (H) 03/16/2025    TRIG 71 03/16/2025    .7 03/16/2025    CHHDL 1.9 03/16/2025    LDLF 123 (H) 08/21/2023    VLDL 14 03/16/2025    NHDL 102 03/16/2025       RENAL FUNCTION PANEL -   Lab Results   Component Value Date    GLUCOSE 88 03/19/2025      03/19/2025    K 3.2 (L) 03/19/2025     03/19/2025    CO2 24 03/19/2025    ANIONGAP 19 03/19/2025    BUN 16 03/19/2025    CREATININE 0.83 03/19/2025    GFRMALE >90 08/21/2023    CALCIUM 9.5 03/19/2025    ALBUMIN 4.5 03/16/2025        Lab Results   Component Value Date    BNP 28 03/16/2025    HGBA1C 4.4 03/16/2025       Last Cardiology Tests:  04/23/2025 - Dobutamine Stress Echo  1. Adequate level of stress achieved.  2. No clinical, echocardiographic or electrocardiographic evidence for ischemia at maximal infusion.    03/19/2025 to 04/02/2025 - Holter Monitor  1. Predominant underlying rhythm was sinus rhythm; min HR 53 bpm, max  bpm, avg HR 78 bpm.  2. 10 supraventricular tachycardia runs occurred; fastest interval lasting 7 beats with max rate 218 bpm, longest lasting 13 beats with avg rate 133 bpm.  3. Isolated SVEs were rare, SVE couplets were rare, and SVE triplets were rare.  4. Isolated VEs were rare, VE couplets were rare, and no VE triplets were present.  5. Ventricular bigeminy and trigeminy were present.      03/18/2025 - Vascular Lab Carotid Artery Duplex    1. Right Carotid: Findings are consistent with less than 50% stenosis of the right proximal internal carotid artery. Laminar flow seen by color Doppler. Right external carotid artery appears patent with no evidence of stenosis. The right vertebral artery is patent with antegrade flow. No evidence of hemodynamically significant stenosis in the right subclavian artery.  2. Left Carotid: Findings are consistent with less than 50% stenosis of the left proximal internal carotid artery. Laminar flow seen by color Doppler. Left external carotid artery appears patent with no evidence of stenosis. The left vertebral artery is patent with antegrade flow. No evidence of hemodynamically significant stenosis in the left subclavian artery.    03/17/2025 - TTE  1. The left ventricular systolic function is normal, with a Shukla's biplane calculated  ejection fraction of 64%.  2. There is normal right ventricular global systolic function.  3. A bubble study using agitated saline was performed. Bubble study is positive.    Diagnostic review: I have personally reviewed the result(s) of the Dobutamine Stress Echo.    Assessment/Plan   1) CVA - PFO by Echo  On ASA 81 mg daily, Plavix 75 mg daily, atorvastatin 40 mg daily  Losartan previously discontinued s/t hypotension   Hospital admission 03/16/2025 to 03/19/2025 with stroke  TTE with LVEF 64%, normal RV systolic function, positive Bubble study  Carotid duplex with <50% stenosis bilaterally  CTA head/neck with heavy atherosclerotic calcifications through carotid siphons, suspected severe segmental stenosis cavernous segment right internal carotid artery, approximately 70% short-segment atherosclerotic stenosis of the right carotid bifurcation, severe short-segment stenosis distal right subclavian artery.  CVA seems to be d/t carotid artery disease  Do not suspect that CVA is related to PFO  Outpatient Holter with 10 runs of SVT  Dobutamine stress echo 04/23/2025 negative for ischemia   F/U as needed    2) SVT  Prior history of stroke in 12/2024, with recurrent stroke 03/2025  Outpatient Holter with 10 runs of SVT   F/U as needed    3) Carotid Artery Disease   Hospital admission 03/16/2025 to 03/19/2025 with stroke  Carotid duplex with <50% stenosis bilaterally  CTA head/neck with heavy atherosclerotic calcifications through carotid siphons, suspected severe segmental stenosis cavernous segment right internal carotid artery, approximately 70% short-segment atherosclerotic stenosis of the right carotid bifurcation, severe short-segment stenosis distal right subclavian artery.  CVA seems to be d/t carotid artery disease  Seen by Dr. Crooks 04/03/2025 - recommended proceeding with TCAR pending cardiac clearance  Dobutamine stress echo 04/23/2025 negative for ischemia   Low risk for planned procedure - clearance sent to  Dr. Daksha Gardiner Attestation  By signing my name below, I, Zuleyka Alcocer   attest that this documentation has been prepared under the direction and in the presence of Daniel Longoria MD.        [1]   Family History  Problem Relation Name Age of Onset    Cancer Mother      COPD Father

## 2025-04-23 NOTE — PROGRESS NOTES
Occupational Therapy                 Therapy Communication Note    Patient Name: Isaac Condon  MRN: [id number]  Today's Date: 4/23/2025     Discipline: Occupational Therapy    Missed Visit Reason:  Cancelled as pt had an emergency stress test today.    Missed Time: Cancel

## 2025-04-24 ENCOUNTER — OFFICE VISIT (OUTPATIENT)
Dept: CARDIOLOGY | Facility: HOSPITAL | Age: 70
End: 2025-04-24
Payer: MEDICARE

## 2025-04-24 VITALS
DIASTOLIC BLOOD PRESSURE: 58 MMHG | HEART RATE: 74 BPM | HEIGHT: 72 IN | OXYGEN SATURATION: 95 % | SYSTOLIC BLOOD PRESSURE: 98 MMHG | WEIGHT: 149 LBS | BODY MASS INDEX: 20.18 KG/M2

## 2025-04-24 DIAGNOSIS — I65.23 BILATERAL CAROTID ARTERY OCCLUSION: Primary | ICD-10-CM

## 2025-04-24 PROCEDURE — 1159F MED LIST DOCD IN RCRD: CPT | Performed by: INTERNAL MEDICINE

## 2025-04-24 PROCEDURE — 99212 OFFICE O/P EST SF 10 MIN: CPT | Performed by: INTERNAL MEDICINE

## 2025-04-24 PROCEDURE — 1036F TOBACCO NON-USER: CPT | Performed by: INTERNAL MEDICINE

## 2025-04-24 PROCEDURE — 1123F ACP DISCUSS/DSCN MKR DOCD: CPT | Performed by: INTERNAL MEDICINE

## 2025-04-24 PROCEDURE — 3008F BODY MASS INDEX DOCD: CPT | Performed by: INTERNAL MEDICINE

## 2025-04-24 NOTE — PATIENT INSTRUCTIONS
Continue all current medications as prescribed.  Followup with Dr. Longoria on an as needed basis.    If you have any questions or cardiac concerns, please call our office at [phone].

## 2025-04-24 NOTE — LETTER
April 24, 2025     Giles Finch MD  [address]  [facility], [address] [place] [id number]    Patient: Isaac Condon   YOB: 1955   Date of Visit: 4/24/2025       Dear Dr. Giles Finch MD:    Thank you for referring Isaac Condon to me for evaluation. Below are my notes for this consultation.  If you have questions, please do not hesitate to call me. I look forward to following your patient along with you.       Sincerely,     Daniel Longoria MD      CC: Rohit Crooks, DO  ______________________________________________________________________________________    Counseling:  The patient was counseled regarding diagnostic results, instructions for management, risk factor reductions, prognosis, patient and family education, impressions, risks and benefits of treatment options and importance of compliance with treatment.      Chief Complaint:   The patient presents today for 2-day followup of stress echo performed as part of preop workup prior to TCAR.     History Of Present Illness:    Isaac Condon is a 70 y.o. male patient who presents today for 2-day followup of stress echo performed as part of preop workup prior to TCAR. His PMH is significant for PFO, hyperlipidemia, carotid stenosis, alcohol abuse (in remission), and lung nodules. Dobutamine stress echo performed 04/23/2025 was negative for ischemia. Today, the patient states that he is feeling well with no new cardiac complaints.      Last Recorded Vitals:  Vitals:    04/24/25 1034   BP: 98/58   BP Location: Left arm   Patient Position: Sitting   BP Cuff Size: Adult   Pulse: 74   SpO2: 95%   Weight: 67.6 kg (149 lb)   Height: 1.829 m (6')       Past Surgical History:  He has a past surgical history that includes Cochlear implant and Foot surgery.      Social History:  He reports that he quit smoking about 3 months ago. His smoking use included cigarettes. He has been exposed to tobacco smoke. He has quit using  smokeless tobacco. He reports that he does not currently use alcohol after a past usage of about 1.0 standard drink of alcohol per week. He reports current drug use. Drug: Marijuana.    Family History:  Family History[1]     Allergies:  Patient has no known allergies.    Outpatient Medications:  Current Outpatient Medications   Medication Instructions   • albuterol 90 mcg/actuation inhaler 2 puffs, inhalation, Every 6 hours PRN   • aspirin 81 mg, oral, Daily   • atorvastatin (LIPITOR) 40 mg, oral, Nightly   • cholecalciferol (VITAMIN D-3) 2,000 Units, Daily   • clopidogrel (PLAVIX) 75 mg, oral, Daily   • melatonin 3 mg, Daily PRN   • multivitamin with minerals iron-free (Centrum Silver) 1 tablet, Daily   • sennosides-docusate sodium (Soledad-Colace) 8.6-50 mg tablet 1 tablet, oral, Nightly   • thiamine (VITAMIN B-1) 100 mg, Daily RT     Review of Systems   All other systems reviewed and are negative.     Physical Exam:  Constitutional:       Appearance: Healthy appearance. Not in distress.   Neck:      Vascular: No JVR. JVD normal.   Pulmonary:      Effort: Pulmonary effort is normal.      Breath sounds: Normal breath sounds. No wheezing. No rhonchi. No rales.   Chest:      Chest wall: Not tender to palpatation.   Cardiovascular:      PMI at left midclavicular line. Normal rate. Regular rhythm. Normal S1. Normal S2.       Murmurs: There is no murmur.      No gallop.  No click. No rub.   Pulses:     Intact distal pulses.   Edema:     Peripheral edema absent.   Abdominal:      General: Bowel sounds are normal.      Palpations: Abdomen is soft.      Tenderness: There is no abdominal tenderness.   Musculoskeletal: Normal range of motion.         General: No tenderness. Skin:     General: Skin is warm and dry.   Neurological:      General: No focal deficit present.      Mental Status: Alert and oriented to person, place and time.          Last Labs:  CBC -  Lab Results   Component Value Date    WBC 8.7 03/19/2025    HGB  16.9 03/19/2025    HCT 47.1 03/19/2025    MCV 95 03/19/2025     (L) 03/19/2025       CMP -  Lab Results   Component Value Date    CALCIUM 9.5 03/19/2025    PROT 7.2 03/16/2025    ALBUMIN 4.5 03/16/2025    AST 18 03/16/2025    ALT 16 03/16/2025    ALKPHOS 101 03/16/2025    BILITOT 1.2 03/16/2025       LIPID PANEL -   Lab Results   Component Value Date    CHOL 213 (H) 03/16/2025    TRIG 71 03/16/2025    .7 03/16/2025    CHHDL 1.9 03/16/2025    LDLF 123 (H) 08/21/2023    VLDL 14 03/16/2025    NHDL 102 03/16/2025       RENAL FUNCTION PANEL -   Lab Results   Component Value Date    GLUCOSE 88 03/19/2025     03/19/2025    K 3.2 (L) 03/19/2025     03/19/2025    CO2 24 03/19/2025    ANIONGAP 19 03/19/2025    BUN 16 03/19/2025    CREATININE 0.83 03/19/2025    GFRMALE >90 08/21/2023    CALCIUM 9.5 03/19/2025    ALBUMIN 4.5 03/16/2025        Lab Results   Component Value Date    BNP 28 03/16/2025    HGBA1C 4.4 03/16/2025       Last Cardiology Tests:  04/23/2025 - Dobutamine Stress Echo  1. Adequate level of stress achieved.  2. No clinical, echocardiographic or electrocardiographic evidence for ischemia at maximal infusion.    03/19/2025 to 04/02/2025 - Holter Monitor  1. Predominant underlying rhythm was sinus rhythm; min HR 53 bpm, max  bpm, avg HR 78 bpm.  2. 10 supraventricular tachycardia runs occurred; fastest interval lasting 7 beats with max rate 218 bpm, longest lasting 13 beats with avg rate 133 bpm.  3. Isolated SVEs were rare, SVE couplets were rare, and SVE triplets were rare.  4. Isolated VEs were rare, VE couplets were rare, and no VE triplets were present.  5. Ventricular bigeminy and trigeminy were present.      03/18/2025 - Vascular Lab Carotid Artery Duplex    1. Right Carotid: Findings are consistent with less than 50% stenosis of the right proximal internal carotid artery. Laminar flow seen by color Doppler. Right external carotid artery appears patent with no evidence of  stenosis. The right vertebral artery is patent with antegrade flow. No evidence of hemodynamically significant stenosis in the right subclavian artery.  2. Left Carotid: Findings are consistent with less than 50% stenosis of the left proximal internal carotid artery. Laminar flow seen by color Doppler. Left external carotid artery appears patent with no evidence of stenosis. The left vertebral artery is patent with antegrade flow. No evidence of hemodynamically significant stenosis in the left subclavian artery.    03/17/2025 - TTE  1. The left ventricular systolic function is normal, with a Shukla's biplane calculated ejection fraction of 64%.  2. There is normal right ventricular global systolic function.  3. A bubble study using agitated saline was performed. Bubble study is positive.    Diagnostic review: I have personally reviewed the result(s) of the Dobutamine Stress Echo.    Assessment/Plan  1) CVA - PFO by Echo  On ASA 81 mg daily, Plavix 75 mg daily, atorvastatin 40 mg daily  Losartan previously discontinued s/t hypotension   Hospital admission 03/16/2025 to 03/19/2025 with stroke  TTE with LVEF 64%, normal RV systolic function, positive Bubble study  Carotid duplex with <50% stenosis bilaterally  CTA head/neck with heavy atherosclerotic calcifications through carotid siphons, suspected severe segmental stenosis cavernous segment right internal carotid artery, approximately 70% short-segment atherosclerotic stenosis of the right carotid bifurcation, severe short-segment stenosis distal right subclavian artery.  CVA seems to be d/t carotid artery disease  Do not suspect that CVA is related to PFO  Outpatient Holter with 10 runs of SVT  Dobutamine stress echo 04/23/2025 negative for ischemia   F/U as needed    2) SVT  Prior history of stroke in 12/2024, with recurrent stroke 03/2025  Outpatient Holter with 10 runs of SVT   F/U as needed    3) Carotid Artery Disease   Hospital admission 03/16/2025 to  2025 with stroke  Carotid duplex with <50% stenosis bilaterally  CTA head/neck with heavy atherosclerotic calcifications through carotid siphons, suspected severe segmental stenosis cavernous segment right internal carotid artery, approximately 70% short-segment atherosclerotic stenosis of the right carotid bifurcation, severe short-segment stenosis distal right subclavian artery.  CVA seems to be d/t carotid artery disease  Seen by Dr. Crooks 2025 - recommended proceeding with TCAR pending cardiac clearance  Dobutamine stress echo 2025 negative for ischemia   Low risk for planned procedure - clearance sent to Dr. Daksha Gardiner Attestation  By signing my name below, I, Zuleyka Alcocer   attest that this documentation has been prepared under the direction and in the presence of Daniel Longoria MD.        [1]  Family History  Problem Relation Name Age of Onset   • Cancer Mother     • COPD Father                            [address] [address]                   Phone# [phone]              Fax# [phone]      Date: 25    RE: Isaac Condon            : 1955       Surgical/Procedural Clearance for:  TCAR  Patient is at: LOW cardiovascular risk for this INTERMEDIATE risk procedure.           Can hold Antiplatelet Clopidogrel (Plavix) for 3 days prior      N/A hold Anticoagulant                     Is further cardiac workup is needed prior to the procedure?  No     Patient should continue Beta Blocker in the perioperative period.  Yes     Patient should resume antiplatelet/anticoagulation as soon as cleared by surgeon/procedure physician.  Yes       Thank You,    25 at 10:41 AM - Daniel Longoria MD         [1]  Family History  Problem Relation Name Age of Onset   • Cancer Mother     • COPD Father

## 2025-04-25 ENCOUNTER — PATIENT OUTREACH (OUTPATIENT)
Dept: PRIMARY CARE | Facility: CLINIC | Age: 70
End: 2025-04-25
Payer: MEDICARE

## 2025-04-28 ENCOUNTER — TREATMENT (OUTPATIENT)
Dept: PHYSICAL THERAPY | Facility: CLINIC | Age: 70
End: 2025-04-28
Payer: MEDICARE

## 2025-04-28 ENCOUNTER — TREATMENT (OUTPATIENT)
Dept: OCCUPATIONAL THERAPY | Facility: CLINIC | Age: 70
End: 2025-04-28
Payer: MEDICARE

## 2025-04-28 DIAGNOSIS — I63.9 CEREBRAL INFARCTION, UNSPECIFIED MECHANISM (MULTI): Primary | ICD-10-CM

## 2025-04-28 DIAGNOSIS — R27.8 DECREASED COORDINATION: ICD-10-CM

## 2025-04-28 DIAGNOSIS — I63.9 CEREBRAL INFARCTION, UNSPECIFIED: ICD-10-CM

## 2025-04-28 PROCEDURE — 97110 THERAPEUTIC EXERCISES: CPT | Mod: GO

## 2025-04-28 PROCEDURE — 97110 THERAPEUTIC EXERCISES: CPT | Mod: GP

## 2025-04-28 PROCEDURE — 97530 THERAPEUTIC ACTIVITIES: CPT | Mod: GO

## 2025-04-28 ASSESSMENT — PAIN - FUNCTIONAL ASSESSMENT
PAIN_FUNCTIONAL_ASSESSMENT: 0-10
PAIN_FUNCTIONAL_ASSESSMENT: 0-10

## 2025-04-28 ASSESSMENT — PAIN SCALES - GENERAL
PAINLEVEL_OUTOF10: 0 - NO PAIN
PAINLEVEL_OUTOF10: 0 - NO PAIN

## 2025-04-28 NOTE — PROGRESS NOTES
Physical Therapy    Physical Therapy Treatment    Patient Name: Isaac Condon  MRN: [id number]  : 1955  Today's Date: 2025  Time Calculation  Start Time: 1400  Stop Time: 1445  Time Calculation (min): 45 min    PT Therapeutic Procedures Time Entry  Therapeutic Exercise Time Entry: 45          VISIT:# 5    Current Problem  Problem List Items Addressed This Visit    None  Visit Diagnoses         Codes      Cerebral infarction, unspecified     I63.9            Subjective    Patient reports experiencing muscle soreness following last treatment but did not last long.      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      Treatments:     EXERCISES Date 4/10/25 Date 25 Date 25 Date 25    #2 #3 #4 #5          Nustep L5 x 10' L5 x 10' L5 10' L5 10'          Shuttle   DLP 4B 3 x 10 4B 3 x 10 4B 3 x 10 4B 3 x 10   Shuttle   SLP        3B 3 x 10 each 3B 3 x 10 each  3B 3 x 10 each  3B 3 x 10 ea   Shuttle   TR/HR 3B x 20 3B x 30 3B 3 x 10  3B 3 x 10          Qhip Flex 10# x 20 each 20# x 20 each 20# x 30 each 20# x 30 each   Qhip Abd 10# x 20 each 20# x 20 each 20# x 30 each 20# x 30 each   Qhip Ext   20# x 20 20# x 30 each 20# x 30 each          Q Quad 20# 3 x 10 25# 3 x 10 25# x 40 25# x 40   Q Hamstring 35# 3 x 10 35# 3 x 10 35# x 50 35# x 40          Rockerboard X 20 each direction BUE support             Sit to stands  X 10 no UE support X 20                                                Assessment:   Patient reports experiencing no pain throughout treatment.  He needs verbal cueing and demonstration at times for proper completion of exercises.    Plan:    Progress strengthening program to improve balance and reduce fall risk.       Goals: (By week 6)  1) Patient will complete TUG <11 seconds to decrease fall risk    2) Increase KAILEE LE strength  to 5/5 to ease capability to ascend/descend steps and perform safe transfers    3) Increase core strength to >4/5 to improve posture with standing/lifting activities    4) Increase L SLS balance to >10 seconds for improving proprioceptive abilities with daily activities -progressing

## 2025-04-28 NOTE — PROGRESS NOTES
Occupational Therapy    OT Treatment    Patient Name: Isaac Condon  MRN: [id number]  Today's Date: 4/28/2025     Time Calculation  Start Time: 1345  Stop Time: 1430  Time Calculation (min): 45 min    Visit Number: 4  Therapeutic Procedures:      OT Therapeutic Procedures Time Entry  Therapeutic Activity Time Entry: 25  Therapeutic Exercise Time Entry: 20                         Current Problem:  1. Cerebral infarction, unspecified mechanism (Multi)        2. Cerebral infarction, unspecified  Follow Up In Occupational Therapy      3. Decreased coordination            Subjective     General: Pt states that he will be having a stent placement next week.   OT Received On: 04/28/25     Pain:  Pain Assessment  Pain Assessment: 0-10  0-10 (Numeric) Pain Score: 0 - No pain    Objective       Therapy/Activity:   Onset: 3/14/25       4/14/2025  4/28/2025     Exercises:      AAROM  Shoulder flexion, abduction, ER/IR, extension     1x10, 5 second holds  Passive stretch with moist heat-Table slides, abd, flex 10x each Pulleys - shoulder flexion, abduction 1 x10, 5 second isometric hold at top    Blue foam block Gross grasp, alt opp, key pinch, 3 pt pinch     1x10      Free Weights  Wrist curls- flex/ext 1x10-4#  RD/UD 1x10 1#  Pro/sup 1x10 1#  Wrist Roll Ups 4# 1x each 4 ways. Wrist roll ups each 4 ways 1x10-2#    Bicep curls  Hammer curls  Completed with 4#   1x10 each          Activities:      Bingo chips - [facility]      Card flip  7UP with L hand x 3  Flip on table, no slide and flip    Clothes pins Place clothespins with R IF tip pinch while WB on LUE.     L IF tip pinch to remove and place on bucket     *Yellow (5), red (4) green (6), blue (6) black (1) Place clothespins with  tip pinch alternating fingers  Harder clips with the index and middle, easier clips with the ring and little fingers.    Nuts and bolts Unscrewed/screwed large (2), medium (2), small (1) bolts from functional bin.     *1 drop, required additional time 5  out from box, then timed assembly/disassembly 5 bolt assembly 52sec  Disassembly 47 sec  5 back into box    Medication management    5 beads (x30) in L hand a time, in-hand manipulation, translation to place into medication organizer with large bottle and small bottle      HEP provided on Added extension, ER/IR with dowel tammy, hand strengthening with blue foam block to HEP. Handouts provided.  Encouraged 4- 15-20 min exercise/ activity sessions per day to work with strength and coordination tasks to help improve fine motor and strength.    Modalities:                              Manual:                              Functional review:       Completed on: 4/7/25   9 hole peg test          OP EDUCATION:  Education  Individual(s) Educated: Patient  Education Provided: Anatomy & Physiology, Diagnosis & Precautions, Risk and benefits of OT discussed with patient or other, POC discussed and agreed upon, Fall precautons  Home Program: AROM, AAROM, Activity modification, Strengthening, Handout issued    Assessment:   Pt participated in therapeutic exercises and activities as needed to progress LUE mobility, strength and coordination. Pt demonstrated difficulty with higher level coordination activity, frequently dropping beads with L hand. However, pt able to be upgraded with more reps during wrist roll up exercises with pt report of fatigue at end of session.   Plan:    Pt to continue addressing LUE strength, mobility and coordination in order to progress independence with I/ADLs      Goals:  Active       OT Goals       OT Goal 1       Start:  04/07/25    Expected End:  07/07/25       LTG - Patient will indicate/ demonstrate the ability to resume all preinjury ADLs and IADLs without significant limits secondary to decreased ROM, decreased strength and/or pain as indicated by Quickdash score of less than 5%.          OT Goal 2       Start:  04/07/25    Expected End:  07/07/25       Develop and issue HEP to help maximize ROM,  strength and tolerance to help maximize return to all pre-onset activities.          OT Goal 3       Start:  04/07/25    Expected End:  07/07/25       Pt will demonstrate increased LUE strength as appropriate with the LUE to be greater than or equal to 4/5 on MMT scale to help patient resume ADLs and IADLs.          OT Goal 4       Start:  04/07/25    Expected End:  07/07/25       Pt will demonstrate increased FMC in L hand as indicated by increased box-and-blocks score 2-5 blocks as compared to R hand.

## 2025-04-30 ENCOUNTER — TREATMENT (OUTPATIENT)
Dept: OCCUPATIONAL THERAPY | Facility: CLINIC | Age: 70
End: 2025-04-30
Payer: MEDICARE

## 2025-04-30 ENCOUNTER — TREATMENT (OUTPATIENT)
Dept: PHYSICAL THERAPY | Facility: CLINIC | Age: 70
End: 2025-04-30
Payer: MEDICARE

## 2025-04-30 DIAGNOSIS — I63.9 CEREBRAL INFARCTION, UNSPECIFIED: ICD-10-CM

## 2025-04-30 DIAGNOSIS — R27.8 DECREASED COORDINATION: ICD-10-CM

## 2025-04-30 DIAGNOSIS — I63.9 CEREBRAL INFARCTION, UNSPECIFIED MECHANISM (MULTI): Primary | ICD-10-CM

## 2025-04-30 PROCEDURE — 97110 THERAPEUTIC EXERCISES: CPT | Mod: GP,CQ | Performed by: PHYSICAL THERAPY ASSISTANT

## 2025-04-30 PROCEDURE — 97530 THERAPEUTIC ACTIVITIES: CPT | Mod: GO

## 2025-04-30 ASSESSMENT — PAIN SCALES - GENERAL
PAINLEVEL_OUTOF10: 0 - NO PAIN
PAINLEVEL_OUTOF10: 0 - NO PAIN

## 2025-04-30 ASSESSMENT — PAIN - FUNCTIONAL ASSESSMENT
PAIN_FUNCTIONAL_ASSESSMENT: 0-10
PAIN_FUNCTIONAL_ASSESSMENT: 0-10

## 2025-04-30 NOTE — PROGRESS NOTES
"Physical Therapy    Physical Therapy Treatment    Patient Name: Isaac Condon  MRN: [id number]  : 1955  Today's Date: 2025  Time Calculation  Start Time: 1100  Stop Time: 1140  Time Calculation (min): 40 min    PT Therapeutic Procedures Time Entry  Therapeutic Exercise Time Entry: 40          VISIT:# 6    Current Problem  Problem List Items Addressed This Visit    None  Visit Diagnoses         Codes      Cerebral infarction, unspecified     I63.9            Subjective    Patient reports no falls/no concerns this date.    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      Treatments:     EXERCISES Date 25 Date 25 Date  25    #4 #5 #6         Nustep L5 10' L5 10' L5 x 10'         Shuttle   DLP 4B 3 x 10 4B 3 x 10 5B 3 x 15   Shuttle   SLP        3B 3 x 10 each  3B 3 x 10 ea 4B 3 x 15 ea   Shuttle   TR/HR 3B 3 x 10  3B 3 x 10 4B 3 x 15         Qhip Flex 20# x 30 each 20# x 30 each 20# x 30 each   Qhip Abd 20# x 30 each 20# x 30 each 20# x 30 each   Qhip Ext  20# x 30 each 20# x 30 each 20# x 30 each         Q Quad 25# x 40 25# x 40 25# x 40   Q Hamstring 35# x 50 35# x 40 40# x 30         Rockerboard            Sit to stands X 20           Foam march   x20   Step ups FW/lateral   8\" x 15 each                          Assessment:   Patient reports experiencing no pain, only fatigue in LE's with advanced exercises. He needs verbal cueing and demonstration at times for proper completion of exercises.    Plan:    Progress strengthening program to improve balance and reduce fall risk.       Goals: (By week 6)  1) Patient will complete TUG <11 seconds to decrease fall risk    2) Increase KAILEE LE strength to 5/5 to ease capability to ascend/descend steps and perform safe transfers    3) Increase core strength to >4/5 to improve posture " with standing/lifting activities    4) Increase L SLS balance to >10 seconds for improving proprioceptive abilities with daily activities -progressing

## 2025-04-30 NOTE — PROGRESS NOTES
Occupational Therapy    OT Treatment    Patient Name: Isaac Condon  MRN: [id number]  Today's Date: 4/30/2025     Time Calculation  Start Time: 1145  Stop Time: 1230  Time Calculation (min): 45 min    Visit Number: 5  Therapeutic Procedures:      OT Therapeutic Procedures Time Entry  Therapeutic Activity Time Entry: 45                         Current Problem:  1. Cerebral infarction, unspecified mechanism (Multi)        2. Cerebral infarction, unspecified  Follow Up In Occupational Therapy      3. Decreased coordination            Subjective     General: Pt states that he uses a blue and red flexbar while at home.     OT Received On: 04/30/25     Pain:  Pain Assessment  Pain Assessment: 0-10  0-10 (Numeric) Pain Score: 0 - No pain    Objective       Therapy/Activity:   Onset: 3/14/25        4/28/2025   4/30/2025     Exercises:      AAROM  Passive stretch with moist heat-Table slides, abd, flex 10x each Pulleys - shoulder flexion, abduction 1 x10, 5 second isometric hold at top     Blue foam block      Free Weights Wrist curls- flex/ext 1x10-4#  RD/UD 1x10 1#  Pro/sup 1x10 1#  Wrist Roll Ups 4# 1x each 4 ways. Wrist roll ups each 4 ways 1x10-2#    Bicep curls  Hammer curls  Completed with 4#   1x10 each           Activities:      Bingo chips - [facility]      Card flip 7UP with L hand x 3  Flip on table, no slide and flip     Clothes pins Place clothespins with  tip pinch alternating fingers  Harder clips with the index and middle, easier clips with the ring and little fingers.     Nuts and bolts 5 out from box, then timed assembly/disassembly 5 bolt assembly 52sec  Disassembly 47 sec  5 back into box     Medication management   5 beads (x30) in L hand a time, in-hand manipulation, translation to place into medication organizer with large bottle and small bottle       Hand exerciser 30#   shoulder adduction, abduction, flexion   2x10   BUE   Red flexbar   Shoulder adduction/abduction, wrist flex/ext, pro/sup, RD/UD  1x10      Added to HEP.    Small pegs   Followed design with L hand pincer grasp.     Upgraded task for next design, using L hand alternating opposition    HEP provided on Encouraged 4- 15-20 min exercise/ activity sessions per day to work with strength and coordination tasks to help improve fine motor and strength.     Modalities:                              Manual:                              Functional review:       Completed on: 4/7/25  9 hole peg test       Access Code: [email or web address]  URL: [email or web address]  Date: 04/30/2025  Prepared by: Meryl Hoover    Exercises  - Shoulder Adduction with Resistance Bar  - 1 x daily - 7 x weekly - 3 sets - 10 reps  - Wrist Flexion and Extension with Resistance Bar  - 1 x daily - 7 x weekly - 3 sets - 10 reps  - Wrist Ulnar Deviation with Resistance Bar  - 1 x daily - 7 x weekly - 3 sets - 10 reps  - Wrist Radial Deviation with Resistance Bar  - 1 x daily - 7 x weekly - 3 sets - 10 reps  - Forearm Supination with Resistance Bar  - 1 x daily - 7 x weekly - 3 sets - 10 reps  - Forearm Pronation with Resistance Bar  - 1 x daily - 7 x weekly - 3 sets - 10 reps    OP EDUCATION:  Education  Individual(s) Educated: Patient  Education Provided: Anatomy & Physiology, Diagnosis & Precautions, Risk and benefits of OT discussed with patient or other, POC discussed and agreed upon, Fall precautons  Home Program: AROM, AAROM, Activity modification, Strengthening, Handout issued    Assessment:   Pt participated in therapeutic exercises and activities as needed to progress BUE strength and LUE coordination. Pt able to be upgraded during coordination task today. Instructed pt in flexbar exercises, added to HEP. Pt reported fatigue at end of session, no pain.     Plan:   Pt to continue addressing LUE strength, mobility and coordination in order to progress independence with I/ADLs      Goals:  Active       OT Goals       OT Goal 1       Start:  04/07/25    Expected End:   07/07/25       LTG - Patient will indicate/ demonstrate the ability to resume all preinjury ADLs and IADLs without significant limits secondary to decreased ROM, decreased strength and/or pain as indicated by Quickdash score of less than 5%.          OT Goal 2       Start:  04/07/25    Expected End:  07/07/25       Develop and issue HEP to help maximize ROM, strength and tolerance to help maximize return to all pre-onset activities.          OT Goal 3       Start:  04/07/25    Expected End:  07/07/25       Pt will demonstrate increased LUE strength as appropriate with the LUE to be greater than or equal to 4/5 on MMT scale to help patient resume ADLs and IADLs.          OT Goal 4       Start:  04/07/25    Expected End:  07/07/25       Pt will demonstrate increased FMC in L hand as indicated by increased box-and-blocks score 2-5 blocks as compared to R hand.

## 2025-05-01 ENCOUNTER — TELEPHONE (OUTPATIENT)
Dept: RADIOLOGY | Facility: HOSPITAL | Age: 70
End: 2025-05-01
Payer: MEDICARE

## 2025-05-05 ENCOUNTER — HOSPITAL ENCOUNTER (INPATIENT)
Facility: HOSPITAL | Age: 70
LOS: 2 days | Discharge: HOME | DRG: 035 | End: 2025-05-07
Attending: SURGERY | Admitting: SURGERY
Payer: MEDICARE

## 2025-05-05 ENCOUNTER — ANESTHESIA EVENT (OUTPATIENT)
Dept: OPERATING ROOM | Facility: HOSPITAL | Age: 70
DRG: 035 | End: 2025-05-05
Payer: MEDICARE

## 2025-05-05 ENCOUNTER — APPOINTMENT (OUTPATIENT)
Dept: PRIMARY CARE | Facility: CLINIC | Age: 70
End: 2025-05-05
Payer: MEDICARE

## 2025-05-05 ENCOUNTER — ANESTHESIA (OUTPATIENT)
Dept: OPERATING ROOM | Facility: HOSPITAL | Age: 70
DRG: 035 | End: 2025-05-05
Payer: MEDICARE

## 2025-05-05 ENCOUNTER — DOCUMENTATION (OUTPATIENT)
Dept: OCCUPATIONAL THERAPY | Facility: CLINIC | Age: 70
End: 2025-05-05

## 2025-05-05 ENCOUNTER — DOCUMENTATION (OUTPATIENT)
Dept: PHYSICAL THERAPY | Facility: CLINIC | Age: 70
End: 2025-05-05

## 2025-05-05 ENCOUNTER — APPOINTMENT (OUTPATIENT)
Dept: RADIOLOGY | Facility: HOSPITAL | Age: 70
DRG: 035 | End: 2025-05-05
Payer: MEDICARE

## 2025-05-05 DIAGNOSIS — I65.21 SYMPTOMATIC STENOSIS OF RIGHT CAROTID ARTERY: Primary | ICD-10-CM

## 2025-05-05 DIAGNOSIS — I65.23 BILATERAL CAROTID ARTERY STENOSIS: ICD-10-CM

## 2025-05-05 LAB
ABO GROUP (TYPE) IN BLOOD: NORMAL
ANTIBODY SCREEN: NORMAL
RH FACTOR (ANTIGEN D): NORMAL

## 2025-05-05 PROCEDURE — 51701 INSERT BLADDER CATHETER: CPT

## 2025-05-05 PROCEDURE — 2500000001 HC RX 250 WO HCPCS SELF ADMINISTERED DRUGS (ALT 637 FOR MEDICARE OP): Performed by: PHYSICIAN ASSISTANT

## 2025-05-05 PROCEDURE — 2550000001 HC RX 255 CONTRASTS: Mod: JW | Performed by: SURGERY

## 2025-05-05 PROCEDURE — 2500000004 HC RX 250 GENERAL PHARMACY W/ HCPCS (ALT 636 FOR OP/ED): Mod: JZ | Performed by: NURSE ANESTHETIST, CERTIFIED REGISTERED

## 2025-05-05 PROCEDURE — 2500000004 HC RX 250 GENERAL PHARMACY W/ HCPCS (ALT 636 FOR OP/ED): Mod: JZ | Performed by: PHYSICIAN ASSISTANT

## 2025-05-05 PROCEDURE — 2500000005 HC RX 250 GENERAL PHARMACY W/O HCPCS: Performed by: SURGERY

## 2025-05-05 PROCEDURE — A4649 SURGICAL SUPPLIES: HCPCS | Performed by: SURGERY

## 2025-05-05 PROCEDURE — 37215 TRANSCATH STENT CCA W/EPS: CPT | Performed by: SURGERY

## 2025-05-05 PROCEDURE — 2500000004 HC RX 250 GENERAL PHARMACY W/ HCPCS (ALT 636 FOR OP/ED): Mod: JZ | Performed by: SURGERY

## 2025-05-05 PROCEDURE — 96373 THER/PROPH/DIAG INJ IA: CPT | Performed by: SURGERY

## 2025-05-05 PROCEDURE — 7100000002 HC RECOVERY ROOM TIME - EACH INCREMENTAL 1 MINUTE: Performed by: SURGERY

## 2025-05-05 PROCEDURE — 3600000004 HC OR TIME - INITIAL BASE CHARGE - PROCEDURE LEVEL FOUR: Performed by: SURGERY

## 2025-05-05 PROCEDURE — 76937 US GUIDE VASCULAR ACCESS: CPT | Performed by: SURGERY

## 2025-05-05 PROCEDURE — 76000 FLUOROSCOPY <1 HR PHYS/QHP: CPT

## 2025-05-05 PROCEDURE — 3700000001 HC GENERAL ANESTHESIA TIME - INITIAL BASE CHARGE: Performed by: SURGERY

## 2025-05-05 PROCEDURE — 3700000002 HC GENERAL ANESTHESIA TIME - EACH INCREMENTAL 1 MINUTE: Performed by: SURGERY

## 2025-05-05 PROCEDURE — 86850 RBC ANTIBODY SCREEN: CPT | Performed by: ANESTHESIOLOGY

## 2025-05-05 PROCEDURE — 2020000001 HC ICU ROOM DAILY

## 2025-05-05 PROCEDURE — 2500000004 HC RX 250 GENERAL PHARMACY W/ HCPCS (ALT 636 FOR OP/ED): Mod: JZ | Performed by: ANESTHESIOLOGY

## 2025-05-05 PROCEDURE — C1769 GUIDE WIRE: HCPCS | Performed by: SURGERY

## 2025-05-05 PROCEDURE — 99221 1ST HOSP IP/OBS SF/LOW 40: CPT | Performed by: INTERNAL MEDICINE

## 2025-05-05 PROCEDURE — 2500000005 HC RX 250 GENERAL PHARMACY W/O HCPCS: Performed by: NURSE ANESTHETIST, CERTIFIED REGISTERED

## 2025-05-05 PROCEDURE — 85347 COAGULATION TIME ACTIVATED: CPT

## 2025-05-05 PROCEDURE — 2780000003 HC OR 278 NO HCPCS: Performed by: SURGERY

## 2025-05-05 PROCEDURE — 99221 1ST HOSP IP/OBS SF/LOW 40: CPT

## 2025-05-05 PROCEDURE — 2720000007 HC OR 272 NO HCPCS: Performed by: SURGERY

## 2025-05-05 PROCEDURE — C2625 STENT, NON-COR, TEM W/DEL SY: HCPCS | Performed by: SURGERY

## 2025-05-05 PROCEDURE — 2500000005 HC RX 250 GENERAL PHARMACY W/O HCPCS: Performed by: PHYSICIAN ASSISTANT

## 2025-05-05 PROCEDURE — 7100000001 HC RECOVERY ROOM TIME - INITIAL BASE CHARGE: Performed by: SURGERY

## 2025-05-05 PROCEDURE — C1884 EMBOLIZATION PROTECT SYST: HCPCS | Performed by: SURGERY

## 2025-05-05 PROCEDURE — X2AH336 CEREBRAL EMBOLIC FILTRATION, EXTRACORPOREAL FLOW REVERSAL CIRCUIT FROM RIGHT COMMON CAROTID ARTERY, PERCUTANEOUS APPROACH, NEW TECHNOLOGY GROUP 6: ICD-10-PCS | Performed by: SURGERY

## 2025-05-05 PROCEDURE — 037K3DZ DILATION OF RIGHT INTERNAL CAROTID ARTERY WITH INTRALUMINAL DEVICE, PERCUTANEOUS APPROACH: ICD-10-PCS | Performed by: SURGERY

## 2025-05-05 PROCEDURE — C1874 STENT, COATED/COV W/DEL SYS: HCPCS | Performed by: SURGERY

## 2025-05-05 PROCEDURE — 3600000009 HC OR TIME - EACH INCREMENTAL 1 MINUTE - PROCEDURE LEVEL FOUR: Performed by: SURGERY

## 2025-05-05 PROCEDURE — 36415 COLL VENOUS BLD VENIPUNCTURE: CPT | Performed by: ANESTHESIOLOGY

## 2025-05-05 DEVICE — IMPLANTABLE DEVICE: Type: IMPLANTABLE DEVICE | Site: CAROTID | Status: FUNCTIONAL

## 2025-05-05 RX ORDER — FENTANYL CITRATE 50 UG/ML
INJECTION, SOLUTION INTRAMUSCULAR; INTRAVENOUS AS NEEDED
Status: DISCONTINUED | OUTPATIENT
Start: 2025-05-05 | End: 2025-05-05

## 2025-05-05 RX ORDER — SODIUM CHLORIDE 9 MG/ML
INJECTION, SOLUTION INTRAVENOUS CONTINUOUS PRN
Status: DISCONTINUED | OUTPATIENT
Start: 2025-05-05 | End: 2025-05-05

## 2025-05-05 RX ORDER — SODIUM CHLORIDE, SODIUM LACTATE, POTASSIUM CHLORIDE, CALCIUM CHLORIDE 600; 310; 30; 20 MG/100ML; MG/100ML; MG/100ML; MG/100ML
100 INJECTION, SOLUTION INTRAVENOUS CONTINUOUS
Status: DISCONTINUED | OUTPATIENT
Start: 2025-05-05 | End: 2025-05-05 | Stop reason: HOSPADM

## 2025-05-05 RX ORDER — SODIUM CHLORIDE, SODIUM LACTATE, POTASSIUM CHLORIDE, CALCIUM CHLORIDE 600; 310; 30; 20 MG/100ML; MG/100ML; MG/100ML; MG/100ML
75 INJECTION, SOLUTION INTRAVENOUS CONTINUOUS
Status: ACTIVE | OUTPATIENT
Start: 2025-05-05 | End: 2025-05-05

## 2025-05-05 RX ORDER — PHENYLEPHRINE HYDROCHLORIDE 10 MG/ML
INJECTION INTRAVENOUS AS NEEDED
Status: DISCONTINUED | OUTPATIENT
Start: 2025-05-05 | End: 2025-05-05

## 2025-05-05 RX ORDER — PHENYLEPHRINE 10 MG/250 ML(40 MCG/ML)IN 0.9 % SOD.CHLORIDE INTRAVENOUS
0-2 CONTINUOUS
Status: DISCONTINUED | OUTPATIENT
Start: 2025-05-05 | End: 2025-05-05

## 2025-05-05 RX ORDER — LIDOCAINE HCL/PF 100 MG/5ML
SYRINGE (ML) INTRAVENOUS AS NEEDED
Status: DISCONTINUED | OUTPATIENT
Start: 2025-05-05 | End: 2025-05-05

## 2025-05-05 RX ORDER — FAMOTIDINE 10 MG/ML
20 INJECTION, SOLUTION INTRAVENOUS ONCE
Status: COMPLETED | OUTPATIENT
Start: 2025-05-05 | End: 2025-05-05

## 2025-05-05 RX ORDER — ALBUTEROL SULFATE 0.83 MG/ML
2.5 SOLUTION RESPIRATORY (INHALATION) ONCE AS NEEDED
Status: DISCONTINUED | OUTPATIENT
Start: 2025-05-05 | End: 2025-05-05 | Stop reason: HOSPADM

## 2025-05-05 RX ORDER — ONDANSETRON 4 MG/1
4 TABLET, ORALLY DISINTEGRATING ORAL EVERY 8 HOURS PRN
Status: DISCONTINUED | OUTPATIENT
Start: 2025-05-05 | End: 2025-05-07 | Stop reason: HOSPADM

## 2025-05-05 RX ORDER — OXYCODONE HYDROCHLORIDE 5 MG/1
5 TABLET ORAL EVERY 4 HOURS PRN
Status: DISCONTINUED | OUTPATIENT
Start: 2025-05-05 | End: 2025-05-07 | Stop reason: HOSPADM

## 2025-05-05 RX ORDER — MORPHINE SULFATE 2 MG/ML
2 INJECTION, SOLUTION INTRAMUSCULAR; INTRAVENOUS EVERY 5 MIN PRN
Status: DISCONTINUED | OUTPATIENT
Start: 2025-05-05 | End: 2025-05-05 | Stop reason: HOSPADM

## 2025-05-05 RX ORDER — ONDANSETRON HYDROCHLORIDE 2 MG/ML
INJECTION, SOLUTION INTRAVENOUS AS NEEDED
Status: DISCONTINUED | OUTPATIENT
Start: 2025-05-05 | End: 2025-05-05

## 2025-05-05 RX ORDER — NORETHINDRONE AND ETHINYL ESTRADIOL 0.5-0.035
KIT ORAL AS NEEDED
Status: DISCONTINUED | OUTPATIENT
Start: 2025-05-05 | End: 2025-05-05

## 2025-05-05 RX ORDER — ONDANSETRON HYDROCHLORIDE 2 MG/ML
4 INJECTION, SOLUTION INTRAVENOUS EVERY 8 HOURS PRN
Status: DISCONTINUED | OUTPATIENT
Start: 2025-05-05 | End: 2025-05-07 | Stop reason: HOSPADM

## 2025-05-05 RX ORDER — NICARDIPINE HYDROCHLORIDE 0.2 MG/ML
2.5-15 INJECTION INTRAVENOUS CONTINUOUS
Status: DISCONTINUED | OUTPATIENT
Start: 2025-05-05 | End: 2025-05-07 | Stop reason: HOSPADM

## 2025-05-05 RX ORDER — ROCURONIUM BROMIDE 10 MG/ML
INJECTION, SOLUTION INTRAVENOUS AS NEEDED
Status: DISCONTINUED | OUTPATIENT
Start: 2025-05-05 | End: 2025-05-05

## 2025-05-05 RX ORDER — ASPIRIN 81 MG/1
81 TABLET ORAL DAILY
Status: DISCONTINUED | OUTPATIENT
Start: 2025-05-06 | End: 2025-05-07 | Stop reason: HOSPADM

## 2025-05-05 RX ORDER — OXYCODONE AND ACETAMINOPHEN 5; 325 MG/1; MG/1
1 TABLET ORAL EVERY 4 HOURS PRN
Status: DISCONTINUED | OUTPATIENT
Start: 2025-05-05 | End: 2025-05-05 | Stop reason: HOSPADM

## 2025-05-05 RX ORDER — PROTAMINE SULFATE 10 MG/ML
INJECTION, SOLUTION INTRAVENOUS AS NEEDED
Status: DISCONTINUED | OUTPATIENT
Start: 2025-05-05 | End: 2025-05-05

## 2025-05-05 RX ORDER — ONDANSETRON HYDROCHLORIDE 2 MG/ML
4 INJECTION, SOLUTION INTRAVENOUS ONCE AS NEEDED
Status: DISCONTINUED | OUTPATIENT
Start: 2025-05-05 | End: 2025-05-05 | Stop reason: HOSPADM

## 2025-05-05 RX ORDER — TALC
3 POWDER (GRAM) TOPICAL NIGHTLY PRN
Status: DISCONTINUED | OUTPATIENT
Start: 2025-05-05 | End: 2025-05-07 | Stop reason: HOSPADM

## 2025-05-05 RX ORDER — LABETALOL HYDROCHLORIDE 5 MG/ML
5 INJECTION, SOLUTION INTRAVENOUS ONCE AS NEEDED
Status: DISCONTINUED | OUTPATIENT
Start: 2025-05-05 | End: 2025-05-05 | Stop reason: HOSPADM

## 2025-05-05 RX ORDER — DROPERIDOL 2.5 MG/ML
0.62 INJECTION, SOLUTION INTRAMUSCULAR; INTRAVENOUS ONCE AS NEEDED
Status: DISCONTINUED | OUTPATIENT
Start: 2025-05-05 | End: 2025-05-05 | Stop reason: HOSPADM

## 2025-05-05 RX ORDER — HYDRALAZINE HYDROCHLORIDE 20 MG/ML
5 INJECTION INTRAMUSCULAR; INTRAVENOUS EVERY 30 MIN PRN
Status: DISCONTINUED | OUTPATIENT
Start: 2025-05-05 | End: 2025-05-05 | Stop reason: HOSPADM

## 2025-05-05 RX ORDER — NEOSTIGMINE METHYLSULFATE 1 MG/ML
INJECTION INTRAVENOUS AS NEEDED
Status: DISCONTINUED | OUTPATIENT
Start: 2025-05-05 | End: 2025-05-05

## 2025-05-05 RX ORDER — GLYCOPYRROLATE 0.2 MG/ML
INJECTION INTRAMUSCULAR; INTRAVENOUS AS NEEDED
Status: DISCONTINUED | OUTPATIENT
Start: 2025-05-05 | End: 2025-05-05

## 2025-05-05 RX ORDER — CEFAZOLIN SODIUM 2 G/50ML
2 SOLUTION INTRAVENOUS ONCE
Status: COMPLETED | OUTPATIENT
Start: 2025-05-05 | End: 2025-05-05

## 2025-05-05 RX ORDER — HEPARIN SODIUM 5000 [USP'U]/ML
5000 INJECTION, SOLUTION INTRAVENOUS; SUBCUTANEOUS EVERY 8 HOURS
Status: DISCONTINUED | OUTPATIENT
Start: 2025-05-05 | End: 2025-05-07 | Stop reason: HOSPADM

## 2025-05-05 RX ORDER — PROPOFOL 10 MG/ML
INJECTION, EMULSION INTRAVENOUS AS NEEDED
Status: DISCONTINUED | OUTPATIENT
Start: 2025-05-05 | End: 2025-05-05

## 2025-05-05 RX ORDER — MEPERIDINE HYDROCHLORIDE 25 MG/ML
12.5 INJECTION INTRAMUSCULAR; INTRAVENOUS; SUBCUTANEOUS EVERY 10 MIN PRN
Status: DISCONTINUED | OUTPATIENT
Start: 2025-05-05 | End: 2025-05-05 | Stop reason: HOSPADM

## 2025-05-05 RX ORDER — CLOPIDOGREL BISULFATE 75 MG/1
75 TABLET ORAL DAILY
Status: DISCONTINUED | OUTPATIENT
Start: 2025-05-06 | End: 2025-05-07 | Stop reason: HOSPADM

## 2025-05-05 RX ORDER — MULTIVIT-MIN/IRON FUM/FOLIC AC 7.5 MG-4
1 TABLET ORAL DAILY
Status: DISCONTINUED | OUTPATIENT
Start: 2025-05-06 | End: 2025-05-07 | Stop reason: HOSPADM

## 2025-05-05 RX ORDER — CHOLECALCIFEROL (VITAMIN D3) 25 MCG
50 TABLET ORAL DAILY
Status: DISCONTINUED | OUTPATIENT
Start: 2025-05-06 | End: 2025-05-07 | Stop reason: HOSPADM

## 2025-05-05 RX ORDER — DIPHENHYDRAMINE HYDROCHLORIDE 50 MG/ML
12.5 INJECTION, SOLUTION INTRAMUSCULAR; INTRAVENOUS ONCE AS NEEDED
Status: DISCONTINUED | OUTPATIENT
Start: 2025-05-05 | End: 2025-05-05 | Stop reason: HOSPADM

## 2025-05-05 RX ORDER — CEFAZOLIN SODIUM 2 G/50ML
2 SOLUTION INTRAVENOUS EVERY 8 HOURS
Status: COMPLETED | OUTPATIENT
Start: 2025-05-05 | End: 2025-05-06

## 2025-05-05 RX ORDER — NALOXONE HYDROCHLORIDE 1 MG/ML
0.2 INJECTION INTRAMUSCULAR; INTRAVENOUS; SUBCUTANEOUS EVERY 5 MIN PRN
Status: DISCONTINUED | OUTPATIENT
Start: 2025-05-05 | End: 2025-05-07 | Stop reason: HOSPADM

## 2025-05-05 RX ORDER — ACETAMINOPHEN 325 MG/1
650 TABLET ORAL EVERY 6 HOURS SCHEDULED
Status: DISCONTINUED | OUTPATIENT
Start: 2025-05-05 | End: 2025-05-07 | Stop reason: HOSPADM

## 2025-05-05 RX ORDER — MIDAZOLAM HYDROCHLORIDE 1 MG/ML
INJECTION, SOLUTION INTRAMUSCULAR; INTRAVENOUS AS NEEDED
Status: DISCONTINUED | OUTPATIENT
Start: 2025-05-05 | End: 2025-05-05

## 2025-05-05 RX ORDER — MORPHINE SULFATE 2 MG/ML
2 INJECTION, SOLUTION INTRAMUSCULAR; INTRAVENOUS EVERY 2 HOUR PRN
Status: DISCONTINUED | OUTPATIENT
Start: 2025-05-05 | End: 2025-05-07 | Stop reason: HOSPADM

## 2025-05-05 RX ORDER — PHENYLEPHRINE 10 MG/250 ML(40 MCG/ML)IN 0.9 % SOD.CHLORIDE INTRAVENOUS
0-2 CONTINUOUS
Status: DISCONTINUED | OUTPATIENT
Start: 2025-05-05 | End: 2025-05-06

## 2025-05-05 RX ORDER — LIDOCAINE HYDROCHLORIDE 10 MG/ML
0.1 INJECTION, SOLUTION EPIDURAL; INFILTRATION; INTRACAUDAL; PERINEURAL ONCE
Status: DISCONTINUED | OUTPATIENT
Start: 2025-05-05 | End: 2025-05-05 | Stop reason: HOSPADM

## 2025-05-05 RX ORDER — ATORVASTATIN CALCIUM 40 MG/1
40 TABLET, FILM COATED ORAL NIGHTLY
Status: DISCONTINUED | OUTPATIENT
Start: 2025-05-05 | End: 2025-05-07 | Stop reason: HOSPADM

## 2025-05-05 RX ORDER — ALBUTEROL SULFATE 90 UG/1
2 INHALANT RESPIRATORY (INHALATION) EVERY 6 HOURS PRN
Status: DISCONTINUED | OUTPATIENT
Start: 2025-05-05 | End: 2025-05-07 | Stop reason: HOSPADM

## 2025-05-05 RX ORDER — AMOXICILLIN 250 MG
1 CAPSULE ORAL NIGHTLY
Status: DISCONTINUED | OUTPATIENT
Start: 2025-05-05 | End: 2025-05-07 | Stop reason: HOSPADM

## 2025-05-05 RX ORDER — IODIXANOL 270 MG/ML
INJECTION, SOLUTION INTRAVASCULAR AS NEEDED
Status: DISCONTINUED | OUTPATIENT
Start: 2025-05-05 | End: 2025-05-05 | Stop reason: HOSPADM

## 2025-05-05 RX ORDER — LANOLIN ALCOHOL/MO/W.PET/CERES
100 CREAM (GRAM) TOPICAL
Status: DISCONTINUED | OUTPATIENT
Start: 2025-05-06 | End: 2025-05-07 | Stop reason: HOSPADM

## 2025-05-05 RX ORDER — HEPARIN SODIUM 1000 [USP'U]/ML
INJECTION, SOLUTION INTRAVENOUS; SUBCUTANEOUS AS NEEDED
Status: DISCONTINUED | OUTPATIENT
Start: 2025-05-05 | End: 2025-05-05

## 2025-05-05 RX ADMIN — MIDAZOLAM 1 MG: 1 INJECTION INTRAMUSCULAR; INTRAVENOUS at 11:35

## 2025-05-05 RX ADMIN — PROPOFOL 50 MG: 10 INJECTION, EMULSION INTRAVENOUS at 11:59

## 2025-05-05 RX ADMIN — NEOSTIGMINE METHYLSULFATE 3 MG: 1 INJECTION INTRAVENOUS at 12:47

## 2025-05-05 RX ADMIN — ROCURONIUM BROMIDE 30 MG: 10 INJECTION, SOLUTION INTRAVENOUS at 11:40

## 2025-05-05 RX ADMIN — FAMOTIDINE 20 MG: 10 INJECTION, SOLUTION INTRAVENOUS at 09:44

## 2025-05-05 RX ADMIN — PROPOFOL 150 MG: 10 INJECTION, EMULSION INTRAVENOUS at 11:39

## 2025-05-05 RX ADMIN — FENTANYL CITRATE 50 MCG: 50 INJECTION INTRAMUSCULAR; INTRAVENOUS at 11:39

## 2025-05-05 RX ADMIN — CEFAZOLIN SODIUM 2 G: 2 SOLUTION INTRAVENOUS at 18:46

## 2025-05-05 RX ADMIN — EPHEDRINE SULFATE 10 MG: 50 INJECTION, SOLUTION INTRAVENOUS at 12:16

## 2025-05-05 RX ADMIN — HEPARIN SODIUM 5000 UNITS: 5000 INJECTION, SOLUTION INTRAVENOUS; SUBCUTANEOUS at 21:18

## 2025-05-05 RX ADMIN — DEXAMETHASONE SODIUM PHOSPHATE 4 MG: 4 INJECTION, SOLUTION INTRAMUSCULAR; INTRAVENOUS at 12:03

## 2025-05-05 RX ADMIN — ACETAMINOPHEN 650 MG: 325 TABLET ORAL at 18:46

## 2025-05-05 RX ADMIN — HEPARIN SODIUM 7000 UNITS: 1000 INJECTION INTRAVENOUS; SUBCUTANEOUS at 11:58

## 2025-05-05 RX ADMIN — CEFAZOLIN SODIUM 2 G: 2 SOLUTION INTRAVENOUS at 11:49

## 2025-05-05 RX ADMIN — FENTANYL CITRATE 50 MCG: 50 INJECTION INTRAMUSCULAR; INTRAVENOUS at 12:00

## 2025-05-05 RX ADMIN — ATORVASTATIN CALCIUM 40 MG: 40 TABLET, FILM COATED ORAL at 20:19

## 2025-05-05 RX ADMIN — SODIUM CHLORIDE: 9 INJECTION, SOLUTION INTRAVENOUS at 12:11

## 2025-05-05 RX ADMIN — Medication 0.5 MCG/KG/MIN: at 23:00

## 2025-05-05 RX ADMIN — SODIUM CHLORIDE, SODIUM LACTATE, POTASSIUM CHLORIDE, AND CALCIUM CHLORIDE 75 ML/HR: .6; .31; .03; .02 INJECTION, SOLUTION INTRAVENOUS at 15:35

## 2025-05-05 RX ADMIN — GLYCOPYRROLATE 0.4 MG: 0.2 INJECTION INTRAMUSCULAR; INTRAVENOUS at 12:47

## 2025-05-05 RX ADMIN — GLYCOPYRROLATE 0.2 MG: 0.2 INJECTION INTRAMUSCULAR; INTRAVENOUS at 12:22

## 2025-05-05 RX ADMIN — HYDROMORPHONE HYDROCHLORIDE 0.5 MG: 0.5 INJECTION, SOLUTION INTRAMUSCULAR; INTRAVENOUS; SUBCUTANEOUS at 13:24

## 2025-05-05 RX ADMIN — ONDANSETRON 4 MG: 2 INJECTION INTRAMUSCULAR; INTRAVENOUS at 19:33

## 2025-05-05 RX ADMIN — ONDANSETRON 4 MG: 2 INJECTION, SOLUTION INTRAMUSCULAR; INTRAVENOUS at 12:26

## 2025-05-05 RX ADMIN — LIDOCAINE HYDROCHLORIDE 100 MG: 20 INJECTION INTRAVENOUS at 11:39

## 2025-05-05 RX ADMIN — SODIUM CHLORIDE, SODIUM LACTATE, POTASSIUM CHLORIDE, AND CALCIUM CHLORIDE 75 ML/HR: .6; .31; .03; .02 INJECTION, SOLUTION INTRAVENOUS at 09:46

## 2025-05-05 RX ADMIN — Medication 21 PERCENT: at 20:00

## 2025-05-05 RX ADMIN — PROTAMINE SULFATE 25 MG: 10 INJECTION, SOLUTION INTRAVENOUS at 12:28

## 2025-05-05 RX ADMIN — PHENYLEPHRINE HYDROCHLORIDE 200 MCG: 10 INJECTION INTRAVENOUS at 12:13

## 2025-05-05 SDOH — ECONOMIC STABILITY: FOOD INSECURITY: HOW HARD IS IT FOR YOU TO PAY FOR THE VERY BASICS LIKE FOOD, HOUSING, MEDICAL CARE, AND HEATING?: NOT HARD AT ALL

## 2025-05-05 SDOH — ECONOMIC STABILITY: INCOME INSECURITY: IN THE PAST 12 MONTHS HAS THE ELECTRIC, GAS, OIL, OR WATER COMPANY THREATENED TO SHUT OFF SERVICES IN YOUR HOME?: NO

## 2025-05-05 SDOH — ECONOMIC STABILITY: FOOD INSECURITY: WITHIN THE PAST 12 MONTHS, YOU WORRIED THAT YOUR FOOD WOULD RUN OUT BEFORE YOU GOT THE MONEY TO BUY MORE.: NEVER TRUE

## 2025-05-05 SDOH — HEALTH STABILITY: MENTAL HEALTH: HOW OFTEN DO YOU HAVE A DRINK CONTAINING ALCOHOL?: NEVER

## 2025-05-05 SDOH — ECONOMIC STABILITY: FOOD INSECURITY: WITHIN THE PAST 12 MONTHS, THE FOOD YOU BOUGHT JUST DIDN'T LAST AND YOU DIDN'T HAVE MONEY TO GET MORE.: NEVER TRUE

## 2025-05-05 SDOH — HEALTH STABILITY: MENTAL HEALTH: HOW OFTEN DO YOU HAVE SIX OR MORE DRINKS ON ONE OCCASION?: NEVER

## 2025-05-05 SDOH — HEALTH STABILITY: MENTAL HEALTH: HOW MANY DRINKS CONTAINING ALCOHOL DO YOU HAVE ON A TYPICAL DAY WHEN YOU ARE DRINKING?: PATIENT DOES NOT DRINK

## 2025-05-05 SDOH — ECONOMIC STABILITY: HOUSING INSECURITY: IN THE LAST 12 MONTHS, WAS THERE A TIME WHEN YOU WERE NOT ABLE TO PAY THE MORTGAGE OR RENT ON TIME?: NO

## 2025-05-05 SDOH — ECONOMIC STABILITY: HOUSING INSECURITY: IN THE PAST 12 MONTHS, HOW MANY TIMES HAVE YOU MOVED WHERE YOU WERE LIVING?: 0

## 2025-05-05 SDOH — SOCIAL STABILITY: SOCIAL INSECURITY: WITHIN THE LAST YEAR, HAVE YOU BEEN HUMILIATED OR EMOTIONALLY ABUSED IN OTHER WAYS BY YOUR PARTNER OR EX-PARTNER?: NO

## 2025-05-05 SDOH — SOCIAL STABILITY: SOCIAL INSECURITY: WITHIN THE LAST YEAR, HAVE YOU BEEN AFRAID OF YOUR PARTNER OR EX-PARTNER?: NO

## 2025-05-05 SDOH — SOCIAL STABILITY: SOCIAL INSECURITY: ABUSE: ADULT

## 2025-05-05 SDOH — HEALTH STABILITY: MENTAL HEALTH: CURRENT SMOKER: 0

## 2025-05-05 SDOH — SOCIAL STABILITY: SOCIAL INSECURITY: ARE YOU OR HAVE YOU BEEN THREATENED OR ABUSED PHYSICALLY, EMOTIONALLY, OR SEXUALLY BY ANYONE?: NO

## 2025-05-05 SDOH — SOCIAL STABILITY: SOCIAL INSECURITY: DOES ANYONE TRY TO KEEP YOU FROM HAVING/CONTACTING OTHER FRIENDS OR DOING THINGS OUTSIDE YOUR HOME?: NO

## 2025-05-05 SDOH — ECONOMIC STABILITY: HOUSING INSECURITY: AT ANY TIME IN THE PAST 12 MONTHS, WERE YOU HOMELESS OR LIVING IN A SHELTER (INCLUDING NOW)?: NO

## 2025-05-05 SDOH — SOCIAL STABILITY: SOCIAL INSECURITY: HAVE YOU HAD ANY THOUGHTS OF HARMING ANYONE ELSE?: NO

## 2025-05-05 SDOH — SOCIAL STABILITY: SOCIAL INSECURITY: DO YOU FEEL UNSAFE GOING BACK TO THE PLACE WHERE YOU ARE LIVING?: NO

## 2025-05-05 SDOH — SOCIAL STABILITY: SOCIAL INSECURITY: HAS ANYONE EVER THREATENED TO HURT YOUR FAMILY OR YOUR PETS?: NO

## 2025-05-05 SDOH — ECONOMIC STABILITY: TRANSPORTATION INSECURITY: IN THE PAST 12 MONTHS, HAS LACK OF TRANSPORTATION KEPT YOU FROM MEDICAL APPOINTMENTS OR FROM GETTING MEDICATIONS?: NO

## 2025-05-05 SDOH — SOCIAL STABILITY: SOCIAL INSECURITY: DO YOU FEEL ANYONE HAS EXPLOITED OR TAKEN ADVANTAGE OF YOU FINANCIALLY OR OF YOUR PERSONAL PROPERTY?: NO

## 2025-05-05 SDOH — SOCIAL STABILITY: SOCIAL INSECURITY
ASK PARENT OR GUARDIAN: ARE THERE TIMES WHEN YOU, YOUR CHILD(REN), OR ANY MEMBER OF YOUR HOUSEHOLD FEEL UNSAFE, HARMED, OR THREATENED AROUND PERSONS WITH WHOM YOU KNOW OR LIVE?: NO

## 2025-05-05 SDOH — SOCIAL STABILITY: SOCIAL INSECURITY: ARE THERE ANY APPARENT SIGNS OF INJURIES/BEHAVIORS THAT COULD BE RELATED TO ABUSE/NEGLECT?: NO

## 2025-05-05 SDOH — SOCIAL STABILITY: SOCIAL INSECURITY: WERE YOU ABLE TO COMPLETE ALL THE BEHAVIORAL HEALTH SCREENINGS?: YES

## 2025-05-05 SDOH — ECONOMIC STABILITY: HOUSING INSECURITY: DO YOU FEEL UNSAFE GOING BACK TO THE PLACE WHERE YOU LIVE?: NO

## 2025-05-05 SDOH — SOCIAL STABILITY: SOCIAL INSECURITY: HAVE YOU HAD THOUGHTS OF HARMING ANYONE ELSE?: NO

## 2025-05-05 ASSESSMENT — LIFESTYLE VARIABLES
PAROXYSMAL SWEATS: NO SWEAT VISIBLE
ANXIETY: NO ANXIETY, AT EASE
ORIENTATION AND CLOUDING OF SENSORIUM: ORIENTED AND CAN DO SERIAL ADDITIONS
ANXIETY: NO ANXIETY, AT EASE
VISUAL DISTURBANCES: NOT PRESENT
HOW OFTEN DO YOU HAVE 6 OR MORE DRINKS ON ONE OCCASION: NEVER
NAUSEA AND VOMITING: NO NAUSEA AND NO VOMITING
AUDITORY DISTURBANCES: NOT PRESENT
HEADACHE, FULLNESS IN HEAD: NOT PRESENT
TREMOR: NO TREMOR
TOTAL SCORE: 0
NAUSEA AND VOMITING: NO NAUSEA AND NO VOMITING
TREMOR: NO TREMOR
AGITATION: NORMAL ACTIVITY
AUDIT-C TOTAL SCORE: 0
ORIENTATION AND CLOUDING OF SENSORIUM: ORIENTED AND CAN DO SERIAL ADDITIONS
HEADACHE, FULLNESS IN HEAD: NOT PRESENT
NAUSEA AND VOMITING: NO NAUSEA AND NO VOMITING
ORIENTATION AND CLOUDING OF SENSORIUM: ORIENTED AND CAN DO SERIAL ADDITIONS
TREMOR: NO TREMOR
AGITATION: NORMAL ACTIVITY
VISUAL DISTURBANCES: NOT PRESENT
SKIP TO QUESTIONS 9-10: 1
VISUAL DISTURBANCES: NOT PRESENT
AUDIT-C TOTAL SCORE: 0
VISUAL DISTURBANCES: NOT PRESENT
HEADACHE, FULLNESS IN HEAD: NOT PRESENT
TOTAL SCORE: 0
PAROXYSMAL SWEATS: NO SWEAT VISIBLE
AGITATION: NORMAL ACTIVITY
PAROXYSMAL SWEATS: NO SWEAT VISIBLE
PAROXYSMAL SWEATS: NO SWEAT VISIBLE
AGITATION: NORMAL ACTIVITY
ANXIETY: NO ANXIETY, AT EASE
NAUSEA AND VOMITING: NO NAUSEA AND NO VOMITING
VISUAL DISTURBANCES: NOT PRESENT
HEADACHE, FULLNESS IN HEAD: NOT PRESENT
AUDITORY DISTURBANCES: NOT PRESENT
PAROXYSMAL SWEATS: NO SWEAT VISIBLE
ANXIETY: NO ANXIETY, AT EASE
AUDITORY DISTURBANCES: NOT PRESENT
NAUSEA AND VOMITING: MILD NAUSEA WITH NO VOMITING
ORIENTATION AND CLOUDING OF SENSORIUM: ORIENTED AND CAN DO SERIAL ADDITIONS
TOTAL SCORE: 0
HEADACHE, FULLNESS IN HEAD: NOT PRESENT
SKIP TO QUESTIONS 9-10: 1
AGITATION: NORMAL ACTIVITY
TOTAL SCORE: 1
AUDITORY DISTURBANCES: NOT PRESENT
ANXIETY: NO ANXIETY, AT EASE
AUDITORY DISTURBANCES: NOT PRESENT
AUDIT-C TOTAL SCORE: 0
TOTAL SCORE: 0
HOW OFTEN DO YOU HAVE A DRINK CONTAINING ALCOHOL: NEVER
ORIENTATION AND CLOUDING OF SENSORIUM: ORIENTED AND CAN DO SERIAL ADDITIONS
HOW MANY STANDARD DRINKS CONTAINING ALCOHOL DO YOU HAVE ON A TYPICAL DAY: PATIENT DOES NOT DRINK

## 2025-05-05 ASSESSMENT — COGNITIVE AND FUNCTIONAL STATUS - GENERAL
PATIENT BASELINE BEDBOUND: NO
CLIMB 3 TO 5 STEPS WITH RAILING: A LITTLE
WALKING IN HOSPITAL ROOM: A LITTLE
DAILY ACTIVITIY SCORE: 22
HELP NEEDED FOR BATHING: A LITTLE
DRESSING REGULAR LOWER BODY CLOTHING: A LITTLE
MOBILITY SCORE: 22
MOBILITY SCORE: 24
DAILY ACTIVITIY SCORE: 24

## 2025-05-05 ASSESSMENT — PAIN - FUNCTIONAL ASSESSMENT
PAIN_FUNCTIONAL_ASSESSMENT: 0-10

## 2025-05-05 ASSESSMENT — PATIENT HEALTH QUESTIONNAIRE - PHQ9
2. FEELING DOWN, DEPRESSED OR HOPELESS: NOT AT ALL
SUM OF ALL RESPONSES TO PHQ9 QUESTIONS 1 & 2: 0
1. LITTLE INTEREST OR PLEASURE IN DOING THINGS: NOT AT ALL

## 2025-05-05 ASSESSMENT — PAIN SCALES - GENERAL
PAINLEVEL_OUTOF10: 5 - MODERATE PAIN
PAINLEVEL_OUTOF10: 5 - MODERATE PAIN
PAINLEVEL_OUTOF10: 6
PAINLEVEL_OUTOF10: 0 - NO PAIN
PAINLEVEL_OUTOF10: 6
PAINLEVEL_OUTOF10: 0 - NO PAIN
PAIN_LEVEL: 5
PAINLEVEL_OUTOF10: 5 - MODERATE PAIN
PAINLEVEL_OUTOF10: 0 - NO PAIN
PAINLEVEL_OUTOF10: 5 - MODERATE PAIN

## 2025-05-05 ASSESSMENT — PAIN DESCRIPTION - DESCRIPTORS: DESCRIPTORS: BURNING;PRESSURE

## 2025-05-05 ASSESSMENT — ACTIVITIES OF DAILY LIVING (ADL)
PATIENT'S MEMORY ADEQUATE TO SAFELY COMPLETE DAILY ACTIVITIES?: YES
HEARING - LEFT EAR: FUNCTIONAL
BATHING: INDEPENDENT
LACK_OF_TRANSPORTATION: NO
JUDGMENT_ADEQUATE_SAFELY_COMPLETE_DAILY_ACTIVITIES: YES
GROOMING: INDEPENDENT
DRESSING YOURSELF: INDEPENDENT
HEARING - RIGHT EAR: FUNCTIONAL
TOILETING: INDEPENDENT
FEEDING YOURSELF: INDEPENDENT
ADEQUATE_TO_COMPLETE_ADL: YES
WALKS IN HOME: INDEPENDENT

## 2025-05-05 ASSESSMENT — PAIN DESCRIPTION - LOCATION: LOCATION: INCISION

## 2025-05-05 ASSESSMENT — COLUMBIA-SUICIDE SEVERITY RATING SCALE - C-SSRS
1. IN THE PAST MONTH, HAVE YOU WISHED YOU WERE DEAD OR WISHED YOU COULD GO TO SLEEP AND NOT WAKE UP?: NO
6. HAVE YOU EVER DONE ANYTHING, STARTED TO DO ANYTHING, OR PREPARED TO DO ANYTHING TO END YOUR LIFE?: NO
2. HAVE YOU ACTUALLY HAD ANY THOUGHTS OF KILLING YOURSELF?: NO

## 2025-05-05 ASSESSMENT — PAIN DESCRIPTION - ORIENTATION: ORIENTATION: RIGHT

## 2025-05-05 NOTE — ANESTHESIA POSTPROCEDURE EVALUATION
Patient: Isaac Condon    Procedure Summary       Date: 05/05/25 Room / Location: POR OR 08 / Virtual POR OR; [facility]    Anesthesia Start: 1134 Anesthesia Stop: 1312    Procedures:       TRANSCAROTID ARTERY REVASCULARIZATION (TCAR), WITH INTRAOPERATIVE FLOW REVERSAL *sds* C-ARM (Right)      FOLLOW UP IN OCCUPATIONAL THERAPY Diagnosis:       Cerebral infarction, unspecified      (i65.210)    Scheduled Providers: Rohit Crooks DO; Meryl Hoover OT Responsible Provider: CAMMY Gonzalez    Anesthesia Type: general ASA Status: 3            Anesthesia Type: general    Vitals Value Taken Time   BP *** 05/05/25 13:13   Temp *** 05/05/25 13:13   Pulse *** 05/05/25 13:13   Resp *** 05/05/25 13:13   SpO2 *** 05/05/25 13:13       Anesthesia Post Evaluation    There were no known notable events for this encounter.

## 2025-05-05 NOTE — ANESTHESIA PROCEDURE NOTES
Airway  Date/Time: 5/5/2025 11:45 AM  Reason: elective    Airway not difficult    Staffing  Performed: CRNA   Authorized by: CAMMY Gonzalez    Performed by: CAMMY Gonzalez  Patient location during procedure: OR    Patient Condition  Indications for airway management: anesthesia  Patient position: sniffing  MILS maintained throughout  No planned trial extubation  Sedation level: deep     Final Airway Details   Preoxygenated: yes  Final airway type: endotracheal airway  Successful airway: ETT  Cuffed: yes   Successful intubation technique: direct laryngoscopy  Endotracheal tube insertion site: oral  Blade: Mcintosh  Blade size: #3  ETT size (mm): 7.5  Cormack-Lehane Classification: grade I - full view of glottis  Placement verified by: chest auscultation   Cuff volume (mL): 8  Measured from: lips  ETT to lips (cm): 21  Number of attempts at approach: 1  Number of other approaches attempted: 0

## 2025-05-05 NOTE — ANESTHESIA PROCEDURE NOTES
Arterial Line:    Date/Time: 5/5/2025 11:50 AM    Staffing  Performed: CRNA   Authorized by: CAMMY Gonzalez    Performed by: CAMMY Gonzalez    An arterial line was placed. in the OR for the following indication(s): continuous blood pressure monitoring.    A 20 gauge (size), 5 cm (length), Arrow (type) catheter was placed into the Right radial artery, secured by tape,   Seldinger technique used.

## 2025-05-05 NOTE — OP NOTE
TRANSCAROTID ARTERY REVASCULARIZATION (TCAR), WITH INTRAOPERATIVE FLOW REVERSAL *sds* C-ARM (R) Operative Note     Date: 2025  OR Location: POR OR    Name: Isaac Condon, : 1955, Age: 70 y.o., MRN: [id number], Sex: male    Diagnosis  * No Diagnosis Codes entered * * No Diagnosis Codes entered *     Procedures  TRANSCAROTID ARTERY REVASCULARIZATION (TCAR), WITH INTRAOPERATIVE FLOW REVERSAL *sds* C-ARM  58212 - [place] TCAT IV STENT CRV CRTD ART EMBOLIC PROTECJ      Surgeons      * Rohit Crooks - Primary    Resident/Fellow/Other Assistant:  Surgeons and Role:     * LILLY Shirley-C - MIRELA First Assist    Staff:   Circulator: Opal  Scrub Person: Karen  Scrub Person: Phuong Hebert Circulator: Suzanne    Anesthesia Staff: CRNA: IRIS Gonzalez-MARILEE    Procedure Summary  Anesthesia: General  ASA: III  Estimated Blood Loss: 25mL  Intra-op Medications:   Administrations occurring from 1015 to 1220 on 25:   Medication Name Total Dose   gelatin absorbable (Gelfoam) 100 sponge 1 each   thrombin-bovine (JMI) 5,000 unit topical solution 10,000 Units   heparin (porcine) 5,000 Units in sodium chloride 0.9% 500 mL irrigation 5,000 Units   dexAMETHasone (Decadron) 4 mg/mL IV Syringe 2 mL 4 mg   dexmedeTOMIDine (Precedex) bolus from bag 12 mcg   ePHEDrine injection 10 mg   fentaNYL (Sublimaze) injection 50 mcg/mL 100 mcg   heparin injection 1,000 units/mL 7,000 Units   lactated Ringer's infusion 97.92 mL   lidocaine (cardiac) injection 2% prefilled syringe 100 mg   midazolam (Versed) injection 1 mg/mL 1 mg   phenylephrine (Juwan-Synephrine) injection 200 mcg   propofol (Diprivan) injection 10 mg/mL 200 mg   rocuronium (ZeMuron) 50 mg/5 mL injection 30 mg   NaCl 0.9 % infusion 18.75 mL   ceFAZolin (Ancef) 2 g in dextrose (iso) IV 50 mL 2 g              Anesthesia Record               Intraprocedure I/O Totals          Intake    Dexmedetomidine 0.00 mL    The total shown is the total volume documented since Anesthesia  Start was filed.    Total Intake 0 mL          Specimen: No specimens collected              Drains and/or Catheters: * None in log *    Tourniquet Times:         Implants:     Findings: right carotid stenosis    Indications: Isaac Condon is an 70 y.o. male who is having surgery for i65.210. Symptomatic right carotid stenosis    The patient was seen in the preoperative area. The risks, benefits, complications, treatment options, non-operative alternatives, expected recovery and outcomes were discussed with the patient. The possibilities of reaction to medication, pulmonary aspiration, injury to surrounding structures, bleeding, recurrent infection, the need for additional procedures, failure to diagnose a condition, and creating a complication requiring transfusion or operation were discussed with the patient. The patient concurred with the proposed plan, giving informed consent.  The site of surgery was properly noted/marked if necessary per policy. The patient has been actively warmed in preoperative area. Preoperative antibiotics have been ordered and given within 1 hours of incision. Venous thrombosis prophylaxis have been ordered including bilateral sequential compression devices    Procedure Details:   Patient was brought to the OR suite placed in the supine position administered general anesthetic with endotracheal tube intubation bilateral groins and right neck and chest prepped and draped standard fashion.  Was utilized ultrasound guidance to access the left common femoral vein utilizing micro puncture technique.  Wire was advanced and subsequently was exchanged for an 035 wire and the en route venous sheath was advanced.  I aspirated and flushed with no difficulty.  Subsequently transverse supraclavicular neck incision was made subcutaneous dissection electrocautery as well as Metzenbaum scissors the proximal common carotid arteries identified circumvented with Vesseloops.  U-Stich was placed at the  proximal common carotid artery.  Patient is systemically heparinized.  Micropuncture needle was advanced with a specified 014 wire to the 3 centimeters.  Microsheath was advanced contrast arteriography was performed demonstrating the highly stenotic right* internal  artery.    Wire was subsequently advanced to the external carotid artery and subsequently the En route sheath was advanced after exchange with 0.035 J-tip wire and sutured to the chest wall in a standard fashion.  The En route embolic protection reversal device was attached in a standard fashion aspirated flushed and reverse flow noted and confirmed with saline flush.  TCAR timeout was undertaken.  With reversal of flow with CCA clamped the right internal carotid artery was engaged with the 014 wire advancing the wire to the carotid siphon.  The area of stenosis was pre-dilated with a 5 x 25 Abbott balloon and a 8 x 30  En route stent was placed and deployed in pin and pull manner.  Patient tolerated procedure well.  Completion images demonstrated excellent patency of the internal carotid artery as well as patency of the stent.  The En route sheath was removed and U stitch secured.  The venous sheath was removed compression is applied  excellent hemostasis maintained.  Protamine was administered.  Patient tolerated the procedure well was awoken neurovascular status was intact sent to PACU in stable condition.     Evidence of Infection: No   Complications:  None; patient tolerated the procedure well.    Disposition: PACU - hemodynamically stable.  Condition: stable               Additional Details: No qualified vascular fellow was available for assistance in the above-noted procedure.  LILLY Hutchison was available for the entirety of the procedure.  She assisted in all components of the procedure from start to completion.     Attending Attestation: I was present and scrubbed for the entire procedure.    Rohit Crooks  Phone Number: [phone]

## 2025-05-05 NOTE — CONSULTS
Critical Care Medicine Consult      Reason For Consult  Postoperative care    History Of Present Illness  Isaac Condon is a 70 y.o. male with underlying history of hypertension, dyslipidemia, COPD, cochlear implant, peripheral vascular disease with carotid stenosis with suspected acute ischemic stroke on 3/14/2025 with left facial droop.  He is currently in ICU after a schedule right TCAR.    Currently in ICU patient is resting comfortably he denies any shortness of breath or chest discomfort.  Blood pressure stable 130 systolic    His chart were reviewed  Discussed with vascular team earlier today    Medical History[1]  Surgical History[2]  Prescriptions Prior to Admission[3]  Patient has no known allergies.  Social History[4]  Family History[5]    Scheduled Medications:   Scheduled Medications[6]     Continuous Medications:   Continuous Medications[7]     PRN Medications:   PRN Medications[8]    Review of Systems:  12 review of system is negative other than stated above    Objective   Vitals:  Most Recent:  Vitals:    05/05/25 1500   BP: 127/80   Pulse: 53   Resp: (!) 7   Temp:    SpO2: 95%       24hr Min/Max:  Temp  Min: 36.2 °C (97.1 °F)  Max: 36.2 °C (97.2 °F)  Pulse  Min: 53  Max: 68  BP  Min: 119/76  Max: 133/82  Resp  Min: 7  Max: 14  SpO2  Min: 95 %  Max: 100 %    LDA:   Arterial Line 05/05/25 Right Radial (Active)   Placement Date/Time: 05/05/25 (c) 1150   Size: 20 G  Orientation: Right  Location: Radial  Securement Method: Taped   Number of days: 0         Vent settings:       Hemodynamic parameters for last 24 hours:         Intake/Output Summary (Last 24 hours) at 5/5/2025 1523  Last data filed at 5/5/2025 1424  Gross per 24 hour   Intake 1500 ml   Output 25 ml   Net 1475 ml           Physical exam:    Physical Exam  Vitals and nursing note reviewed.   Constitutional:       Appearance: Normal appearance.   HENT:      Head: Normocephalic and atraumatic.      Mouth/Throat:      Mouth: Mucous membranes  are dry.   Cardiovascular:      Rate and Rhythm: Normal rate and regular rhythm.      Pulses: Normal pulses.      Heart sounds: Normal heart sounds.   Pulmonary:      Effort: Pulmonary effort is normal.      Breath sounds: Normal breath sounds.   Abdominal:      General: Abdomen is flat.      Palpations: Abdomen is soft.   Musculoskeletal:         General: No swelling.   Skin:     General: Skin is dry.      Capillary Refill: Capillary refill takes 2 to 3 seconds.   Neurological:      General: No focal deficit present.      Mental Status: He is alert and oriented to person, place, and time.   Psychiatric:         Mood and Affect: Mood normal.         Behavior: Behavior normal.          Lab/Radiology/Diagnostic Review:  Results for orders placed or performed during the hospital encounter of 05/05/25 (from the past 24 hours)   Type and screen   Result Value Ref Range    ABO TYPE O     Rh TYPE POS     ANTIBODY SCREEN NEG      Imaging  No results found.    Cardiology, Vascular, and Other Imaging  FL less than 1 hour  Result Date: 5/5/2025  These images are not reportable by radiology and will not be interpreted by  Radiologists.        Assessment/Plan   Assessment & Plan  Symptomatic stenosis of right carotid artery    Pleasant 70-year-old male with underlying history of hypertension, dyslipidemia, COPD, cochlear implant, peripheral vascular disease with carotid stenosis with suspected acute ischemic stroke on 3/14/2025 with left facial droop.  He is currently in ICU after a schedule right TCAR.    Neuro   - Alert responsive answering question appropriately    Respiratory   - Saturation well on room air    Cardiac/hemodynamics   - Arterial line in place   - Blood pressure currently 130s systolic   - BP goal post TCAR 120-160 mmHg systolic   - Use Cardene or phenylephrine to achieve this   - Some IV fluids post TCAR, there is already a stop date for this   - Prophylactic Ancef for antibiotics    Renal   - Await  labs    GI   - Diet ordered, advance as tolerates    MSK   - Bedrest postprocedure    Code Status: Full  Luis Manuel Grover MD    I spent 35. minutes of cumulative critical care time with the patient.  Greater than 50% of that time was spent in the direct collaboration and or coordination of care of the patient.     Dragon dictation software was used to dictate this note and thus there may be minor errors in translation/transcription including garbled speech or misspellings. Please contact for clarification if needed.                 [1]   Past Medical History:  Diagnosis Date    Bronchitis     COPD (chronic obstructive pulmonary disease) (Multi)     HTN (hypertension)     Hyperlipidemia     Vasovagal syncope 01/28/2025   [2]   Past Surgical History:  Procedure Laterality Date    COCHLEAR IMPLANT      FOOT SURGERY      toe fracture repair   [3]   Medications Prior to Admission   Medication Sig Dispense Refill Last Dose/Taking    aspirin 81 mg EC tablet Take 1 tablet (81 mg) by mouth once daily.   5/5/2025 Morning    atorvastatin (Lipitor) 40 mg tablet Take 1 tablet (40 mg) by mouth once daily at bedtime. 30 tablet 1 5/4/2025    cholecalciferol (Vitamin D-3) 50 MCG (2000 UT) tablet Take 1 tablet (2,000 Units) by mouth once daily.   5/4/2025    clopidogrel (Plavix) 75 mg tablet Take 1 tablet (75 mg) by mouth once daily. 30 tablet 1 5/5/2025 Morning    melatonin 3 mg tablet Take 1 tablet (3 mg) by mouth once daily as needed.   5/4/2025    multivitamin with minerals iron-free (Centrum Silver) Take 1 tablet by mouth once daily.   5/4/2025    sennosides-docusate sodium (Soledad-Colace) 8.6-50 mg tablet Take 1 tablet by mouth once daily at bedtime.   5/4/2025    thiamine 100 mg tablet Take 1 tablet (100 mg) by mouth once daily.   5/4/2025    albuterol 90 mcg/actuation inhaler Inhale 2 puffs every 6 hours if needed for wheezing. 18 g 5 Unknown   [4]   Social History  Tobacco Use    Smoking status: Former     Current packs/day:  0.00     Types: Cigarettes     Quit date:      Years since quittin.3     Passive exposure: Current    Smokeless tobacco: Former   Vaping Use    Vaping status: Never Used   Substance Use Topics    Alcohol use: Not Currently     Alcohol/week: 1.0 standard drink of alcohol     Types: 1 Cans of beer per week     Comment: socially    Drug use: Not Currently     Types: Marijuana     Comment: pt states he stopped smoking marijuana   [5]   Family History  Problem Relation Name Age of Onset    Cancer Mother      COPD Father     [6] acetaminophen, 650 mg, oral, q6h CAIO  [START ON 2025] aspirin, 81 mg, oral, Daily  atorvastatin, 40 mg, oral, Nightly  ceFAZolin, 2 g, intravenous, q8h  [START ON 2025] cholecalciferol, 50 mcg, oral, Daily  [START ON 2025] clopidogrel, 75 mg, oral, Daily  heparin (porcine), 5,000 Units, subcutaneous, q8h  [START ON 2025] multivitamin with minerals, 1 tablet, oral, Daily  oxygen, , inhalation, Continuous - Inhalation  sennosides-docusate sodium, 1 tablet, oral, Nightly  [START ON 2025] thiamine, 100 mg, oral, Daily  [7] lactated Ringer's, 75 mL/hr, Last Rate: 75 mL/hr (25 1134)  lactated Ringer's, 75 mL/hr  niCARdipine, 2.5-15 mg/hr  phenylephrine, 0-2 mcg/kg/min  [8] PRN medications: albuterol, melatonin, morphine, naloxone, ondansetron ODT **OR** ondansetron, oxyCODONE

## 2025-05-05 NOTE — PROGRESS NOTES
Isaac Condon is a 70 y.o. male admitted for Symptomatic stenosis of right carotid artery. Pharmacy reviewed the patient's [id number] medications and allergies for accuracy.    The list below reflects the PTA list prior to pharmacy medication history. A summary a changes to the PTA medication list has been listed below. Please review each medication in order reconciliation for additional clarification and justification.    Source of information: t2p     Medications added:  Coalce 100mg- 1 every day     Medications modified:  Melatonin 3mg --> 5mg 1 every day     Medications to be removed:    Medications of concern:      Prior to Admission Medications   Prescriptions Last Dose Informant Patient Reported? Taking?   albuterol 90 mcg/actuation inhaler Unknown  No No   Sig: Inhale 2 puffs every 6 hours if needed for wheezing.   aspirin 81 mg EC tablet 5/5/2025 Morning  No Yes   Sig: Take 1 tablet (81 mg) by mouth once daily.   atorvastatin (Lipitor) 40 mg tablet 5/4/2025  No Yes   Sig: Take 1 tablet (40 mg) by mouth once daily at bedtime.   cholecalciferol (Vitamin D-3) 50 MCG (2000 UT) tablet 5/4/2025  Yes Yes   Sig: Take 1 tablet (2,000 Units) by mouth once daily.   clopidogrel (Plavix) 75 mg tablet 5/5/2025 Morning  No Yes   Sig: Take 1 tablet (75 mg) by mouth once daily.   melatonin 3 mg tablet 5/4/2025  Yes Yes   Sig: Take 1 tablet (3 mg) by mouth once daily as needed.   multivitamin with minerals iron-free (Centrum Silver) 5/4/2025  Yes Yes   Sig: Take 1 tablet by mouth once daily.   sennosides-docusate sodium (Soledad-Colace) 8.6-50 mg tablet 5/4/2025  No Yes   Sig: Take 1 tablet by mouth once daily at bedtime.   thiamine 100 mg tablet 5/4/2025  Yes Yes   Sig: Take 1 tablet (100 mg) by mouth once daily.      Facility-Administered Medications: None       ÁLVARO ROCHE

## 2025-05-05 NOTE — PROGRESS NOTES
Occupational Therapy                 Therapy Communication Note    Patient Name: Isaac Condon  MRN: [id number]  Today's Date: 5/5/2025     Discipline: Occupational Therapy    Missed Visit Reason:  Per chart review, pt in vascular surgery. Will monitor chart for updates.     Missed Time: No Show

## 2025-05-05 NOTE — PROGRESS NOTES
Physical Therapy                 Therapy Communication Note    Patient Name: Isaac Condon  MRN: [id number]  Department: [facility] OR  Room: Room/bed info not found  Today's Date: 5/5/2025     Discipline: Physical Therapy            Missed Time: No Show

## 2025-05-05 NOTE — ANESTHESIA POSTPROCEDURE EVALUATION
Patient: Isaac Condon    Procedure Summary       Date: 05/05/25 Room / Location: [facility] OR 08 / [facility] OR; [facility]    Anesthesia Start: 1134 Anesthesia Stop: 1312    Procedures:       TRANSCAROTID ARTERY REVASCULARIZATION (TCAR), WITH INTRAOPERATIVE FLOW REVERSAL *sds* C-ARM (Right)      FOLLOW UP IN OCCUPATIONAL THERAPY Diagnosis:       Cerebral infarction, unspecified      (i65.210)    Scheduled Providers: Rohit Crooks DO; Meryl Hoover OT Responsible Provider: CAMMY Gonzalez    Anesthesia Type: general ASA Status: 3            Anesthesia Type: general    Vitals Value Taken Time   /75 05/05/25 14:20   Temp 36.2 °C (97.1 °F) 05/05/25 14:00   Pulse 62 05/05/25 14:25   Resp 12 05/05/25 14:10   SpO2 98 % 05/05/25 14:25   Vitals shown include unfiled device data.    Anesthesia Post Evaluation    Patient location during evaluation: PACU  Patient participation: complete - patient participated  Level of consciousness: awake  Pain score: 5  Pain management: adequate  Airway patency: patent  Cardiovascular status: acceptable  Respiratory status: acceptable  Hydration status: acceptable  Postoperative Nausea and Vomiting: none        There were no known notable events for this encounter.

## 2025-05-05 NOTE — CONSULTS
"Inpatient consult to Medicine  Consult performed by: Evan Farias PA-C  Consult ordered by: Jessica Molina PA-C  Reason for consult: s/p right TCAR, postoperative medical management        [facility] GENERAL MEDICINE CONSULTATION NOTE    ASSESSMENT AND PLAN:     Isaac Condon is a 70 y.o. male with past medical history s/f PFO, hyperlipidemia, COPD, carotid stenosis with suspected acute ischemic stroke on 3/14/2025 with left facial droop, alcohol abuse (in remission), and lung nodules presenting after right TCAR. Medicine was consulted for medical management.     Right carotid artery stenosis s/p TCAR by Dr. Crooks on 5/5/25  -managed by primary care team  --140s with arterial line in place, BP goal post TCAR 120-160 mmHg systolic  -Continue on nicardipine and phenylephrine with goal parameters  -Continue Ancef as surgical prophylactic  -Pain control and antiemetics PRN    Hx of CVA, PFO by echo  -Continue home aspirin, Plavix, atorvastatin    Other:   -I have reviewed the patient's chart and documentation/notes from Cardiology in the inpatient and/or outpatient setting   -I have reviewed the patient's most recent lab values in the chart (greater detail below)  -I have reconciled the patient's outside information and chart during this consultation, including: allergies, medications (confirmed at bedside as well), prior diagnoses, and immunizations.     Care / Plan Discussed With: Patient; Patient's nurse    Evan Farias PA-C  General Inpatient Medicine (\"[facility]\") / Hospitalist Service  Available via Loot!t [phone]. Outside of these hours, please contact providers assigned to the team and/or via the Medicine Acute Pager.  Thank you for including the hospitalist service in the care of your patient. We will continue to follow while they remain admitted.     I spent a total of 35 minutes in the overall professional care of this patient on this date of service.    Dragon dictation software was used to " dictate this note and thus there may be minor errors in translation/transcription including garbled speech or misspellings. Please contact for clarification if needed.    HISTORY OF PRESENT ILLNESS:     Primary Source: Patient    History Of Present Illness:    Isaac Condon is a 70 y.o. male with a significant past medical history of PFO, hyperlipidemia, COPD, carotid stenosis with suspected acute ischemic stroke on 3/14/2025 with left facial droop, alcohol abuse (in remission), and lung nodules presenting after right TCAR.  Currently patient is resting comfortably. Tolerated eating jello, swallowed okay early in the day.  During interview, he felt nauseous and had an episode of NBNB emesis. Denies cp/pressure, palpitations, diaphoresis, SOB, KENDRICK, dizziness / lightheadedness, syncope or near syncope, HA, vision changes, f/c/d/abd pain. Patient is aware of hospitalist service presence in-house overnight if questions, concerns, or any changes in condition arise. All questions answered to the best of my ability.     Review of Systems:   I performed a complete 12 point review of systems and it is negative except as noted in HPI or above. All other systems have been reviewed and are negative.    PAST HISTORIES:     Past Medical History:  He has a past medical history of Bronchitis, COPD (chronic obstructive pulmonary disease) (Multi), HTN (hypertension), Hyperlipidemia, and Vasovagal syncope (01/28/2025).    Past Surgical History:  He has a past surgical history that includes Cochlear implant and Foot surgery.      Social History:  He reports that he quit smoking about 4 months ago. His smoking use included cigarettes. He has been exposed to tobacco smoke. He has quit using smokeless tobacco. He reports that he does not currently use alcohol after a past usage of about 1.0 standard drink of alcohol per week. He reports that he does not currently use drugs after having used the following drugs: Marijuana.    Family  History:  Family History[1]    Allergies:  Patient has no known allergies.    OBJECTIVE:     Last Recorded Vitals:  Vitals:    05/05/25 1542 05/05/25 1600 05/05/25 1700 05/05/25 1800   BP:  135/88 133/80 128/85   Pulse:  57 56 68   Resp:  (!) 8 (!) 8 11   Temp: 35.9 °C (96.6 °F)      TempSrc: Temporal      SpO2:  97% 98% 100%   Weight:       Height:         /85   Pulse 68   Temp 35.9 °C (96.6 °F) (Temporal)   Resp 11   Ht 1.829 m (6')   Wt 68.9 kg (151 lb 14.4 oz)   SpO2 100%   BMI 20.60 kg/m²      Physical Exam:  Vital signs and nursing notes reviewed.   Constitutional: Pleasant and cooperative. Laying in bed in no acute distress. Conversant.   Skin: Warm and dry; site c/d/I, did not remove dressing, no overt bleeding or drainage.  Eyes: EOMI. Anicteric sclera.   ENT: Mucous membranes moist; no obvious injury or deformity appreciated.   Head and Neck: Normocephalic, atraumatic. ROM preserved. Trachea midline. No appreciable JVD.   Respiratory: Nonlabored on RA. Lungs clear to auscultation bilaterally without obvious adventitious sounds. Chest rise is equal.  Cardiovascular: RRR. No gross murmur, gallop, or rub. Extremities are warm and well-perfused.   Chest Wall: No chest wall tenderness.   Gastrointestinal: Abdomen soft, nontender, nondistended. No obvious organomegaly appreciated. Bowel sounds are present.  Genitourinary: No CVA tenderness.   Musculoskeletal: No gross abnormalities appreciated. No limitations to AROM/PROM appreciated.   Extremities: No cyanosis, edema, or clubbing evident. Neurovascularly intact.   Neurologic: A&Ox3. CN 2-12 grossly intact. Able to respond to questions appropriately and clearly. No acute focal neurologic deficits appreciated.  Psychiatric: Appropriate mood and behavior.    MEDICATIONS:     Inpatient Medications:  Scheduled Medications[2]  PRN Medications[3]    Outpatient Medications:  Prior to Admission medications    Medication Sig Start Date End Date Taking?  Authorizing Provider   aspirin 81 mg EC tablet Take 1 tablet (81 mg) by mouth once daily. 3/20/25  Yes ROSA ELENA Castellon   atorvastatin (Lipitor) 40 mg tablet Take 1 tablet (40 mg) by mouth once daily at bedtime. 3/19/25  Yes ROSA ELENA Castellon   cholecalciferol (Vitamin D-3) 50 MCG (2000 UT) tablet Take 1 tablet (2,000 Units) by mouth once daily.   Yes Historical Provider, MD   clopidogrel (Plavix) 75 mg tablet Take 1 tablet (75 mg) by mouth once daily. 3/19/25  Yes ROSA ELENA Castellon   melatonin 5 mg tablet Take 1 tablet (5 mg) by mouth once daily at bedtime. 3/28/25  Yes Historical Provider, MD   multivitamin with minerals iron-free (Centrum Silver) Take 1 tablet by mouth once daily.   Yes Historical Provider, MD   thiamine 100 mg tablet Take 1 tablet (100 mg) by mouth once daily. 3/29/25  Yes Historical Provider, MD   albuterol 90 mcg/actuation inhaler Inhale 2 puffs every 6 hours if needed for wheezing. 1/28/25 1/28/26  Giles Finch MD   docusate sodium (Colace) 50 mg capsule Take 1 capsule (50 mg) by mouth once daily.    Historical Provider, MD   sennosides-docusate sodium (Soledad-Colace) 8.6-50 mg tablet Take 1 tablet by mouth once daily at bedtime.  Patient not taking: Reported on 5/5/2025 3/19/25   ROSA ELENA Castellon       LABS AND IMAGING:     Labs:  Recent labs personally reviewed in the EMR (from date: 4/24/25)    Most recent CBC: WBC 8.7, Hgb 16.9, Plts 146.   Most recent chemistries: glucose 88, Na 140, K 3.2, BUN 16, sCr 0.83, alk phos 101, ALT 16, AST 18, bilirubin 1.2. Magnesium No results found for requested labs within last 365 days..    Imaging:  Imaging  No results found.    Cardiology, Vascular, and Other Imaging  FL less than 1 hour  Result Date: 5/5/2025  These images are not reportable by radiology and will not be interpreted by  Radiologists.              [1]   Family History  Problem Relation Name Age of Onset    Cancer Mother      COPD Father      [2] acetaminophen, 650 mg, oral, q6h CAIO  [START ON 5/6/2025] aspirin, 81 mg, oral, Daily  atorvastatin, 40 mg, oral, Nightly  ceFAZolin, 2 g, intravenous, q8h  [START ON 5/6/2025] cholecalciferol, 50 mcg, oral, Daily  [START ON 5/6/2025] clopidogrel, 75 mg, oral, Daily  heparin (porcine), 5,000 Units, subcutaneous, q8h  [START ON 5/6/2025] multivitamin with minerals, 1 tablet, oral, Daily  oxygen, , inhalation, Continuous - Inhalation  sennosides-docusate sodium, 1 tablet, oral, Nightly  [START ON 5/6/2025] thiamine, 100 mg, oral, Daily    [3] PRN medications: albuterol, melatonin, morphine, naloxone, ondansetron ODT **OR** ondansetron, oxyCODONE

## 2025-05-05 NOTE — ANESTHESIA PREPROCEDURE EVALUATION
Patient: Isaac Condon    Procedure Information       Date/Time: 05/05/25 1015    Procedure: TRANSCAROTID ARTERY REVASCULARIZATION (TCAR), WITH INTRAOPERATIVE FLOW REVERSAL *sds* C-ARM (Right)    Location: [facility] OR 08 / [facility]    Surgeons: Rohit Crooks, DO            Relevant Problems   Anesthesia (within normal limits)      Cardiac   (+) Pure hypercholesterolemia      Pulmonary   (+) Chronic bronchitis (Multi)   (+) Lung nodules      Neuro   (+) Bilateral carotid artery disease      /Renal   (+) Hyponatremia      Musculoskeletal   (+) Degeneration of lumbar intervertebral disc   (+) Spinal stenosis of lumbar region      HEENT   (+) Bilateral asymmetric sensorineural hearing loss       Clinical information reviewed:   Tobacco  Allergies  Meds  Problems  Med Hx  Surg Hx   Fam Hx  Soc   Hx        NPO Detail:  No data recorded     Physical Exam    Airway  Mallampati: II  TM distance: >3 FB  Neck ROM: full  Mouth opening: 3 or more finger widths     Cardiovascular - normal exam   Dental     (+) upper dentures, lower dentures     Pulmonary - normal exam   Abdominal - normal exam           Anesthesia Plan    History of general anesthesia?: yes  History of complications of general anesthesia?: no    ASA 3     general   (A-line)  The patient is not a current smoker.    intravenous induction   Postoperative pain plan includes opioids.  Anesthetic plan and risks discussed with patient.    Plan discussed with CRNA.

## 2025-05-06 ENCOUNTER — PHARMACY VISIT (OUTPATIENT)
Dept: PHARMACY | Facility: CLINIC | Age: 70
End: 2025-05-06
Payer: COMMERCIAL

## 2025-05-06 LAB
ACT BLD: 154 SEC (ref 89–169)
ACT BLD: 167 SEC (ref 89–169)
ACT BLD: 290 SEC (ref 89–169)
ANION GAP SERPL CALC-SCNC: 12 MMOL/L (ref 10–20)
BUN SERPL-MCNC: 8 MG/DL (ref 6–23)
CALCIUM SERPL-MCNC: 8.6 MG/DL (ref 8.6–10.3)
CHLORIDE SERPL-SCNC: 103 MMOL/L (ref 98–107)
CO2 SERPL-SCNC: 23 MMOL/L (ref 21–32)
CREAT SERPL-MCNC: 0.85 MG/DL (ref 0.5–1.3)
EGFRCR SERPLBLD CKD-EPI 2021: >90 ML/MIN/1.73M*2
ERYTHROCYTE [DISTWIDTH] IN BLOOD BY AUTOMATED COUNT: 11.7 % (ref 11.5–14.5)
GLUCOSE SERPL-MCNC: 101 MG/DL (ref 74–99)
HCT VFR BLD AUTO: 34.9 % (ref 41–52)
HGB BLD-MCNC: 12.2 G/DL (ref 13.5–17.5)
MAGNESIUM SERPL-MCNC: 1.71 MG/DL (ref 1.6–2.4)
MCH RBC QN AUTO: 32.4 PG (ref 26–34)
MCHC RBC AUTO-ENTMCNC: 35 G/DL (ref 32–36)
MCV RBC AUTO: 93 FL (ref 80–100)
NRBC BLD-RTO: 0 /100 WBCS (ref 0–0)
PHOSPHATE SERPL-MCNC: 3.3 MG/DL (ref 2.5–4.9)
PLATELET # BLD AUTO: 226 X10*3/UL (ref 150–450)
POTASSIUM SERPL-SCNC: 3.8 MMOL/L (ref 3.5–5.3)
RBC # BLD AUTO: 3.76 X10*6/UL (ref 4.5–5.9)
SODIUM SERPL-SCNC: 134 MMOL/L (ref 136–145)
WBC # BLD AUTO: 11.3 X10*3/UL (ref 4.4–11.3)

## 2025-05-06 PROCEDURE — 97161 PT EVAL LOW COMPLEX 20 MIN: CPT | Mod: GP | Performed by: PHYSICAL THERAPIST

## 2025-05-06 PROCEDURE — 2500000004 HC RX 250 GENERAL PHARMACY W/ HCPCS (ALT 636 FOR OP/ED): Mod: JZ | Performed by: INTERNAL MEDICINE

## 2025-05-06 PROCEDURE — 84100 ASSAY OF PHOSPHORUS: CPT | Performed by: PHYSICIAN ASSISTANT

## 2025-05-06 PROCEDURE — 83735 ASSAY OF MAGNESIUM: CPT | Performed by: PHYSICIAN ASSISTANT

## 2025-05-06 PROCEDURE — 80048 BASIC METABOLIC PNL TOTAL CA: CPT | Performed by: PHYSICIAN ASSISTANT

## 2025-05-06 PROCEDURE — 99024 POSTOP FOLLOW-UP VISIT: CPT | Performed by: PHYSICIAN ASSISTANT

## 2025-05-06 PROCEDURE — 97165 OT EVAL LOW COMPLEX 30 MIN: CPT | Mod: GO

## 2025-05-06 PROCEDURE — 2020000001 HC ICU ROOM DAILY

## 2025-05-06 PROCEDURE — 37799 UNLISTED PX VASCULAR SURGERY: CPT | Performed by: PHYSICIAN ASSISTANT

## 2025-05-06 PROCEDURE — 2500000005 HC RX 250 GENERAL PHARMACY W/O HCPCS: Performed by: PHYSICIAN ASSISTANT

## 2025-05-06 PROCEDURE — RXMED WILLOW AMBULATORY MEDICATION CHARGE

## 2025-05-06 PROCEDURE — 2500000001 HC RX 250 WO HCPCS SELF ADMINISTERED DRUGS (ALT 637 FOR MEDICARE OP): Performed by: INTERNAL MEDICINE

## 2025-05-06 PROCEDURE — 2500000004 HC RX 250 GENERAL PHARMACY W/ HCPCS (ALT 636 FOR OP/ED): Mod: JZ | Performed by: PHYSICIAN ASSISTANT

## 2025-05-06 PROCEDURE — 99233 SBSQ HOSP IP/OBS HIGH 50: CPT | Performed by: INTERNAL MEDICINE

## 2025-05-06 PROCEDURE — 99233 SBSQ HOSP IP/OBS HIGH 50: CPT | Performed by: PHYSICIAN ASSISTANT

## 2025-05-06 PROCEDURE — 85027 COMPLETE CBC AUTOMATED: CPT | Performed by: PHYSICIAN ASSISTANT

## 2025-05-06 PROCEDURE — 2500000001 HC RX 250 WO HCPCS SELF ADMINISTERED DRUGS (ALT 637 FOR MEDICARE OP): Performed by: PHYSICIAN ASSISTANT

## 2025-05-06 RX ORDER — ACETAMINOPHEN 325 MG/1
650 TABLET ORAL EVERY 6 HOURS PRN
Qty: 15 TABLET | Refills: 0 | Status: SHIPPED | OUTPATIENT
Start: 2025-05-06

## 2025-05-06 RX ORDER — PHENYLEPHRINE 10 MG/250 ML(40 MCG/ML)IN 0.9 % SOD.CHLORIDE INTRAVENOUS
0-2 CONTINUOUS
Status: DISCONTINUED | OUTPATIENT
Start: 2025-05-06 | End: 2025-05-07 | Stop reason: HOSPADM

## 2025-05-06 RX ORDER — PHENYLEPHRINE 10 MG/250 ML(40 MCG/ML)IN 0.9 % SOD.CHLORIDE INTRAVENOUS
0-2 CONTINUOUS
Status: DISCONTINUED | OUTPATIENT
Start: 2025-05-06 | End: 2025-05-06

## 2025-05-06 RX ORDER — MIDODRINE HYDROCHLORIDE 10 MG/1
10 TABLET ORAL
Status: DISCONTINUED | OUTPATIENT
Start: 2025-05-06 | End: 2025-05-07 | Stop reason: HOSPADM

## 2025-05-06 RX ORDER — MIDODRINE HYDROCHLORIDE 2.5 MG/1
5 TABLET ORAL ONCE
Status: COMPLETED | OUTPATIENT
Start: 2025-05-06 | End: 2025-05-06

## 2025-05-06 RX ADMIN — ACETAMINOPHEN 650 MG: 325 TABLET ORAL at 18:18

## 2025-05-06 RX ADMIN — SODIUM CHLORIDE, SODIUM LACTATE, POTASSIUM CHLORIDE, AND CALCIUM CHLORIDE 250 ML: .6; .31; .03; .02 INJECTION, SOLUTION INTRAVENOUS at 12:27

## 2025-05-06 RX ADMIN — HEPARIN SODIUM 5000 UNITS: 5000 INJECTION, SOLUTION INTRAVENOUS; SUBCUTANEOUS at 14:25

## 2025-05-06 RX ADMIN — CLOPIDOGREL 75 MG: 75 TABLET ORAL at 08:50

## 2025-05-06 RX ADMIN — Medication 50 MCG: at 08:50

## 2025-05-06 RX ADMIN — Medication 1 MCG/KG/MIN: at 08:44

## 2025-05-06 RX ADMIN — ATORVASTATIN CALCIUM 40 MG: 40 TABLET, FILM COATED ORAL at 20:33

## 2025-05-06 RX ADMIN — HEPARIN SODIUM 5000 UNITS: 5000 INJECTION, SOLUTION INTRAVENOUS; SUBCUTANEOUS at 21:48

## 2025-05-06 RX ADMIN — ASPIRIN 81 MG: 81 TABLET, COATED ORAL at 08:50

## 2025-05-06 RX ADMIN — HEPARIN SODIUM 5000 UNITS: 5000 INJECTION, SOLUTION INTRAVENOUS; SUBCUTANEOUS at 06:28

## 2025-05-06 RX ADMIN — Medication 21 PERCENT: at 20:35

## 2025-05-06 RX ADMIN — MIDODRINE HYDROCHLORIDE 10 MG: 10 TABLET ORAL at 17:25

## 2025-05-06 RX ADMIN — Medication 0.5 MCG/KG/MIN: at 03:27

## 2025-05-06 RX ADMIN — ACETAMINOPHEN 650 MG: 325 TABLET ORAL at 12:27

## 2025-05-06 RX ADMIN — Medication 1 TABLET: at 08:50

## 2025-05-06 RX ADMIN — THIAMINE HCL TAB 100 MG 100 MG: 100 TAB at 06:28

## 2025-05-06 RX ADMIN — Medication 21 PERCENT: at 21:48

## 2025-05-06 RX ADMIN — MIDODRINE HYDROCHLORIDE 5 MG: 2.5 TABLET ORAL at 12:27

## 2025-05-06 RX ADMIN — ACETAMINOPHEN 650 MG: 325 TABLET ORAL at 06:28

## 2025-05-06 RX ADMIN — CEFAZOLIN SODIUM 2 G: 2 SOLUTION INTRAVENOUS at 03:27

## 2025-05-06 ASSESSMENT — LIFESTYLE VARIABLES
TREMOR: NO TREMOR
TREMOR: NO TREMOR
AGITATION: NORMAL ACTIVITY
NAUSEA AND VOMITING: NO NAUSEA AND NO VOMITING
VISUAL DISTURBANCES: NOT PRESENT
ANXIETY: NO ANXIETY, AT EASE
AUDITORY DISTURBANCES: NOT PRESENT
NAUSEA AND VOMITING: NO NAUSEA AND NO VOMITING
ANXIETY: NO ANXIETY, AT EASE
VISUAL DISTURBANCES: NOT PRESENT
TOTAL SCORE: 0
ORIENTATION AND CLOUDING OF SENSORIUM: ORIENTED AND CAN DO SERIAL ADDITIONS
TREMOR: NO TREMOR
ORIENTATION AND CLOUDING OF SENSORIUM: ORIENTED AND CAN DO SERIAL ADDITIONS
AUDITORY DISTURBANCES: NOT PRESENT
ANXIETY: NO ANXIETY, AT EASE
TREMOR: NO TREMOR
TOTAL SCORE: 0
HEADACHE, FULLNESS IN HEAD: NOT PRESENT
AUDITORY DISTURBANCES: NOT PRESENT
AUDITORY DISTURBANCES: NOT PRESENT
VISUAL DISTURBANCES: NOT PRESENT
HEADACHE, FULLNESS IN HEAD: NOT PRESENT
TOTAL SCORE: 0
TOTAL SCORE: 0
AGITATION: NORMAL ACTIVITY
HEADACHE, FULLNESS IN HEAD: NOT PRESENT
AUDITORY DISTURBANCES: NOT PRESENT
TOTAL SCORE: 0
AUDITORY DISTURBANCES: NOT PRESENT
VISUAL DISTURBANCES: NOT PRESENT
TOTAL SCORE: 0
AGITATION: NORMAL ACTIVITY
HEADACHE, FULLNESS IN HEAD: NOT PRESENT
NAUSEA AND VOMITING: NO NAUSEA AND NO VOMITING
AUDITORY DISTURBANCES: NOT PRESENT
AUDITORY DISTURBANCES: NOT PRESENT
TOTAL SCORE: 0
ANXIETY: NO ANXIETY, AT EASE
PAROXYSMAL SWEATS: NO SWEAT VISIBLE
ANXIETY: NO ANXIETY, AT EASE
ANXIETY: NO ANXIETY, AT EASE
ORIENTATION AND CLOUDING OF SENSORIUM: ORIENTED AND CAN DO SERIAL ADDITIONS
AUDITORY DISTURBANCES: NOT PRESENT
HEADACHE, FULLNESS IN HEAD: NOT PRESENT
TREMOR: NO TREMOR
ORIENTATION AND CLOUDING OF SENSORIUM: ORIENTED AND CAN DO SERIAL ADDITIONS
NAUSEA AND VOMITING: NO NAUSEA AND NO VOMITING
ORIENTATION AND CLOUDING OF SENSORIUM: ORIENTED AND CAN DO SERIAL ADDITIONS
NAUSEA AND VOMITING: NO NAUSEA AND NO VOMITING
PAROXYSMAL SWEATS: NO SWEAT VISIBLE
HEADACHE, FULLNESS IN HEAD: NOT PRESENT
HEADACHE, FULLNESS IN HEAD: NOT PRESENT
NAUSEA AND VOMITING: NO NAUSEA AND NO VOMITING
NAUSEA AND VOMITING: NO NAUSEA AND NO VOMITING
VISUAL DISTURBANCES: NOT PRESENT
AUDITORY DISTURBANCES: NOT PRESENT
NAUSEA AND VOMITING: NO NAUSEA AND NO VOMITING
AGITATION: NORMAL ACTIVITY
TREMOR: NO TREMOR
ANXIETY: NO ANXIETY, AT EASE
AGITATION: NORMAL ACTIVITY
PAROXYSMAL SWEATS: NO SWEAT VISIBLE
NAUSEA AND VOMITING: NO NAUSEA AND NO VOMITING
AGITATION: NORMAL ACTIVITY
PAROXYSMAL SWEATS: NO SWEAT VISIBLE
ORIENTATION AND CLOUDING OF SENSORIUM: ORIENTED AND CAN DO SERIAL ADDITIONS
PAROXYSMAL SWEATS: NO SWEAT VISIBLE
VISUAL DISTURBANCES: NOT PRESENT
ORIENTATION AND CLOUDING OF SENSORIUM: ORIENTED AND CAN DO SERIAL ADDITIONS
ORIENTATION AND CLOUDING OF SENSORIUM: ORIENTED AND CAN DO SERIAL ADDITIONS
HEADACHE, FULLNESS IN HEAD: NOT PRESENT
TREMOR: NO TREMOR
PAROXYSMAL SWEATS: NO SWEAT VISIBLE
ANXIETY: NO ANXIETY, AT EASE
ORIENTATION AND CLOUDING OF SENSORIUM: ORIENTED AND CAN DO SERIAL ADDITIONS
VISUAL DISTURBANCES: NOT PRESENT
TOTAL SCORE: 0
VISUAL DISTURBANCES: NOT PRESENT
TREMOR: NO TREMOR
ORIENTATION AND CLOUDING OF SENSORIUM: ORIENTED AND CAN DO SERIAL ADDITIONS
TOTAL SCORE: 0
HEADACHE, FULLNESS IN HEAD: NOT PRESENT
AGITATION: NORMAL ACTIVITY
TOTAL SCORE: 0
AGITATION: NORMAL ACTIVITY
TREMOR: NO TREMOR
TREMOR: NO TREMOR
PAROXYSMAL SWEATS: NO SWEAT VISIBLE
HEADACHE, FULLNESS IN HEAD: NOT PRESENT
PAROXYSMAL SWEATS: NO SWEAT VISIBLE
NAUSEA AND VOMITING: NO NAUSEA AND NO VOMITING

## 2025-05-06 ASSESSMENT — PAIN SCALES - GENERAL
PAINLEVEL_OUTOF10: 0 - NO PAIN

## 2025-05-06 ASSESSMENT — ENCOUNTER SYMPTOMS
DIAPHORESIS: 0
WEAKNESS: 0
NAUSEA: 0
DIZZINESS: 0
COUGH: 0
CHILLS: 0
CONSTIPATION: 0
VOMITING: 0
HEMATURIA: 0
CHEST TIGHTNESS: 0
BLOOD IN STOOL: 0
LIGHT-HEADEDNESS: 0
WHEEZING: 0
APPETITE CHANGE: 0
BRUISES/BLEEDS EASILY: 0
EYE PAIN: 0
HEADACHES: 0
BACK PAIN: 0
SPEECH DIFFICULTY: 1
HALLUCINATIONS: 0
FLANK PAIN: 0
FREQUENCY: 0
DIARRHEA: 0
JOINT SWELLING: 0
DYSURIA: 0
FATIGUE: 0
SHORTNESS OF BREATH: 0
WOUND: 0
TROUBLE SWALLOWING: 0
FEVER: 0
NUMBNESS: 0
FACIAL SWELLING: 0
PALPITATIONS: 0
SORE THROAT: 0
ABDOMINAL PAIN: 0

## 2025-05-06 ASSESSMENT — PAIN - FUNCTIONAL ASSESSMENT
PAIN_FUNCTIONAL_ASSESSMENT: 0-10

## 2025-05-06 ASSESSMENT — COGNITIVE AND FUNCTIONAL STATUS - GENERAL
MOBILITY SCORE: 19
DRESSING REGULAR LOWER BODY CLOTHING: A LITTLE
CLIMB 3 TO 5 STEPS WITH RAILING: A LITTLE
DRESSING REGULAR UPPER BODY CLOTHING: A LITTLE
HELP NEEDED FOR BATHING: A LITTLE
DAILY ACTIVITIY SCORE: 20
STANDING UP FROM CHAIR USING ARMS: A LITTLE
TOILETING: A LITTLE
DAILY ACTIVITIY SCORE: 22
WALKING IN HOSPITAL ROOM: A LITTLE
DRESSING REGULAR LOWER BODY CLOTHING: A LITTLE
HELP NEEDED FOR BATHING: A LITTLE
WALKING IN HOSPITAL ROOM: A LITTLE
MOBILITY SCORE: 22
MOVING TO AND FROM BED TO CHAIR: A LITTLE
CLIMB 3 TO 5 STEPS WITH RAILING: A LOT

## 2025-05-06 ASSESSMENT — ACTIVITIES OF DAILY LIVING (ADL)
ADL_ASSISTANCE: INDEPENDENT
BATHING_ASSISTANCE: MINIMAL

## 2025-05-06 NOTE — CARE PLAN
The patient's goals for the shift include      The clinical goals for the shift include will remain hemo stable through shift      Problem: Pain - Adult  Goal: Verbalizes/displays adequate comfort level or baseline comfort level  Outcome: Progressing     Problem: Safety - Adult  Goal: Free from fall injury  Outcome: Progressing     Problem: Discharge Planning  Goal: Discharge to home or other facility with appropriate resources  Outcome: Progressing     Problem: Chronic Conditions and Co-morbidities  Goal: Patient's chronic conditions and co-morbidity symptoms are monitored and maintained or improved  Outcome: Progressing     Problem: Nutrition  Goal: Nutrient intake appropriate for maintaining nutritional needs  Outcome: Progressing     Problem: Pain  Goal: Takes deep breaths with improved pain control throughout the shift  Outcome: Progressing  Goal: Turns in bed with improved pain control throughout the shift  Outcome: Progressing  Goal: Walks with improved pain control throughout the shift  Outcome: Progressing  Goal: Performs ADL's with improved pain control throughout shift  Outcome: Progressing  Goal: Participates in PT with improved pain control throughout the shift  Outcome: Progressing  Goal: Free from opioid side effects throughout the shift  Outcome: Progressing  Goal: Free from acute confusion related to pain meds throughout the shift  Outcome: Progressing     Problem: Recent hospitalization or complication while living with CVA  Goal: Patient will effectively self-manage their CVA disease process  Outcome: Progressing     Problem: Fall/Injury  Goal: Not fall by end of shift  Outcome: Progressing  Goal: Be free from injury by end of the shift  Outcome: Progressing  Goal: Verbalize understanding of personal risk factors for fall in the hospital  Outcome: Progressing  Goal: Verbalize understanding of risk factor reduction measures to prevent injury from fall in the home  Outcome: Progressing  Goal: Use  assistive devices by end of the shift  Outcome: Progressing  Goal: Pace activities to prevent fatigue by end of the shift  Outcome: Progressing

## 2025-05-06 NOTE — PROGRESS NOTES
5/6/25 1514   05/06/25 1514   Discharge Planning   Living Arrangements Spouse/significant other   Support Systems Spouse/significant other   Assistance Needed None   Type of Residence Private residence   Number of Stairs to Enter Residence 1   Number of Stairs Within Residence 11   Home or Post Acute Services None   Expected Discharge Disposition Home   Does the patient need discharge transport arranged? No   Intensity of Service   Intensity of Service 0-30 min     Spoke with pt. Pt from home and lives in a house with spouse. Pt appeared alert and oriented but states is [place]. Pt states being independent and darek use of any assistive devices. Pt states wife drives and is able to help pt obtain meds and get to appointments. PCP is [facility] and has seen within the last month after discharge from [facility]. Pt states follows with outpt therapy in [place]. Pt preferred plan is home without any discharge needs. Pt does admit to 1-2 beers per week and feels this is well controlled. Pt denies need for abstinence resources. Pt darek any home going needs. Pt aware to reach out if needs arise.   Cortney Kennedy RN, BSN, [facility] [employer] Supervisor

## 2025-05-06 NOTE — CARE PLAN
The patient's goals for the shift include      The clinical goals for the shift include Pt will maintain -160 this shift      Problem: Pain - Adult  Goal: Verbalizes/displays adequate comfort level or baseline comfort level  Outcome: Progressing     Problem: Safety - Adult  Goal: Free from fall injury  Outcome: Progressing     Problem: Discharge Planning  Goal: Discharge to home or other facility with appropriate resources  Outcome: Progressing     Problem: Chronic Conditions and Co-morbidities  Goal: Patient's chronic conditions and co-morbidity symptoms are monitored and maintained or improved  Outcome: Progressing     Problem: Nutrition  Goal: Nutrient intake appropriate for maintaining nutritional needs  Outcome: Progressing     Problem: Pain  Goal: Takes deep breaths with improved pain control throughout the shift  Outcome: Progressing  Goal: Turns in bed with improved pain control throughout the shift  Outcome: Progressing  Goal: Walks with improved pain control throughout the shift  Outcome: Progressing  Goal: Performs ADL's with improved pain control throughout shift  Outcome: Progressing  Goal: Participates in PT with improved pain control throughout the shift  Outcome: Progressing  Goal: Free from opioid side effects throughout the shift  Outcome: Progressing  Goal: Free from acute confusion related to pain meds throughout the shift  Outcome: Progressing     Problem: Recent hospitalization or complication while living with CVA  Goal: Patient will effectively self-manage their CVA disease process  Outcome: Progressing     Problem: Fall/Injury  Goal: Not fall by end of shift  Outcome: Progressing  Goal: Be free from injury by end of the shift  Outcome: Progressing  Goal: Verbalize understanding of personal risk factors for fall in the hospital  Outcome: Progressing  Goal: Verbalize understanding of risk factor reduction measures to prevent injury from fall in the home  Outcome: Progressing  Goal: Use  assistive devices by end of the shift  Outcome: Progressing  Goal: Pace activities to prevent fatigue by end of the shift  Outcome: Progressing

## 2025-05-06 NOTE — PROGRESS NOTES
Isaac Condon is a 70 y.o. male on day 1 of admission presenting with Symptomatic stenosis of right carotid artery.      Subjective   Isaac Condon is a 70 y.o. male with past medical history s/f PFO, hyperlipidemia, COPD, carotid stenosis with suspected acute ischemic stroke on 3/14/2025 with left facial droop, alcohol abuse (in remission), and lung nodules presented 5/5/25 after right TCAR. Medicine was consulted for medical management.    5/6/2025: No acute events overnight. Vitals stable, /79. CBC/BMP reviewed, Hgb is slightly lower at 12.2  He is hopeful to go home today  Multiple family members at bedside         Review of Systems   Constitutional:  Negative for appetite change, chills, diaphoresis, fatigue and fever.   HENT:  Negative for congestion, ear pain, facial swelling, hearing loss, nosebleeds, sore throat, tinnitus and trouble swallowing.    Eyes:  Negative for pain.   Respiratory:  Negative for cough, chest tightness, shortness of breath and wheezing.    Cardiovascular:  Negative for chest pain, palpitations and leg swelling.   Gastrointestinal:  Negative for abdominal pain, blood in stool, constipation, diarrhea, nausea and vomiting.   Genitourinary:  Negative for dysuria, flank pain, frequency, hematuria and urgency.   Musculoskeletal:  Negative for back pain and joint swelling.   Skin:  Negative for rash and wound.   Neurological:  Positive for speech difficulty (Chronic). Negative for dizziness, syncope, weakness, light-headedness, numbness and headaches.   Hematological:  Does not bruise/bleed easily.   Psychiatric/Behavioral:  Negative for behavioral problems, hallucinations and suicidal ideas.           Objective     Last Recorded Vitals  /79   Pulse 54   Temp 36.4 °C (97.6 °F)   Resp 13   Wt 68.9 kg (151 lb 14.4 oz)   SpO2 98%     Image Results  Imaging  No results found.    Cardiology, Vascular, and Other Imaging  FL less than 1 hour  Result Date: 5/5/2025  These images  are not reportable by radiology and will not be interpreted by  Radiologists.         Lab Results  Results for orders placed or performed during the hospital encounter of 05/05/25 (from the past 24 hours)   Type and screen   Result Value Ref Range    ABO TYPE O     Rh TYPE POS     ANTIBODY SCREEN NEG    Basic Metabolic Panel   Result Value Ref Range    Glucose 101 (H) 74 - 99 mg/dL    Sodium 134 (L) 136 - 145 mmol/L    Potassium 3.8 3.5 - 5.3 mmol/L    Chloride 103 98 - 107 mmol/L    Bicarbonate 23 21 - 32 mmol/L    Anion Gap 12 10 - 20 mmol/L    Urea Nitrogen 8 6 - 23 mg/dL    Creatinine 0.85 0.50 - 1.30 mg/dL    eGFR >90 >60 mL/min/1.73m*2    Calcium 8.6 8.6 - 10.3 mg/dL   CBC   Result Value Ref Range    WBC 11.3 4.4 - 11.3 x10*3/uL    nRBC 0.0 0.0 - 0.0 /100 WBCs    RBC 3.76 (L) 4.50 - 5.90 x10*6/uL    Hemoglobin 12.2 (L) 13.5 - 17.5 g/dL    Hematocrit 34.9 (L) 41.0 - 52.0 %    MCV 93 80 - 100 fL    MCH 32.4 26.0 - 34.0 pg    MCHC 35.0 32.0 - 36.0 g/dL    RDW 11.7 11.5 - 14.5 %    Platelets 226 150 - 450 x10*3/uL   Magnesium   Result Value Ref Range    Magnesium 1.71 1.60 - 2.40 mg/dL   Phosphorus   Result Value Ref Range    Phosphorus 3.3 2.5 - 4.9 mg/dL        Medications  Scheduled medications:  Scheduled Medications[1]  Continuous medications:  Continuous Medications[2]  PRN medications:  PRN Medications[3]     Physical Exam  Constitutional:       General: He is not in acute distress.     Appearance: Normal appearance.   HENT:      Head: Normocephalic and atraumatic.      Right Ear: External ear normal.      Left Ear: External ear normal.      Nose: Nose normal.      Mouth/Throat:      Mouth: Mucous membranes are moist.      Pharynx: Oropharynx is clear.   Eyes:      Extraocular Movements: Extraocular movements intact.      Conjunctiva/sclera: Conjunctivae normal.      Pupils: Pupils are equal, round, and reactive to light.   Cardiovascular:      Rate and Rhythm: Normal rate and regular rhythm.      Pulses:  Normal pulses.      Heart sounds: Normal heart sounds.      Comments: Post-operative dressings C/D/I, did not remove dressing  NVS intact distal to operative site   Pulmonary:      Effort: Pulmonary effort is normal. No respiratory distress.      Breath sounds: Normal breath sounds. No wheezing, rhonchi or rales.   Abdominal:      General: Bowel sounds are normal.      Palpations: Abdomen is soft.      Tenderness: There is no abdominal tenderness. There is no right CVA tenderness, left CVA tenderness, guarding or rebound.   Musculoskeletal:         General: No swelling. Normal range of motion.      Cervical back: Normal range of motion and neck supple.   Skin:     General: Skin is warm and dry.      Capillary Refill: Capillary refill takes less than 2 seconds.      Findings: No lesion or rash.   Neurological:      Mental Status: He is alert and oriented to person, place, and time. Mental status is at baseline.      Comments: Chronic L facial droop  Chronic dysarthria   Psychiatric:         Mood and Affect: Mood normal.         Behavior: Behavior normal.                  Assessment/Plan      Right carotid artery stenosis s/p TCAR by Dr. Crooks on 5/5/25  Recent ischemic stroke 3/14/25, PFO by echo  HLD  DAPT  Statin  BP control  Pain medications  Rest of care per primary team and ICU consultation     COPD  Not in acute exacerbation  PRN albuterol     History of alcohol use  In remission    Lung nodules  Continue outpatient follow up    Code Status: Full Code                 DVT ppx: Per primary    Thank you for involving us in the care of this very pleasant patient. We will follow along with you while they remain admitted. Please contact Hospitalist service if any clarification needed.      Please see orders for more complete plan    Shantelle Cobian PA-C         [1] acetaminophen, 650 mg, oral, q6h CAIO  aspirin, 81 mg, oral, Daily  atorvastatin, 40 mg, oral, Nightly  cholecalciferol, 50 mcg, oral,  Daily  clopidogrel, 75 mg, oral, Daily  heparin (porcine), 5,000 Units, subcutaneous, q8h  multivitamin with minerals, 1 tablet, oral, Daily  oxygen, , inhalation, Continuous - Inhalation  sennosides-docusate sodium, 1 tablet, oral, Nightly  thiamine, 100 mg, oral, Daily    [2] niCARdipine, 2.5-15 mg/hr  phenylephrine, 0-2 mcg/kg/min, Last Rate: 0.5 mcg/kg/min (05/06/25 0327)    [3] PRN medications: albuterol, melatonin, morphine, naloxone, ondansetron ODT **OR** ondansetron, oxyCODONE

## 2025-05-06 NOTE — PROGRESS NOTES
Physical Therapy    Physical Therapy Evaluation    Patient Name: Isaac Condon  MRN: [id number]  Today's Date: 5/6/2025   Time Calculation  Start Time: 1430  Stop Time: 1440  Time Calculation (min): 10 min  04/04-A    Assessment/Plan   PT Assessment  PT Assessment Results: Decreased mobility, Impaired balance  Rehab Prognosis: Good  Medical Staff Made Aware: Yes  End of Session Communication: Bedside nurse  Assessment Comment: Patient requires only SBA/CGA x1 for mobility. Anticipate good improvement over course of admit.  End of Session Patient Position: Alarm on, Up in chair  IP OR SWING BED PT PLAN  Inpatient or Swing Bed: Inpatient  PT Plan  Treatment/Interventions: Bed mobility, Transfer training, Gait training, Stair training, Balance training, Endurance training, Therapeutic exercise, Therapeutic activity, Home exercise program  PT Plan: Ongoing PT  PT Frequency: 3 times per week  PT Discharge Recommendations: Low intensity level of continued care  PT - OK to Discharge: Yes (when medically cleared)    General Visit Information:  General  Reason for Referral: Dx: TCAR  Referred By: Jesse  Past Medical History Relevant to Rehab: COPD, ETOH in past, CVA with L facial droop and garbled speech, decreased hearing  Prior to Session Communication: Bedside nurse  Patient Position Received: Bed, 3 rail up, Alarm off, not on at start of session  General Comment: Seen in room 1004, tele    Home Living:  Home Living  Type of Home:  (Lives with wife in 2 level home, bed upstairs, full bath down. Patient normally fairly independent, amb without AD. Was going to Out Patient therapy.)    Prior Level of Function:       Precautions:  Precautions  Hearing/Visual Limitations: [place]  Precautions Comment: falls    Vital Signs:  Vital Signs  SpO2:  (89-92% on room air)  Objective     Pain:  Pain Assessment  0-10 (Numeric) Pain Score: 0 - No pain    Cognition:  Cognition  Overall Cognitive Status:  (Mild garbled speech)  Orientation  Level: Oriented X4    General Assessments:      Activity Tolerance  Endurance: Endurance does not limit participation in activity     Strength  Strength Comments: KAILEE LE quads grossly 4+/5        Postural Control  Postural Control:  (Sitting balance fair+; standing balance fair/fair+ without AD)          Functional Assessments:     Bed Mobility  Bed Mobility:  (Supine to sit with SBA x1)  Transfers  Transfer:  (Sit to stand with SBA x1; cues for safety)  Ambulation/Gait Training  Ambulation/Gait Training Performed:  (Amb 25 feet in room with SBA/CGA x1; cues for safety, balance, posture)          Extremity/Trunk Assessments:                Outcome Measures:     [facility] Basic Mobility  Turning from your back to your side while in a flat bed without using bedrails: None  Moving from lying on your back to sitting on the side of a flat bed without using bedrails: None  Moving to and from bed to chair (including a wheelchair): A little  Standing up from a chair using your arms (e.g. wheelchair or bedside chair): A little  To walk in hospital room: A little  Climbing 3-5 steps with railing: A lot  Basic Mobility - Total Score: 19                                                             Goals:  Encounter Problems       Encounter Problems (Active)       PT Problem       mobility        Start:  05/06/25    Expected End:  05/20/25       Patient will perform all mobility (transfers/amb/stairs) with SBA x1             Pain - Adult            Education Documentation  Precautions, taught by Esther Beavers, PT at 5/6/2025  3:25 PM.  Learner: Patient  Readiness: Acceptance  Method: Explanation  Response: Needs Reinforcement    Mobility Training, taught by Esther Beavers, PT at 5/6/2025  3:25 PM.  Learner: Patient  Readiness: Acceptance  Method: Explanation  Response: Needs Reinforcement    Education Comments  No comments found.

## 2025-05-06 NOTE — PROGRESS NOTES
Isaac Condon is a 70 y.o. male on day 1 of admission presenting with Symptomatic stenosis of right carotid artery.    Subjective   POD#1 s/p Right TCAR    Requiring phenylephrine for postoperative hypotension   No new neurologic deficits  No postoperative pain       Objective     Physical Exam  Physical Exam  Constitutional:       Appearance: Normal appearance.   HENT:      Head: Normocephalic.      Right Ear: External ear normal.      Left Ear: External ear normal.      Nose: Nose normal.      Mouth/Throat:      Mouth: Mucous membranes are moist.   Eyes:      Extraocular Movements: Extraocular movements intact.   Neck:      Vascular: No carotid bruit.   Cardiovascular:      Rate and Rhythm: Normal rate and regular rhythm.      Pulses: Normal pulses.   Pulmonary:      Effort: Pulmonary effort is normal. No respiratory distress.      Breath sounds: Normal breath sounds.   Abdominal:      Palpations: Abdomen is soft.      Tenderness: There is no abdominal tenderness.   Musculoskeletal:         General: No swelling or tenderness.      Cervical back: Normal range of motion and neck supple.      Right lower leg: No edema.      Left lower leg: No edema.   Skin:     General: Skin is warm and dry.      Capillary Refill: Capillary refill takes less than 2 seconds.      Findings: No lesion.   Neurological:      General: No focal deficit present.      Mental Status: He is alert and oriented to person, place, and time. Mental status is at baseline.      Cranial Nerves: Facial asymmetry present.      Motor: Weakness present.      Comments: Slight left facial droop and lUE weakness, stable from preoperative examination   Psychiatric:         Mood and Affect: Mood normal.         Behavior: Behavior normal.         Thought Content: Thought content normal.         Judgment: Judgment normal.   Last Recorded Vitals  Blood pressure 102/75, pulse 58, temperature 36.1 °C (97 °F), resp. rate 12, height 1.829 m (6'), weight 68.9 kg (151  lb 14.4 oz), SpO2 91%.  Vitals:    05/06/25 1300 05/06/25 1400 05/06/25 1429 05/06/25 1500   BP: 91/64 (!) 121/93  102/75   BP Location:       Patient Position:       Pulse: 62 68  58   Resp: 10 14  12   Temp:       TempSrc:       SpO2: 96% (!) 89% (!) 89% 91%   Weight:       Height:          Intake/Output last 3 Shifts:  I/O last 3 completed shifts:  In: 1550 (22.5 mL/kg) [I.V.:1500 (21.8 mL/kg); IV Piggyback:50]  Out: 750 (10.9 mL/kg) [Urine:725 (0.3 mL/kg/hr); Blood:25]  Weight: 68.9 kg     Relevant Results         Results for orders placed or performed during the hospital encounter of 05/05/25 (from the past 24 hours)   Basic Metabolic Panel   Result Value Ref Range    Glucose 101 (H) 74 - 99 mg/dL    Sodium 134 (L) 136 - 145 mmol/L    Potassium 3.8 3.5 - 5.3 mmol/L    Chloride 103 98 - 107 mmol/L    Bicarbonate 23 21 - 32 mmol/L    Anion Gap 12 10 - 20 mmol/L    Urea Nitrogen 8 6 - 23 mg/dL    Creatinine 0.85 0.50 - 1.30 mg/dL    eGFR >90 >60 mL/min/1.73m*2    Calcium 8.6 8.6 - 10.3 mg/dL   CBC   Result Value Ref Range    WBC 11.3 4.4 - 11.3 x10*3/uL    nRBC 0.0 0.0 - 0.0 /100 WBCs    RBC 3.76 (L) 4.50 - 5.90 x10*6/uL    Hemoglobin 12.2 (L) 13.5 - 17.5 g/dL    Hematocrit 34.9 (L) 41.0 - 52.0 %    MCV 93 80 - 100 fL    MCH 32.4 26.0 - 34.0 pg    MCHC 35.0 32.0 - 36.0 g/dL    RDW 11.7 11.5 - 14.5 %    Platelets 226 150 - 450 x10*3/uL   Magnesium   Result Value Ref Range    Magnesium 1.71 1.60 - 2.40 mg/dL   Phosphorus   Result Value Ref Range    Phosphorus 3.3 2.5 - 4.9 mg/dL                           Assessment & Plan  Symptomatic stenosis of right carotid artery     70 year old male with past medical history of HLD, HTN, COPD, former smoker, recent CVA 3/2025 with residual left sided weakness and left facial droop, symptomatic right carotid stenosis now POD#1 s/p Right TCAR with Dr Crooks 5/5/25. Neurologically stable, no new neuro deficits  -requiring phenylephrine for postoperative hypotension, able to wean  from the phenylephrine, still slightly hypotensive with systolics in upper 90s low 100s, will add midodrine  -continue to monitor for hypotension  -continue asa and plavix   -OOB and ambulation  -PT/OT  -plan for discharge home tomorrow  Discussed above course of care and treatment plan with Dr Crooks who was in agreement. Discussed with ICU and internal medicine teams.       Jessica Molina PA-C

## 2025-05-06 NOTE — CARE PLAN
Problem: Pain - Adult  Goal: Verbalizes/displays adequate comfort level or baseline comfort level  Outcome: Progressing     Problem: Safety - Adult  Goal: Free from fall injury  Outcome: Progressing     Problem: Discharge Planning  Goal: Discharge to home or other facility with appropriate resources  Outcome: Progressing     Problem: Chronic Conditions and Co-morbidities  Goal: Patient's chronic conditions and co-morbidity symptoms are monitored and maintained or improved  Outcome: Progressing     Problem: Nutrition  Goal: Nutrient intake appropriate for maintaining nutritional needs  Outcome: Progressing     Problem: Pain  Goal: Takes deep breaths with improved pain control throughout the shift  Outcome: Progressing  Goal: Turns in bed with improved pain control throughout the shift  Outcome: Progressing  Goal: Walks with improved pain control throughout the shift  Outcome: Progressing  Goal: Performs ADL's with improved pain control throughout shift  Outcome: Progressing  Goal: Participates in PT with improved pain control throughout the shift  Outcome: Progressing  Goal: Free from opioid side effects throughout the shift  Outcome: Progressing  Goal: Free from acute confusion related to pain meds throughout the shift  Outcome: Progressing     Problem: Recent hospitalization or complication while living with CVA  Goal: Patient will effectively self-manage their CVA disease process  Outcome: Progressing        PERRL/EOMI/conjunctiva clear/normal

## 2025-05-06 NOTE — PROGRESS NOTES
Occupational Therapy  Evaluation    Patient Name: Isaac Condon  MRN: [id number]  Today's Date: 5/6/2025  Time Calculation  Start Time: 1429  Stop Time: 1440  Time Calculation (min): 11 min    Current Problem:   1. Symptomatic stenosis of right carotid artery    2. Bilateral carotid artery stenosis        OT order: OT eval and treat   Referred by: Jesse  Reason for referral: recent surgery. R TCAR  Past medical history related to rehab:  has a past medical history of Bronchitis, COPD (chronic obstructive pulmonary disease) (Multi), HTN (hypertension), Hyperlipidemia, and Vasovagal syncope (01/28/2025).  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 The patient is functioning below baseline for ADLs and mobility. can benefit from continued OT. Pt with Decreased ADL status, Decreased IADLs, Decreased gross motor control, Decreased functional mobility  Prognosis:    Barriers to discharge home: Physical needs     Intermittent mobility assistance needed, Intermittent ADL assistance needed     Tolerance:      PLAN  Frequency: 2 times per week  Treatment Interventions: ADL retraining, Functional transfer training, UE strengthening/ROM, Endurance training, Cognitive reorientation, Patient/family training, Equipment evaluation/education  Discharge Recommendations: Low intensity level of continued care  OT OK to discharge: Yes    GENERAL VISIT INFORMATION   Start of session communication: Bedside nurse  End of session communication: Bedside nurse  Family/caregiver present: No  Caregiver feedback:    Co-Treatment: PT  Reason for co-treatment: to optimize safety and mobility, while focusing on discipline specific goals   Position Pt Received:  Bed, 3 rail up, Alarm off, not on at start of session  End of session position: Alarm on, Up in  chair    SUBJECTIVE  Home Living:  Type of Home: House  Lives With: Spouse  Home Adaptive Equipment: None  Home Layout: Multi-level (bath main floor. bedroom upstairs)  Home Access: Stairs to enter with rails     Prior Level of Function:  Receives Help From: Family  ADL Assistance: Independent  Homemaking Assistance: Needs assistance  Ambulatory Assistance: Independent (no AD)  Prior Function Comments: DC to IP rehab post stroke then transition to OP therapy recently    IADL History:       Pain:  Assessment: 0-10  Score: 0 - No pain  Type:    Location:    Interventions:    Response to pain interventions:      OBJECTIVE  Vital Signs:  SpO2: (!) 89 % (flucuated into mid 80s briefly but unsure strength of signal)    Cognition:  Overall Cognitive Status: Within Functional Limits  Orientation Level: Oriented X4  Processing Speed: Delayed             Current ADL function:   EATING:  Stand by     GROOMING: Stand by     BATHING: Minimal     UB DRESSING: Minimal     LB DRESSING: Stand by Thread RLE into pants, Thread LLE into pants, Pull up over hips   TOILETING: Minimal    ADL comments:       Activity Tolerance:  Endurance: Endurance does not limit participation in activity    Bed Mobility/Transfers:   Bed Mobility  Bed Mobility: Yes  Bed Mobility 1  Bed Mobility 1: Supine to sitting, Sitting to supine  Level of Assistance 1: Close supervision  Transfers  Transfer:  (sit<>stand SBAX1)    Ambulation/Gait Training:  Functional Mobility  Functional Mobility Performed:  (pt performed functional mobility with CGAX1 no AD)    Sitting Balance:  Static Sitting Balance  Static Sitting-Level of Assistance: Close supervision  Dynamic Sitting Balance  Dynamic Sitting-Level of Assistance: Close supervision    Standing Balance:  Static Standing Balance  Static Standing-Level of Assistance: Contact guard  Dynamic Standing Balance  Dynamic Standing-Level of Assistance: Contact guard    Vision: Vision - Basic Assessment  Current Vision: No  visual deficits   and      Sensation:  Light Touch: No apparent deficits    Strength:  Strength Comments: RUE 4+/5, LUE 4-/5 to 4+/5    Perception:  Inattention/Neglect: Appears intact    Coordination:  Movements are Fluid and Coordinated: No (mild impairment LEFT side)     Hand Function:  Hand Function  Gross Grasp: Functional    Extremities: RUE   RUE : Within Functional Limits and LUE   LUE: Within Functional Limits    Outcome Measures: [facility] Daily Activity   Putting on and taking off regular lower body clothing: A little  Bathing (including washing, rinsing, drying): A little  Putting on and taking off regular upper body clothing: A little  Toileting, which includes using toilet, bedpan or urinal: A little  Taking care of personal grooming such as brushing teeth: None   Eating Meals: None   Daily Activity - Total Score: 20    EDUCATION:     Education Documentation  Home Exercise Program, taught by Pily Rodriguez OT at 5/6/2025  3:05 PM.  Learner: Patient  Readiness: Acceptance  Method: Explanation  Response: Needs Reinforcement    ADL Training, taught by Pily Rodriguez OT at 5/6/2025  3:05 PM.  Learner: Patient  Readiness: Acceptance  Method: Explanation  Response: Needs Reinforcement    Education Comments  No comments found.        Goals:   Encounter Problems       Encounter Problems (Active)       BALANCE       Patient will tolerate standing for 10 minutes to modified independent level of assistance with least restrictive device in order to improve functional activity tolerance for ADL tasks. (Progressing)       Start:  05/06/25    Expected End:  05/20/25               MOBILITY       Patient will perform Functional mobility max Household distances/Community Distances with set-up level of assistance and least restrictive device in order to improve safety and functional mobility. (Progressing)       Start:  05/06/25    Expected End:  05/20/25

## 2025-05-06 NOTE — PROGRESS NOTES
Medication Education     Medication education for Isaac Condon was provided to the patient and family for the following medication(s):  Acetaminophen       Medication education provided by a Pharmacist:  Proper dose, indication, possible ADRs    Identified potential barriers to education:  None    Method(s) of Education:  Verbal    An opportunity to ask questions and receive answers was provided.     Assessment of understanding the patient and family:  2= meets goals/outcomes    Additional Notes (if applicable):     Shelby White, PharmD

## 2025-05-06 NOTE — PROGRESS NOTES
Critical Care Daily Progress Notes        Subjective   Patient is a 70 y.o. male admitted on 5/5/2025  8:54 AM with the following indication(s) for ICU care: post operative care.     Interval History:     No acute events overnight  Feels well this morning  Denies any pain  Did require some phenylephrine overnight however blood pressure in the 140s systolic this morning    Scheduled Medications:   Scheduled Medications[1]     Continuous Medications:   Continuous Medications[2]     PRN Medications:   PRN Medications[3]    Objective   Vitals:  Most Recent:  Vitals:    05/06/25 0900   BP: (!) 139/92   Pulse: 58   Resp: 14   Temp:    SpO2: 98%       24hr Min/Max:  Temp  Min: 35.9 °C (96.6 °F)  Max: 36.4 °C (97.6 °F)  Pulse  Min: 51  Max: 84  BP  Min: 107/71  Max: 149/84  Resp  Min: 7  Max: 20  SpO2  Min: 92 %  Max: 100 %    LDA:  Arterial Line 05/05/25 Right Radial (Active)   Placement Date/Time: 05/05/25 (c) 1150   Size: 20 G  Orientation: Right  Location: Radial  Securement Method: Taped   Number of days: 0         Vent settings:       Hemodynamic parameters for last 24 hours:       I/O:    Intake/Output Summary (Last 24 hours) at 5/6/2025 0909  Last data filed at 5/6/2025 0600  Gross per 24 hour   Intake 1550 ml   Output 750 ml   Net 800 ml       Physical Exam:   Physical Exam  Vitals and nursing note reviewed.   Constitutional:       Appearance: Normal appearance.   HENT:      Head: Normocephalic and atraumatic.      Mouth/Throat:      Mouth: Mucous membranes are dry.   Eyes:      Extraocular Movements: Extraocular movements intact.      Pupils: Pupils are equal, round, and reactive to light.   Cardiovascular:      Rate and Rhythm: Normal rate and regular rhythm.      Pulses: Normal pulses.      Heart sounds: Normal heart sounds.   Abdominal:      General: There is no distension.      Palpations: Abdomen is soft.   Musculoskeletal:         General: No swelling.   Skin:     General: Skin is dry.      Capillary Refill:  Capillary refill takes 2 to 3 seconds.   Neurological:      Mental Status: He is alert and oriented to person, place, and time.   Psychiatric:         Mood and Affect: Mood normal.         Behavior: Behavior normal.          Lab/Radiology/Diagnostic Review:  Results for orders placed or performed during the hospital encounter of 05/05/25 (from the past 24 hours)   Type and screen   Result Value Ref Range    ABO TYPE O     Rh TYPE POS     ANTIBODY SCREEN NEG    Basic Metabolic Panel   Result Value Ref Range    Glucose 101 (H) 74 - 99 mg/dL    Sodium 134 (L) 136 - 145 mmol/L    Potassium 3.8 3.5 - 5.3 mmol/L    Chloride 103 98 - 107 mmol/L    Bicarbonate 23 21 - 32 mmol/L    Anion Gap 12 10 - 20 mmol/L    Urea Nitrogen 8 6 - 23 mg/dL    Creatinine 0.85 0.50 - 1.30 mg/dL    eGFR >90 >60 mL/min/1.73m*2    Calcium 8.6 8.6 - 10.3 mg/dL   CBC   Result Value Ref Range    WBC 11.3 4.4 - 11.3 x10*3/uL    nRBC 0.0 0.0 - 0.0 /100 WBCs    RBC 3.76 (L) 4.50 - 5.90 x10*6/uL    Hemoglobin 12.2 (L) 13.5 - 17.5 g/dL    Hematocrit 34.9 (L) 41.0 - 52.0 %    MCV 93 80 - 100 fL    MCH 32.4 26.0 - 34.0 pg    MCHC 35.0 32.0 - 36.0 g/dL    RDW 11.7 11.5 - 14.5 %    Platelets 226 150 - 450 x10*3/uL   Magnesium   Result Value Ref Range    Magnesium 1.71 1.60 - 2.40 mg/dL   Phosphorus   Result Value Ref Range    Phosphorus 3.3 2.5 - 4.9 mg/dL     Imaging  No results found.    Cardiology, Vascular, and Other Imaging  FL less than 1 hour  Result Date: 5/5/2025  These images are not reportable by radiology and will not be interpreted by  Radiologists.                       Assessment/Plan   Assessment & Plan  Symptomatic stenosis of right carotid artery    Pleasant 70-year-old male with underlying history of hypertension, dyslipidemia, COPD, cochlear implant, peripheral vascular disease with carotid stenosis with suspected acute ischemic stroke on 3/14/2025 with left facial droop.  He is currently in ICU after a schedule right TCAR.     Neuro               - Alert responsive answering question appropriately     Respiratory              - Saturation well on room air     Cardiac/hemodynamics              - BP goal post TCAR 120-160 mmHg systolic              - Weaning down phenylephrine     Renal              - Renal function stable serum creatinine 0.85 mg/dL     GI              - Diet is being advanced     MSK              - No issue     Code Status: Full    Discussed with vascular surgery  Luis Manuel Grover MD         [1] acetaminophen, 650 mg, oral, q6h CAIO  aspirin, 81 mg, oral, Daily  atorvastatin, 40 mg, oral, Nightly  cholecalciferol, 50 mcg, oral, Daily  clopidogrel, 75 mg, oral, Daily  heparin (porcine), 5,000 Units, subcutaneous, q8h  multivitamin with minerals, 1 tablet, oral, Daily  oxygen, , inhalation, Continuous - Inhalation  sennosides-docusate sodium, 1 tablet, oral, Nightly  thiamine, 100 mg, oral, Daily  [2] niCARdipine, 2.5-15 mg/hr  phenylephrine, 0-2 mcg/kg/min  [3] PRN medications: albuterol, melatonin, morphine, naloxone, ondansetron ODT **OR** ondansetron, oxyCODONE

## 2025-05-07 ENCOUNTER — PHARMACY VISIT (OUTPATIENT)
Dept: PHARMACY | Facility: CLINIC | Age: 70
End: 2025-05-07
Payer: COMMERCIAL

## 2025-05-07 ENCOUNTER — DOCUMENTATION (OUTPATIENT)
Dept: OCCUPATIONAL THERAPY | Facility: CLINIC | Age: 70
End: 2025-05-07
Payer: MEDICARE

## 2025-05-07 VITALS
SYSTOLIC BLOOD PRESSURE: 126 MMHG | WEIGHT: 149.69 LBS | TEMPERATURE: 96.4 F | BODY MASS INDEX: 20.28 KG/M2 | DIASTOLIC BLOOD PRESSURE: 87 MMHG | RESPIRATION RATE: 18 BRPM | HEIGHT: 72 IN | OXYGEN SATURATION: 100 % | HEART RATE: 68 BPM

## 2025-05-07 LAB
ANION GAP SERPL CALC-SCNC: 9 MMOL/L (ref 10–20)
BUN SERPL-MCNC: 10 MG/DL (ref 6–23)
CALCIUM SERPL-MCNC: 8.4 MG/DL (ref 8.6–10.3)
CHLORIDE SERPL-SCNC: 105 MMOL/L (ref 98–107)
CO2 SERPL-SCNC: 26 MMOL/L (ref 21–32)
CREAT SERPL-MCNC: 0.93 MG/DL (ref 0.5–1.3)
EGFRCR SERPLBLD CKD-EPI 2021: 89 ML/MIN/1.73M*2
ERYTHROCYTE [DISTWIDTH] IN BLOOD BY AUTOMATED COUNT: 11.9 % (ref 11.5–14.5)
GLUCOSE SERPL-MCNC: 84 MG/DL (ref 74–99)
HCT VFR BLD AUTO: 35.9 % (ref 41–52)
HGB BLD-MCNC: 12 G/DL (ref 13.5–17.5)
MAGNESIUM SERPL-MCNC: 1.81 MG/DL (ref 1.6–2.4)
MCH RBC QN AUTO: 32.1 PG (ref 26–34)
MCHC RBC AUTO-ENTMCNC: 33.4 G/DL (ref 32–36)
MCV RBC AUTO: 96 FL (ref 80–100)
NRBC BLD-RTO: 0 /100 WBCS (ref 0–0)
PHOSPHATE SERPL-MCNC: 3.5 MG/DL (ref 2.5–4.9)
PLATELET # BLD AUTO: 182 X10*3/UL (ref 150–450)
POTASSIUM SERPL-SCNC: 3.7 MMOL/L (ref 3.5–5.3)
RBC # BLD AUTO: 3.74 X10*6/UL (ref 4.5–5.9)
SODIUM SERPL-SCNC: 136 MMOL/L (ref 136–145)
WBC # BLD AUTO: 6.5 X10*3/UL (ref 4.4–11.3)

## 2025-05-07 PROCEDURE — 80048 BASIC METABOLIC PNL TOTAL CA: CPT

## 2025-05-07 PROCEDURE — RXMED WILLOW AMBULATORY MEDICATION CHARGE

## 2025-05-07 PROCEDURE — 99233 SBSQ HOSP IP/OBS HIGH 50: CPT | Performed by: INTERNAL MEDICINE

## 2025-05-07 PROCEDURE — 83735 ASSAY OF MAGNESIUM: CPT

## 2025-05-07 PROCEDURE — 84100 ASSAY OF PHOSPHORUS: CPT

## 2025-05-07 PROCEDURE — 85027 COMPLETE CBC AUTOMATED: CPT

## 2025-05-07 PROCEDURE — 99233 SBSQ HOSP IP/OBS HIGH 50: CPT | Performed by: PHYSICIAN ASSISTANT

## 2025-05-07 PROCEDURE — 2500000005 HC RX 250 GENERAL PHARMACY W/O HCPCS: Performed by: PHYSICIAN ASSISTANT

## 2025-05-07 PROCEDURE — 36415 COLL VENOUS BLD VENIPUNCTURE: CPT

## 2025-05-07 PROCEDURE — 2500000004 HC RX 250 GENERAL PHARMACY W/ HCPCS (ALT 636 FOR OP/ED): Performed by: PHYSICIAN ASSISTANT

## 2025-05-07 PROCEDURE — 2500000001 HC RX 250 WO HCPCS SELF ADMINISTERED DRUGS (ALT 637 FOR MEDICARE OP): Performed by: PHYSICIAN ASSISTANT

## 2025-05-07 RX ORDER — MIDODRINE HYDROCHLORIDE 10 MG/1
10 TABLET ORAL
Qty: 42 TABLET | Refills: 0 | Status: SHIPPED | OUTPATIENT
Start: 2025-05-07 | End: 2025-05-21

## 2025-05-07 RX ADMIN — ACETAMINOPHEN 650 MG: 325 TABLET ORAL at 00:15

## 2025-05-07 RX ADMIN — Medication 1 TABLET: at 08:05

## 2025-05-07 RX ADMIN — Medication 50 MCG: at 08:05

## 2025-05-07 RX ADMIN — ACETAMINOPHEN 650 MG: 325 TABLET ORAL at 05:06

## 2025-05-07 RX ADMIN — ASPIRIN 81 MG: 81 TABLET, COATED ORAL at 08:05

## 2025-05-07 RX ADMIN — THIAMINE HCL TAB 100 MG 100 MG: 100 TAB at 05:06

## 2025-05-07 RX ADMIN — Medication 1 L/MIN: at 08:06

## 2025-05-07 RX ADMIN — CLOPIDOGREL 75 MG: 75 TABLET ORAL at 08:05

## 2025-05-07 RX ADMIN — HEPARIN SODIUM 5000 UNITS: 5000 INJECTION, SOLUTION INTRAVENOUS; SUBCUTANEOUS at 05:06

## 2025-05-07 ASSESSMENT — ENCOUNTER SYMPTOMS
WEAKNESS: 0
SORE THROAT: 0
CHILLS: 0
COUGH: 0
SPEECH DIFFICULTY: 1
CHEST TIGHTNESS: 0
WOUND: 0
ABDOMINAL PAIN: 0
WHEEZING: 0
FLANK PAIN: 0
SHORTNESS OF BREATH: 0
DIZZINESS: 0
FEVER: 0
NAUSEA: 0
DIARRHEA: 0
HEADACHES: 0
NUMBNESS: 0
PALPITATIONS: 0
FACIAL SWELLING: 0
EYE PAIN: 0
TROUBLE SWALLOWING: 0
JOINT SWELLING: 0
DYSURIA: 0
HALLUCINATIONS: 0
APPETITE CHANGE: 0
BACK PAIN: 0
VOMITING: 0
BLOOD IN STOOL: 0
FREQUENCY: 0
FATIGUE: 0
DIAPHORESIS: 0
HEMATURIA: 0
BRUISES/BLEEDS EASILY: 0
CONSTIPATION: 0
LIGHT-HEADEDNESS: 0

## 2025-05-07 ASSESSMENT — LIFESTYLE VARIABLES
PAROXYSMAL SWEATS: NO SWEAT VISIBLE
VISUAL DISTURBANCES: NOT PRESENT
ANXIETY: NO ANXIETY, AT EASE
NAUSEA AND VOMITING: NO NAUSEA AND NO VOMITING
HEADACHE, FULLNESS IN HEAD: NOT PRESENT
AGITATION: NORMAL ACTIVITY
ORIENTATION AND CLOUDING OF SENSORIUM: ORIENTED AND CAN DO SERIAL ADDITIONS
ANXIETY: NO ANXIETY, AT EASE
HEADACHE, FULLNESS IN HEAD: NOT PRESENT
TREMOR: NO TREMOR
PAROXYSMAL SWEATS: NO SWEAT VISIBLE
NAUSEA AND VOMITING: NO NAUSEA AND NO VOMITING
AUDITORY DISTURBANCES: NOT PRESENT
ANXIETY: NO ANXIETY, AT EASE
AUDITORY DISTURBANCES: NOT PRESENT
NAUSEA AND VOMITING: NO NAUSEA AND NO VOMITING
NAUSEA AND VOMITING: NO NAUSEA AND NO VOMITING
AUDITORY DISTURBANCES: NOT PRESENT
ANXIETY: NO ANXIETY, AT EASE
AGITATION: NORMAL ACTIVITY
ORIENTATION AND CLOUDING OF SENSORIUM: ORIENTED AND CAN DO SERIAL ADDITIONS
ANXIETY: NO ANXIETY, AT EASE
TREMOR: NO TREMOR
AGITATION: NORMAL ACTIVITY
AGITATION: NORMAL ACTIVITY
TOTAL SCORE: 0
ANXIETY: NO ANXIETY, AT EASE
VISUAL DISTURBANCES: NOT PRESENT
HEADACHE, FULLNESS IN HEAD: NOT PRESENT
TOTAL SCORE: 0
TOTAL SCORE: 0
HEADACHE, FULLNESS IN HEAD: NOT PRESENT
TOTAL SCORE: 0
VISUAL DISTURBANCES: NOT PRESENT
PAROXYSMAL SWEATS: NO SWEAT VISIBLE
ORIENTATION AND CLOUDING OF SENSORIUM: ORIENTED AND CAN DO SERIAL ADDITIONS
ORIENTATION AND CLOUDING OF SENSORIUM: ORIENTED AND CAN DO SERIAL ADDITIONS
VISUAL DISTURBANCES: NOT PRESENT
PAROXYSMAL SWEATS: NO SWEAT VISIBLE
HEADACHE, FULLNESS IN HEAD: NOT PRESENT
AGITATION: NORMAL ACTIVITY
PAROXYSMAL SWEATS: NO SWEAT VISIBLE
NAUSEA AND VOMITING: NO NAUSEA AND NO VOMITING
PAROXYSMAL SWEATS: NO SWEAT VISIBLE
TOTAL SCORE: 0
NAUSEA AND VOMITING: NO NAUSEA AND NO VOMITING
ORIENTATION AND CLOUDING OF SENSORIUM: ORIENTED AND CAN DO SERIAL ADDITIONS
TREMOR: NO TREMOR
AGITATION: NORMAL ACTIVITY
AUDITORY DISTURBANCES: NOT PRESENT
AUDITORY DISTURBANCES: NOT PRESENT
TREMOR: NO TREMOR
TREMOR: NO TREMOR
AUDITORY DISTURBANCES: NOT PRESENT
VISUAL DISTURBANCES: NOT PRESENT
HEADACHE, FULLNESS IN HEAD: NOT PRESENT
VISUAL DISTURBANCES: NOT PRESENT
TREMOR: NO TREMOR
ORIENTATION AND CLOUDING OF SENSORIUM: ORIENTED AND CAN DO SERIAL ADDITIONS
TOTAL SCORE: 0

## 2025-05-07 ASSESSMENT — PAIN - FUNCTIONAL ASSESSMENT
PAIN_FUNCTIONAL_ASSESSMENT: 0-10
PAIN_FUNCTIONAL_ASSESSMENT: 0-10

## 2025-05-07 ASSESSMENT — COGNITIVE AND FUNCTIONAL STATUS - GENERAL
DRESSING REGULAR LOWER BODY CLOTHING: A LITTLE
WALKING IN HOSPITAL ROOM: A LITTLE
DAILY ACTIVITIY SCORE: 22
MOBILITY SCORE: 22
CLIMB 3 TO 5 STEPS WITH RAILING: A LITTLE
HELP NEEDED FOR BATHING: A LITTLE

## 2025-05-07 ASSESSMENT — PAIN SCALES - GENERAL
PAINLEVEL_OUTOF10: 0 - NO PAIN

## 2025-05-07 NOTE — DISCHARGE SUMMARY
Discharge Diagnosis  Symptomatic stenosis of right carotid artery    Issues Requiring Follow-Up  TCAR  hypotension    Test Results Pending At Discharge  Pending Labs       No current pending labs.            Hospital Course   Patient presented to the hospital for R TCAR. He tolerated the procedure well. Post op he did develop some hypotension and was started on midodrine 3x daily. On date of discharge he was ambulating at baseline, his pain was well controlled, and he was tolerating a diet. He was discharged with followup with Dr. Crooks and 2 week prescription for midodrine which will be re-evaluated on followup.     Pertinent Physical Exam At Time of Discharge  Physical Exam  Constitutional:       General: He is not in acute distress.  HENT:      Head: Normocephalic and atraumatic.   Eyes:      Extraocular Movements: Extraocular movements intact.      Pupils: Pupils are equal, round, and reactive to light.   Neck:      Comments: Incision dry, clean, intact  Cardiovascular:      Rate and Rhythm: Normal rate and regular rhythm.   Pulmonary:      Effort: Pulmonary effort is normal.      Breath sounds: Normal breath sounds.   Abdominal:      General: There is no distension.      Palpations: Abdomen is soft.      Tenderness: There is no abdominal tenderness. There is no guarding or rebound.      Comments: Groin incision dry, clean, intact   Musculoskeletal:         General: Normal range of motion.      Right lower leg: No edema.      Left lower leg: No edema.   Skin:     General: Skin is warm and dry.   Neurological:      Mental Status: He is alert and oriented to person, place, and time. Mental status is at baseline.         Home Medications     Medication List      START taking these medications     acetaminophen 325 mg tablet; Commonly known as: Tylenol; Take 2 tablets   (650 mg) by mouth every 6 hours if needed for mild pain (1 - 3).   midodrine 10 mg tablet; Commonly known as: Proamatine; Take 1 tablet (10   mg) by  mouth 3 times daily (morning, midday, late afternoon) for 14 days.     CONTINUE taking these medications     albuterol 90 mcg/actuation inhaler; Inhale 2 puffs every 6 hours if   needed for wheezing.   aspirin 81 mg EC tablet; Take 1 tablet (81 mg) by mouth once daily.   atorvastatin 40 mg tablet; Commonly known as: Lipitor; Take 1 tablet (40   mg) by mouth once daily at bedtime.   cholecalciferol 50 mcg (2,000 units) tablet; Commonly known as: Vitamin   D-3   clopidogrel 75 mg tablet; Commonly known as: Plavix; Take 1 tablet (75   mg) by mouth once daily.   docusate sodium 50 mg capsule; Commonly known as: Colace   melatonin 5 mg tablet   multivitamin with minerals iron-free; Commonly known as: Centrum Silver   thiamine 100 mg tablet; Commonly known as: Vitamin B-1     ASK your doctor about these medications     sennosides-docusate sodium 8.6-50 mg tablet; Commonly known as:   [place]-Colace; Take 1 tablet by mouth once daily at bedtime.       Outpatient Follow-Up  Future Appointments   Date Time Provider Department Center   5/7/2025 12:00 PM Meryl Hoover OT [id number] [facility]   5/22/2025  9:30 AM Johanna Keen APRN-CNP [id number] [facility]   5/22/2025 10:30 AM Rohit Crooks DO [id number] [facility]   6/2/2025 11:00 AM Giles Finch MD [id number] [facility]   6/3/2025 11:00 AM POR [id number] CT [id number] [facility]       Omid Prasad DO

## 2025-05-07 NOTE — PROGRESS NOTES
Occupational Therapy                 Therapy Communication Note    Patient Name: Isaac Condon  MRN: [id number]  Today's Date: 5/7/2025     Discipline: Occupational Therapy    Missed Visit Reason:  No show    Missed Time: No Show

## 2025-05-07 NOTE — CARE PLAN
Problem: Discharge Planning  Goal: Discharge to home or other facility with appropriate resources  Outcome: Progressing     Problem: Pain - Adult  Goal: Verbalizes/displays adequate comfort level or baseline comfort level  Outcome: Met     Problem: Safety - Adult  Goal: Free from fall injury  Outcome: Met     Problem: Chronic Conditions and Co-morbidities  Goal: Patient's chronic conditions and co-morbidity symptoms are monitored and maintained or improved  Outcome: Met     Problem: Nutrition  Goal: Nutrient intake appropriate for maintaining nutritional needs  Outcome: Met     Problem: Fall/Injury  Goal: Not fall by end of shift  Outcome: Met  Goal: Be free from injury by end of the shift  Outcome: Met  Goal: Verbalize understanding of personal risk factors for fall in the hospital  Outcome: Met  Goal: Verbalize understanding of risk factor reduction measures to prevent injury from fall in the home  Outcome: Met  Goal: Use assistive devices by end of the shift  Outcome: Met  Goal: Pace activities to prevent fatigue by end of the shift  Outcome: Met   The patient's goals for the shift include  safety and comfort.     The clinical goals for the shift include pt will remain hemodynamically stable throughout shift

## 2025-05-07 NOTE — PROGRESS NOTES
Critical Care Daily Progress Notes        Subjective   Patient is a 70 y.o. male admitted on 5/5/2025  8:54 AM with the following indication(s) for ICU care: post operative care.     Interval History:     discharge held yesterday due to lower BP  No acute events over night  Feels well this am  BP stable last bp 125/83    Scheduled Medications:   Scheduled Medications[1]     Continuous Medications:   Continuous Medications[2]     PRN Medications:   PRN Medications[3]    Objective   Vitals:  Most Recent:  Vitals:    05/07/25 1000   BP: 125/83   Pulse: 68   Resp:    Temp:    SpO2: 100%       24hr Min/Max:  Temp  Min: 35.2 °C (95.4 °F)  Max: 36.3 °C (97.4 °F)  Pulse  Min: 50  Max: 77  BP  Min: 90/59  Max: 133/78  Resp  Min: 9  Max: 21  SpO2  Min: 85 %  Max: 100 %    LDA:  Arterial Line 05/05/25 Right Radial (Active)   Placement Date/Time: 05/05/25 (c) 1150   Size: 20 G  Orientation: Right  Location: Radial  Securement Method: Taped   Number of days: 0         Vent settings:       Hemodynamic parameters for last 24 hours:       I/O:    Intake/Output Summary (Last 24 hours) at 5/7/2025 1003  Last data filed at 5/7/2025 0900  Gross per 24 hour   Intake 850 ml   Output 1325 ml   Net -475 ml       Physical Exam:   Physical Exam  Vitals and nursing note reviewed.   Constitutional:       Appearance: Normal appearance.   HENT:      Head: Normocephalic and atraumatic.      Mouth/Throat:      Mouth: Mucous membranes are dry.   Eyes:      Extraocular Movements: Extraocular movements intact.      Pupils: Pupils are equal, round, and reactive to light.   Cardiovascular:      Rate and Rhythm: Normal rate and regular rhythm.      Pulses: Normal pulses.      Heart sounds: Normal heart sounds.   Abdominal:      General: There is no distension.      Palpations: Abdomen is soft.   Musculoskeletal:         General: No swelling.   Skin:     General: Skin is dry.      Capillary Refill: Capillary refill takes 2 to 3 seconds.   Neurological:       Mental Status: He is alert and oriented to person, place, and time.   Psychiatric:         Mood and Affect: Mood normal.         Behavior: Behavior normal.          Lab/Radiology/Diagnostic Review:  Results for orders placed or performed during the hospital encounter of 05/05/25 (from the past 24 hours)   CBC   Result Value Ref Range    WBC 6.5 4.4 - 11.3 x10*3/uL    nRBC 0.0 0.0 - 0.0 /100 WBCs    RBC 3.74 (L) 4.50 - 5.90 x10*6/uL    Hemoglobin 12.0 (L) 13.5 - 17.5 g/dL    Hematocrit 35.9 (L) 41.0 - 52.0 %    MCV 96 80 - 100 fL    MCH 32.1 26.0 - 34.0 pg    MCHC 33.4 32.0 - 36.0 g/dL    RDW 11.9 11.5 - 14.5 %    Platelets 182 150 - 450 x10*3/uL   Basic Metabolic Panel   Result Value Ref Range    Glucose 84 74 - 99 mg/dL    Sodium 136 136 - 145 mmol/L    Potassium 3.7 3.5 - 5.3 mmol/L    Chloride 105 98 - 107 mmol/L    Bicarbonate 26 21 - 32 mmol/L    Anion Gap 9 (L) 10 - 20 mmol/L    Urea Nitrogen 10 6 - 23 mg/dL    Creatinine 0.93 0.50 - 1.30 mg/dL    eGFR 89 >60 mL/min/1.73m*2    Calcium 8.4 (L) 8.6 - 10.3 mg/dL   Magnesium   Result Value Ref Range    Magnesium 1.81 1.60 - 2.40 mg/dL   Phosphorus   Result Value Ref Range    Phosphorus 3.5 2.5 - 4.9 mg/dL     Imaging  No results found.    Cardiology, Vascular, and Other Imaging  FL less than 1 hour  Result Date: 5/5/2025  These images are not reportable by radiology and will not be interpreted by  Radiologists.                       Assessment/Plan   Assessment & Plan  Symptomatic stenosis of right carotid artery    Pleasant 70-year-old male with underlying history of hypertension, dyslipidemia, COPD, cochlear implant, peripheral vascular disease with carotid stenosis with suspected acute ischemic stroke on 3/14/2025 with left facial droop.  He is currently in ICU after a schedule right TCAR.     Neuro              - A&Ox3   - at baseline     Respiratory              - Saturation well on room air     Cardiac/hemodynamics              - s/p right TCAR               - BP stable, on midodrine 10mg tid     Renal              - Renal function stable serum creatinine 0.9 mg/dL     GI              - tolerating diet     MSK              - worked with PT yesterday     Code Status: Full    Disposition per vascular team.  Luis Manuel Grover MD         [1] acetaminophen, 650 mg, oral, q6h CAIO  aspirin, 81 mg, oral, Daily  atorvastatin, 40 mg, oral, Nightly  cholecalciferol, 50 mcg, oral, Daily  clopidogrel, 75 mg, oral, Daily  heparin (porcine), 5,000 Units, subcutaneous, q8h  midodrine, 10 mg, oral, TID  multivitamin with minerals, 1 tablet, oral, Daily  oxygen, , inhalation, Continuous - Inhalation  sennosides-docusate sodium, 1 tablet, oral, Nightly  thiamine, 100 mg, oral, Daily     [2] niCARdipine, 2.5-15 mg/hr  phenylephrine, 0-2 mcg/kg/min, Last Rate: Stopped (05/06/25 1155)     [3] PRN medications: albuterol, melatonin, morphine, naloxone, ondansetron ODT **OR** ondansetron, oxyCODONE

## 2025-05-07 NOTE — CARE PLAN
The patient's goals for the shift include      The clinical goals for the shift include pt to remain hemodynamically stable throughout the shift      Problem: Discharge Planning  Goal: Discharge to home or other facility with appropriate resources  Outcome: Progressing     Problem: Pain  Goal: Takes deep breaths with improved pain control throughout the shift  Outcome: Progressing  Goal: Turns in bed with improved pain control throughout the shift  Outcome: Progressing  Goal: Walks with improved pain control throughout the shift  Outcome: Progressing  Goal: Performs ADL's with improved pain control throughout shift  Outcome: Progressing  Goal: Participates in PT with improved pain control throughout the shift  Outcome: Progressing  Goal: Free from opioid side effects throughout the shift  Outcome: Progressing  Goal: Free from acute confusion related to pain meds throughout the shift  Outcome: Progressing     Problem: Recent hospitalization or complication while living with CVA  Goal: Patient will effectively self-manage their CVA disease process  Outcome: Progressing

## 2025-05-07 NOTE — PROGRESS NOTES
Isaac Condon is a 70 y.o. male on day 2 of admission presenting with Symptomatic stenosis of right carotid artery.      Subjective   Isaac Condon is a 70 y.o. male with past medical history s/f PFO, hyperlipidemia, COPD, carotid stenosis with suspected acute ischemic stroke on 3/14/2025 with left facial droop, alcohol abuse (in remission), and lung nodules presented 5/5/25 after right TCAR. Medicine was consulted for medical management.      5/7/2025: No acute events overnight. Vitals stable, required some phenylephrine yesterday therefore started on midodrine and weaned off, monitored BP overnight, BP better at 133/78 this morning- was not given AM dose midodrine due to ok BP. CBC/BMP reviewed.   He is hopeful to go home today         Review of Systems   Constitutional:  Negative for appetite change, chills, diaphoresis, fatigue and fever.   HENT:  Negative for congestion, ear pain, facial swelling, hearing loss, nosebleeds, sore throat, tinnitus and trouble swallowing.    Eyes:  Negative for pain.   Respiratory:  Negative for cough, chest tightness, shortness of breath and wheezing.    Cardiovascular:  Negative for chest pain, palpitations and leg swelling.   Gastrointestinal:  Negative for abdominal pain, blood in stool, constipation, diarrhea, nausea and vomiting.   Genitourinary:  Negative for dysuria, flank pain, frequency, hematuria and urgency.   Musculoskeletal:  Negative for back pain and joint swelling.   Skin:  Negative for rash and wound.   Neurological:  Positive for speech difficulty (Chronic). Negative for dizziness, syncope, weakness, light-headedness, numbness and headaches.   Hematological:  Does not bruise/bleed easily.   Psychiatric/Behavioral:  Negative for behavioral problems, hallucinations and suicidal ideas.           Objective     Last Recorded Vitals  /78   Pulse 58   Temp 35.2 °C (95.4 °F) (Temporal)   Resp 18   Wt 67.9 kg (149 lb 11.1 oz)   SpO2 98%     Image  Results  Imaging  No results found.    Cardiology, Vascular, and Other Imaging  FL less than 1 hour  Result Date: 5/5/2025  These images are not reportable by radiology and will not be interpreted by  Radiologists.         Lab Results  Results for orders placed or performed during the hospital encounter of 05/05/25 (from the past 24 hours)   CBC   Result Value Ref Range    WBC 6.5 4.4 - 11.3 x10*3/uL    nRBC 0.0 0.0 - 0.0 /100 WBCs    RBC 3.74 (L) 4.50 - 5.90 x10*6/uL    Hemoglobin 12.0 (L) 13.5 - 17.5 g/dL    Hematocrit 35.9 (L) 41.0 - 52.0 %    MCV 96 80 - 100 fL    MCH 32.1 26.0 - 34.0 pg    MCHC 33.4 32.0 - 36.0 g/dL    RDW 11.9 11.5 - 14.5 %    Platelets 182 150 - 450 x10*3/uL   Basic Metabolic Panel   Result Value Ref Range    Glucose 84 74 - 99 mg/dL    Sodium 136 136 - 145 mmol/L    Potassium 3.7 3.5 - 5.3 mmol/L    Chloride 105 98 - 107 mmol/L    Bicarbonate 26 21 - 32 mmol/L    Anion Gap 9 (L) 10 - 20 mmol/L    Urea Nitrogen 10 6 - 23 mg/dL    Creatinine 0.93 0.50 - 1.30 mg/dL    eGFR 89 >60 mL/min/1.73m*2    Calcium 8.4 (L) 8.6 - 10.3 mg/dL   Magnesium   Result Value Ref Range    Magnesium 1.81 1.60 - 2.40 mg/dL   Phosphorus   Result Value Ref Range    Phosphorus 3.5 2.5 - 4.9 mg/dL        Medications  Scheduled medications:  Scheduled Medications[1]  Continuous medications:  Continuous Medications[2]  PRN medications:  PRN Medications[3]     Physical Exam  Constitutional:       General: He is not in acute distress.     Appearance: Normal appearance.   HENT:      Head: Normocephalic and atraumatic.      Right Ear: External ear normal.      Left Ear: External ear normal.      Nose: Nose normal.      Mouth/Throat:      Mouth: Mucous membranes are moist.      Pharynx: Oropharynx is clear.   Eyes:      Extraocular Movements: Extraocular movements intact.      Conjunctiva/sclera: Conjunctivae normal.      Pupils: Pupils are equal, round, and reactive to light.   Cardiovascular:      Rate and Rhythm: Normal  rate and regular rhythm.      Pulses: Normal pulses.      Heart sounds: Normal heart sounds.      Comments: Post-operative dressings C/D/I, did not remove dressing  NVS intact distal to operative site   Pulmonary:      Effort: Pulmonary effort is normal. No respiratory distress.      Breath sounds: Normal breath sounds. No wheezing, rhonchi or rales.   Abdominal:      General: Bowel sounds are normal.      Palpations: Abdomen is soft.      Tenderness: There is no abdominal tenderness. There is no right CVA tenderness, left CVA tenderness, guarding or rebound.   Musculoskeletal:         General: No swelling. Normal range of motion.      Cervical back: Normal range of motion and neck supple.   Skin:     General: Skin is warm and dry.      Capillary Refill: Capillary refill takes less than 2 seconds.      Findings: No lesion or rash.   Neurological:      Mental Status: He is alert and oriented to person, place, and time. Mental status is at baseline.      Comments: Chronic L facial droop  Chronic dysarthria   Psychiatric:         Mood and Affect: Mood normal.         Behavior: Behavior normal.                  Assessment/Plan      Right carotid artery stenosis s/p TCAR by Dr. Crooks on 5/5/25  Recent ischemic stroke 3/14/25, PFO by echo  HLD  DAPT  Statin  BP control  Pain medications  Rest of care per primary team and ICU consultation     COPD  Not in acute exacerbation  PRN albuterol     History of alcohol use  In remission    Lung nodules  Continue outpatient follow up    Code Status: Full Code                 DVT ppx: Per primary    Thank you for involving us in the care of this very pleasant patient. We will follow along with you while they remain admitted. Please contact Hospitalist service if any clarification needed.      Please see orders for more complete plan    Shantelle Cobian PA-C         [1] acetaminophen, 650 mg, oral, q6h CAIO  aspirin, 81 mg, oral, Daily  atorvastatin, 40 mg, oral,  Nightly  cholecalciferol, 50 mcg, oral, Daily  clopidogrel, 75 mg, oral, Daily  heparin (porcine), 5,000 Units, subcutaneous, q8h  midodrine, 10 mg, oral, TID  multivitamin with minerals, 1 tablet, oral, Daily  oxygen, , inhalation, Continuous - Inhalation  sennosides-docusate sodium, 1 tablet, oral, Nightly  thiamine, 100 mg, oral, Daily     [2] niCARdipine, 2.5-15 mg/hr  phenylephrine, 0-2 mcg/kg/min, Last Rate: Stopped (05/06/25 1155)     [3] PRN medications: albuterol, melatonin, morphine, naloxone, ondansetron ODT **OR** ondansetron, oxyCODONE

## 2025-05-08 ENCOUNTER — PATIENT OUTREACH (OUTPATIENT)
Dept: PRIMARY CARE | Facility: CLINIC | Age: 70
End: 2025-05-08
Payer: MEDICARE

## 2025-05-08 NOTE — PROGRESS NOTES
Discharge Facility:[facility]  Discharge Diagnosis:Symptomatic stenosis of right carotid artery   Admission Date:5/5/25  Discharge Date: 5/7/25    PCP Appointment Date:5/19/25  Specialist Appointment Date:   Hospital Encounter and Summary Linked: Yes/Admission (Discharged) with Rohit Crooks DO (05/05/2025)   See discharge assessment below for further details  Wrap Up  Wrap Up Additional Comments: discused discharge spoke to his wife she stated that he is improving . provided contact information encouraged call with questions. (5/8/2025  9:15 AM)    Engagement  Call Start Time: 0915 (5/8/2025  9:15 AM)    Medications  Medications reviewed with patient/caregiver?: Yes (tylenol proamatine 10 spoke to his wife) (5/8/2025  9:15 AM)  Is the patient having any side effects they believe may be caused by any medication additions or changes?: No (5/8/2025  9:15 AM)  Does the patient have all medications ordered at discharge?: Yes (5/8/2025  9:15 AM)  Is the patient taking all medications as directed (includes completed medication regime)?: Yes (5/8/2025  9:15 AM)    Appointments  Does the patient have a primary care provider?: Yes (5/8/2025  9:15 AM)  Care Management Interventions: Verified appointment date/time/provider (5/8/2025  9:15 AM)  Has the patient kept scheduled appointments due by today?: Yes (5/8/2025  9:15 AM)    Self Management  Has home health visited the patient within 72 hours of discharge?: Not applicable (5/8/2025  9:15 AM)  Has all Durable Medical Equipment (DME) been delivered?: No (5/8/2025  9:15 AM)    Patient Teaching  Does the patient have access to their discharge instructions?: Yes (spoke to his wife) (5/8/2025  9:15 AM)  Care Management Interventions: Reviewed instructions with patient (5/8/2025  9:15 AM)  What is the patient's perception of their health status since discharge?: Improving (spoke to his wife) (5/8/2025  9:15 AM)  Is the patient/caregiver able to teach back the hierarchy of who to  call/visit for symptoms/problems? PCP, Specialist, Home Health nurse, Urgent Care, ED, 911: Yes (spoke to his wife) (5/8/2025  9:15 AM)

## 2025-05-09 DIAGNOSIS — E55.9 VITAMIN D DEFICIENCY: ICD-10-CM

## 2025-05-09 DIAGNOSIS — E78.00 PURE HYPERCHOLESTEROLEMIA: ICD-10-CM

## 2025-05-09 DIAGNOSIS — I63.9 CEREBROVASCULAR ACCIDENT (CVA), UNSPECIFIED MECHANISM (MULTI): ICD-10-CM

## 2025-05-12 ENCOUNTER — APPOINTMENT (OUTPATIENT)
Dept: PRIMARY CARE | Facility: CLINIC | Age: 70
End: 2025-05-12
Payer: MEDICARE

## 2025-05-12 VITALS
TEMPERATURE: 96.9 F | HEART RATE: 53 BPM | OXYGEN SATURATION: 96 % | WEIGHT: 151 LBS | RESPIRATION RATE: 14 BRPM | BODY MASS INDEX: 20.48 KG/M2 | DIASTOLIC BLOOD PRESSURE: 81 MMHG | SYSTOLIC BLOOD PRESSURE: 143 MMHG

## 2025-05-12 DIAGNOSIS — Z98.62 H/O TRANSCAROTID ARTERY REVASCULARIZATION (TCAR): ICD-10-CM

## 2025-05-12 DIAGNOSIS — I77.9 BILATERAL CAROTID ARTERY DISEASE, UNSPECIFIED TYPE: Primary | ICD-10-CM

## 2025-05-12 DIAGNOSIS — I95.81 POSTPROCEDURAL HYPOTENSION: ICD-10-CM

## 2025-05-12 PROCEDURE — 4004F PT TOBACCO SCREEN RCVD TLK: CPT | Performed by: FAMILY MEDICINE

## 2025-05-12 PROCEDURE — 1159F MED LIST DOCD IN RCRD: CPT | Performed by: FAMILY MEDICINE

## 2025-05-12 PROCEDURE — 1160F RVW MEDS BY RX/DR IN RCRD: CPT | Performed by: FAMILY MEDICINE

## 2025-05-12 PROCEDURE — 1111F DSCHRG MED/CURRENT MED MERGE: CPT | Performed by: FAMILY MEDICINE

## 2025-05-12 PROCEDURE — 99495 TRANSJ CARE MGMT MOD F2F 14D: CPT | Performed by: FAMILY MEDICINE

## 2025-05-12 NOTE — PROGRESS NOTES
"Patient: Isaac Condon  : 1955  PCP: Giles Finch MD  MRN: [id number]  Program: Transitional Care Management  Status: Enrolled  Effective Dates: 2025 - present  Responsible Staff: Karin Yu LPN  Social Drivers to be Addressed: Physical Activity, Social Connections, Stress, Tobacco Use         Isaac Condon is a 70 y.o. male presenting today for follow-up after being discharged from the hospital 5 days ago. The main problem requiring admission was carotid artery occlusion with surgical intervention. The discharge summary and/or Transitional Care Management documentation was reviewed. Medication reconciliation was performed as indicated via the \"Cresencio as Reviewed\" timestamp.     Isaac Condon was contacted by Transitional Care Management services two days after his discharge. This encounter and supporting documentation was reviewed.    Review of Systems    /81   Pulse 53   Temp 36.1 °C (96.9 °F)   Resp 14   Wt 68.5 kg (151 lb)   SpO2 96%   BMI 20.48 kg/m²     Physical Exam  Vitals and nursing note reviewed.   Constitutional:       General: He is not in acute distress.     Appearance: Normal appearance. He is not ill-appearing.   HENT:      Head: Normocephalic and atraumatic.      Right Ear: Tympanic membrane, ear canal and external ear normal.      Left Ear: Tympanic membrane, ear canal and external ear normal.      Mouth/Throat:      Pharynx: Oropharynx is clear.   Eyes:      Extraocular Movements: Extraocular movements intact.   Neck:      Vascular: No carotid bruit.      Comments: Healing surgical site right lateral neck near clavicle  Cardiovascular:      Rate and Rhythm: Normal rate and regular rhythm.      Pulses: Normal pulses.      Heart sounds: Normal heart sounds.   Pulmonary:      Effort: Pulmonary effort is normal.      Breath sounds: Normal breath sounds.   Abdominal:      General: Abdomen is flat. Bowel sounds are normal.      Palpations: Abdomen is soft.      " Tenderness: There is no abdominal tenderness.   Musculoskeletal:         General: Normal range of motion.      Cervical back: Neck supple.   Skin:     General: Skin is warm.   Neurological:      Mental Status: He is alert and oriented to person, place, and time. Mental status is at baseline.      Motor: No weakness.      Gait: Gait normal.   Psychiatric:         Mood and Affect: Mood normal.     Muscle reports reviewed with patient and wife    Postprocedural hypotension for which patient was started on midodrine blood pressure in office stable for now continue it as directed upon discharge monitor blood pressure at home call if getting blood pressure readings consistently above 140/90 otherwise keep regular follow-up appointment with neurology and vascular surgery in about 10 days for further instructions.        Follow-up appointment in our office first week of June with fasting labs    The complexity of medical decision making for this patient's transitional care is moderate.    Assessment/Plan   Problem List Items Addressed This Visit           ICD-10-CM    Bilateral carotid artery disease - Primary I77.9    Status post right TCAR         Postprocedural hypotension I95.81    BP stable on midodrine they will continue as prescribed upon hospital discharge continue to monitor BP at home call if blood pressure readings are greater than 140/90 or below 90/60.  Further plans pending vascular surgery input in about 10 days at their follow-up         H/O transcarotid artery revascularization (TCAR) Z98.62    Status post right TCAR incision site healing.  Follow through with vascular surgery in about 10 days.

## 2025-05-12 NOTE — ASSESSMENT & PLAN NOTE
BP stable on midodrine they will continue as prescribed upon hospital discharge continue to monitor BP at home call if blood pressure readings are greater than 140/90 or below 90/60.  Further plans pending vascular surgery input in about 10 days at their follow-up

## 2025-05-12 NOTE — ASSESSMENT & PLAN NOTE
Status post right TCAR incision site healing.  Follow through with vascular surgery in about 10 days.

## 2025-05-19 ENCOUNTER — APPOINTMENT (OUTPATIENT)
Dept: PRIMARY CARE | Facility: CLINIC | Age: 70
End: 2025-05-19
Payer: MEDICARE

## 2025-05-20 ENCOUNTER — PATIENT OUTREACH (OUTPATIENT)
Dept: PRIMARY CARE | Facility: CLINIC | Age: 70
End: 2025-05-20
Payer: MEDICARE

## 2025-05-20 NOTE — PROGRESS NOTES
Confirmation of at least 2 patient identifiers.    Completed telephonic follow-up with patient after recent visit with DR Park  Spoke to Spouse/significant other during outreach call.    Patient reports feeling: Improved    Patient has questions or concerns about medications: No    Have all prescribed medications been filled? Yes    Patient has necessary resources to manage their care? Yes    Patient has questions or concerns? No    Next care management follow-up approximately within one month.  Care  information provided to patient.

## 2025-05-22 ENCOUNTER — APPOINTMENT (OUTPATIENT)
Dept: NEUROLOGY | Facility: HOSPITAL | Age: 70
End: 2025-05-22
Payer: MEDICARE

## 2025-05-22 ENCOUNTER — OFFICE VISIT (OUTPATIENT)
Dept: VASCULAR SURGERY | Facility: HOSPITAL | Age: 70
End: 2025-05-22
Payer: MEDICARE

## 2025-05-22 VITALS
SYSTOLIC BLOOD PRESSURE: 142 MMHG | HEART RATE: 76 BPM | WEIGHT: 149 LBS | BODY MASS INDEX: 20.21 KG/M2 | DIASTOLIC BLOOD PRESSURE: 88 MMHG

## 2025-05-22 DIAGNOSIS — I65.21 CAROTID STENOSIS, RIGHT: Primary | ICD-10-CM

## 2025-05-22 PROCEDURE — 1111F DSCHRG MED/CURRENT MED MERGE: CPT | Performed by: SURGERY

## 2025-05-22 PROCEDURE — 1159F MED LIST DOCD IN RCRD: CPT | Performed by: SURGERY

## 2025-05-22 PROCEDURE — 99211 OFF/OP EST MAY X REQ PHY/QHP: CPT | Performed by: SURGERY

## 2025-05-22 PROCEDURE — 4004F PT TOBACCO SCREEN RCVD TLK: CPT | Performed by: SURGERY

## 2025-05-22 NOTE — PROGRESS NOTES
Subjective   Patient ID: Isaac Condon is a 70 y.o. male who presents for Post-op (TCAR/Carotid ).  HPI  Patient is postop right TCAR  No new symptoms noted minimal pain or discomfort  No new neurologic deficits noted  Still some expressive aphasia however improving  Family member present  Review of Systems    Objective   Physical Exam  Right cervical incision clean dry intact  Neurologic exam grossly intact with no changes from pre and post tcar    Assessment/Plan   Right carotid stenosis  Status post TCAR  Carotid duplex ordered 4 to 6 weeks  Follow-up after testing  Continue antiplatelet therapy         Rohit Crooks DO 05/22/25 11:20 AM

## 2025-05-24 LAB
25(OH)D3+25(OH)D2 SERPL-MCNC: 52 NG/ML (ref 30–100)
ALBUMIN SERPL-MCNC: 4.6 G/DL (ref 3.6–5.1)
ALP SERPL-CCNC: 116 U/L (ref 35–144)
ALT SERPL-CCNC: 15 U/L (ref 9–46)
ANION GAP SERPL CALCULATED.4IONS-SCNC: 10 MMOL/L (CALC) (ref 7–17)
AST SERPL-CCNC: 19 U/L (ref 10–35)
BILIRUB SERPL-MCNC: 0.7 MG/DL (ref 0.2–1.2)
BUN SERPL-MCNC: 10 MG/DL (ref 7–25)
CALCIUM SERPL-MCNC: 9.9 MG/DL (ref 8.6–10.3)
CHLORIDE SERPL-SCNC: 100 MMOL/L (ref 98–110)
CHOLEST SERPL-MCNC: 153 MG/DL
CHOLEST/HDLC SERPL: 2.4 (CALC)
CO2 SERPL-SCNC: 28 MMOL/L (ref 20–32)
CREAT SERPL-MCNC: 0.97 MG/DL (ref 0.7–1.35)
EGFRCR SERPLBLD CKD-EPI 2021: 85 ML/MIN/1.73M2
ERYTHROCYTE [DISTWIDTH] IN BLOOD BY AUTOMATED COUNT: 11.6 % (ref 11–15)
GLUCOSE SERPL-MCNC: 90 MG/DL (ref 65–99)
HCT VFR BLD AUTO: 43.7 % (ref 38.5–50)
HDLC SERPL-MCNC: 64 MG/DL
HGB BLD-MCNC: 14.4 G/DL (ref 13.2–17.1)
LDLC SERPL CALC-MCNC: 72 MG/DL (CALC)
MCH RBC QN AUTO: 32.7 PG (ref 27–33)
MCHC RBC AUTO-ENTMCNC: 33 G/DL (ref 32–36)
MCV RBC AUTO: 99.1 FL (ref 80–100)
NONHDLC SERPL-MCNC: 89 MG/DL (CALC)
PLATELET # BLD AUTO: 241 THOUSAND/UL (ref 140–400)
PMV BLD REES-ECKER: 8.7 FL (ref 7.5–12.5)
POTASSIUM SERPL-SCNC: 4.5 MMOL/L (ref 3.5–5.3)
PROT SERPL-MCNC: 6.8 G/DL (ref 6.1–8.1)
RBC # BLD AUTO: 4.41 MILLION/UL (ref 4.2–5.8)
SODIUM SERPL-SCNC: 138 MMOL/L (ref 135–146)
TRIGL SERPL-MCNC: 83 MG/DL
WBC # BLD AUTO: 6.9 THOUSAND/UL (ref 3.8–10.8)

## 2025-06-02 ENCOUNTER — APPOINTMENT (OUTPATIENT)
Dept: PRIMARY CARE | Facility: CLINIC | Age: 70
End: 2025-06-02
Payer: COMMERCIAL

## 2025-06-02 VITALS
HEART RATE: 79 BPM | RESPIRATION RATE: 14 BRPM | DIASTOLIC BLOOD PRESSURE: 79 MMHG | SYSTOLIC BLOOD PRESSURE: 113 MMHG | WEIGHT: 150 LBS | OXYGEN SATURATION: 93 % | TEMPERATURE: 96.9 F | BODY MASS INDEX: 20.34 KG/M2

## 2025-06-02 DIAGNOSIS — J42 CHRONIC BRONCHITIS, UNSPECIFIED CHRONIC BRONCHITIS TYPE (MULTI): ICD-10-CM

## 2025-06-02 DIAGNOSIS — E78.00 PURE HYPERCHOLESTEROLEMIA: ICD-10-CM

## 2025-06-02 DIAGNOSIS — E87.1 HYPONATREMIA: ICD-10-CM

## 2025-06-02 DIAGNOSIS — I63.9 CEREBROVASCULAR ACCIDENT (CVA), UNSPECIFIED MECHANISM (MULTI): ICD-10-CM

## 2025-06-02 DIAGNOSIS — Z12.5 SCREENING FOR PROSTATE CANCER: Primary | ICD-10-CM

## 2025-06-02 DIAGNOSIS — I95.81 POSTPROCEDURAL HYPOTENSION: ICD-10-CM

## 2025-06-02 DIAGNOSIS — E55.9 VITAMIN D DEFICIENCY: ICD-10-CM

## 2025-06-02 PROBLEM — Z13.220 SCREENING FOR HYPERLIPIDEMIA: Status: RESOLVED | Noted: 2023-04-28 | Resolved: 2025-06-02

## 2025-06-02 PROBLEM — Z13.29 THYROID DISORDER SCREEN: Status: RESOLVED | Noted: 2023-04-28 | Resolved: 2025-06-02

## 2025-06-02 PROBLEM — F17.200 CURRENT SMOKER: Status: RESOLVED | Noted: 2024-05-14 | Resolved: 2025-06-02

## 2025-06-02 PROCEDURE — 1160F RVW MEDS BY RX/DR IN RCRD: CPT | Performed by: FAMILY MEDICINE

## 2025-06-02 PROCEDURE — 99214 OFFICE O/P EST MOD 30 MIN: CPT | Performed by: FAMILY MEDICINE

## 2025-06-02 PROCEDURE — 1159F MED LIST DOCD IN RCRD: CPT | Performed by: FAMILY MEDICINE

## 2025-06-02 PROCEDURE — G2211 COMPLEX E/M VISIT ADD ON: HCPCS | Performed by: FAMILY MEDICINE

## 2025-06-02 PROCEDURE — 4004F PT TOBACCO SCREEN RCVD TLK: CPT | Performed by: FAMILY MEDICINE

## 2025-06-02 PROCEDURE — 1111F DSCHRG MED/CURRENT MED MERGE: CPT | Performed by: FAMILY MEDICINE

## 2025-06-02 RX ORDER — CLOPIDOGREL BISULFATE 75 MG/1
75 TABLET ORAL DAILY
Qty: 90 TABLET | Refills: 1 | Status: SHIPPED | OUTPATIENT
Start: 2025-06-02 | End: 2026-06-02

## 2025-06-02 RX ORDER — ATORVASTATIN CALCIUM 40 MG/1
40 TABLET, FILM COATED ORAL NIGHTLY
Qty: 90 TABLET | Refills: 1 | Status: SHIPPED | OUTPATIENT
Start: 2025-06-02 | End: 2026-06-02

## 2025-06-02 ASSESSMENT — ENCOUNTER SYMPTOMS
CONFUSION: 0
CHEST TIGHTNESS: 0
ABDOMINAL PAIN: 0
SHORTNESS OF BREATH: 0
CHILLS: 0
ARTHRALGIAS: 0
PALPITATIONS: 0
FEVER: 0

## 2025-06-02 NOTE — PROGRESS NOTES
Subjective   Patient ID: Isaac Condon is a 70 y.o. male who presents for Follow-up (4 month ).    HPI patient today with his wife for follow-up overall doing well under the circumstances.  Still not quite as talkative and expressing himself as natural as he used to prior to the stroke.  But otherwise stable.  States he is made mention at home about returning to driving.  She is not sure he is ready.  She states previous specialist had told him he could take up to a year to see how he is doing.    Review of Systems   Constitutional:  Negative for chills and fever.   HENT:  Negative for congestion and ear pain.    Eyes:  Negative for visual disturbance.   Respiratory:  Negative for chest tightness and shortness of breath.    Cardiovascular:  Negative for chest pain and palpitations.   Gastrointestinal:  Negative for abdominal pain.   Musculoskeletal:  Negative for arthralgias.   Skin:  Negative for pallor.   Psychiatric/Behavioral:  Negative for confusion.        Objective   /79   Pulse 79   Temp 36.1 °C (96.9 °F)   Resp 14   Wt 68 kg (150 lb)   SpO2 93%   BMI 20.34 kg/m²     Physical Exam  Vitals and nursing note reviewed.   Constitutional:       General: He is not in acute distress.     Appearance: Normal appearance. He is not ill-appearing.   HENT:      Head: Normocephalic and atraumatic.      Right Ear: Tympanic membrane, ear canal and external ear normal.      Left Ear: Tympanic membrane, ear canal and external ear normal.      Mouth/Throat:      Pharynx: Oropharynx is clear.   Eyes:      Extraocular Movements: Extraocular movements intact.   Cardiovascular:      Rate and Rhythm: Normal rate and regular rhythm.      Pulses: Normal pulses.      Heart sounds: Normal heart sounds.   Pulmonary:      Effort: Pulmonary effort is normal.      Breath sounds: Normal breath sounds.   Abdominal:      General: Abdomen is flat. Bowel sounds are normal.      Palpations: Abdomen is soft.      Tenderness: There is  no abdominal tenderness.   Musculoskeletal:         General: Normal range of motion.      Cervical back: Neck supple.   Skin:     General: Skin is warm.   Neurological:      Mental Status: He is alert. Mental status is at baseline.   Psychiatric:         Mood and Affect: Mood normal.       Recent Results (from the past 6 weeks)   ECG 12 Lead    Collection Time: 04/22/25 12:24 PM   Result Value Ref Range    Ventricular Rate 82 BPM    Atrial Rate 82 BPM    DC Interval 156 ms    QRS Duration 80 ms    QT Interval 362 ms    QTC Calculation(Bazett) 422 ms    P Axis 87 degrees    R Axis 68 degrees    T Axis 84 degrees    QRS Count 14 beats    Q Onset 228 ms    P Onset 150 ms    P Offset 207 ms    T Offset 409 ms    QTC Fredericia 401 ms   Type and screen    Collection Time: 05/05/25  9:44 AM   Result Value Ref Range    ABO TYPE O     Rh TYPE POS     ANTIBODY SCREEN NEG    ACTIVATED CLOTTING TIME LOW    Collection Time: 05/05/25 11:58 AM   Result Value Ref Range    POCT Activated Clotting Time Low Range 154 89 - 169 sec   ACTIVATED CLOTTING TIME LOW    Collection Time: 05/05/25 12:09 PM   Result Value Ref Range    POCT Activated Clotting Time Low Range 290 (H) 89 - 169 sec   ACTIVATED CLOTTING TIME LOW    Collection Time: 05/05/25 12:38 PM   Result Value Ref Range    POCT Activated Clotting Time Low Range 167 89 - 169 sec   Basic Metabolic Panel    Collection Time: 05/06/25  4:57 AM   Result Value Ref Range    Glucose 101 (H) 74 - 99 mg/dL    Sodium 134 (L) 136 - 145 mmol/L    Potassium 3.8 3.5 - 5.3 mmol/L    Chloride 103 98 - 107 mmol/L    Bicarbonate 23 21 - 32 mmol/L    Anion Gap 12 10 - 20 mmol/L    Urea Nitrogen 8 6 - 23 mg/dL    Creatinine 0.85 0.50 - 1.30 mg/dL    eGFR >90 >60 mL/min/1.73m*2    Calcium 8.6 8.6 - 10.3 mg/dL   CBC    Collection Time: 05/06/25  4:57 AM   Result Value Ref Range    WBC 11.3 4.4 - 11.3 x10*3/uL    nRBC 0.0 0.0 - 0.0 /100 WBCs    RBC 3.76 (L) 4.50 - 5.90 x10*6/uL    Hemoglobin 12.2 (L)  13.5 - 17.5 g/dL    Hematocrit 34.9 (L) 41.0 - 52.0 %    MCV 93 80 - 100 fL    MCH 32.4 26.0 - 34.0 pg    MCHC 35.0 32.0 - 36.0 g/dL    RDW 11.7 11.5 - 14.5 %    Platelets 226 150 - 450 x10*3/uL   Magnesium    Collection Time: 05/06/25  4:57 AM   Result Value Ref Range    Magnesium 1.71 1.60 - 2.40 mg/dL   Phosphorus    Collection Time: 05/06/25  4:57 AM   Result Value Ref Range    Phosphorus 3.3 2.5 - 4.9 mg/dL   CBC    Collection Time: 05/07/25  4:28 AM   Result Value Ref Range    WBC 6.5 4.4 - 11.3 x10*3/uL    nRBC 0.0 0.0 - 0.0 /100 WBCs    RBC 3.74 (L) 4.50 - 5.90 x10*6/uL    Hemoglobin 12.0 (L) 13.5 - 17.5 g/dL    Hematocrit 35.9 (L) 41.0 - 52.0 %    MCV 96 80 - 100 fL    MCH 32.1 26.0 - 34.0 pg    MCHC 33.4 32.0 - 36.0 g/dL    RDW 11.9 11.5 - 14.5 %    Platelets 182 150 - 450 x10*3/uL   Basic Metabolic Panel    Collection Time: 05/07/25  4:28 AM   Result Value Ref Range    Glucose 84 74 - 99 mg/dL    Sodium 136 136 - 145 mmol/L    Potassium 3.7 3.5 - 5.3 mmol/L    Chloride 105 98 - 107 mmol/L    Bicarbonate 26 21 - 32 mmol/L    Anion Gap 9 (L) 10 - 20 mmol/L    Urea Nitrogen 10 6 - 23 mg/dL    Creatinine 0.93 0.50 - 1.30 mg/dL    eGFR 89 >60 mL/min/1.73m*2    Calcium 8.4 (L) 8.6 - 10.3 mg/dL   Magnesium    Collection Time: 05/07/25  4:28 AM   Result Value Ref Range    Magnesium 1.81 1.60 - 2.40 mg/dL   Phosphorus    Collection Time: 05/07/25  4:28 AM   Result Value Ref Range    Phosphorus 3.5 2.5 - 4.9 mg/dL   CBC    Collection Time: 05/23/25 11:05 AM   Result Value Ref Range    WHITE BLOOD CELL COUNT 6.9 3.8 - 10.8 Thousand/uL    RED BLOOD CELL COUNT 4.41 4.20 - 5.80 Million/uL    HEMOGLOBIN 14.4 13.2 - 17.1 g/dL    HEMATOCRIT 43.7 38.5 - 50.0 %    MCV 99.1 80.0 - 100.0 fL    MCH 32.7 27.0 - 33.0 pg    MCHC 33.0 32.0 - 36.0 g/dL    RDW 11.6 11.0 - 15.0 %    PLATELET COUNT 241 140 - 400 Thousand/uL    MPV 8.7 7.5 - 12.5 fL   Comprehensive Metabolic Panel    Collection Time: 05/23/25 11:05 AM   Result Value  Ref Range    GLUCOSE 90 65 - 99 mg/dL    UREA NITROGEN (BUN) 10 7 - 25 mg/dL    CREATININE 0.97 0.70 - 1.35 mg/dL    EGFR 85 > OR = 60 mL/min/1.73m2    SODIUM 138 135 - 146 mmol/L    POTASSIUM 4.5 3.5 - 5.3 mmol/L    CHLORIDE 100 98 - 110 mmol/L    CARBON DIOXIDE 28 20 - 32 mmol/L    ELECTROLYTE BALANCE 10 7 - 17 mmol/L (calc)    CALCIUM 9.9 8.6 - 10.3 mg/dL    PROTEIN, TOTAL 6.8 6.1 - 8.1 g/dL    ALBUMIN 4.6 3.6 - 5.1 g/dL    BILIRUBIN, TOTAL 0.7 0.2 - 1.2 mg/dL    ALKALINE PHOSPHATASE 116 35 - 144 U/L    AST 19 10 - 35 U/L    ALT 15 9 - 46 U/L   Lipid Panel    Collection Time: 05/23/25 11:05 AM   Result Value Ref Range    CHOLESTEROL, TOTAL 153 <200 mg/dL    HDL CHOLESTEROL 64 > OR = 40 mg/dL    TRIGLYCERIDES 83 <150 mg/dL    LDL-CHOLESTEROL 72 mg/dL (calc)    CHOL/HDLC RATIO 2.4 <5.0 (calc)    NON HDL CHOLESTEROL 89 <130 mg/dL (calc)   Vitamin D 25-Hydroxy,Total (for eval of Vitamin D levels)    Collection Time: 05/23/25 11:05 AM   Result Value Ref Range    VITAMIN D,25-OH,TOTAL,IA 52 30 - 100 ng/mL     Recent labs reviewed overall numbers are stable we will continue current medications and dietary modifications    Continue to follow with his specialist    Regards to driving I suggested that they give it some more time.  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    Return to office 3 months with repeat fasting    Assessment/Plan   Problem List Items Addressed This Visit           ICD-10-CM    Chronic bronchitis (Multi) J42    Clinically stable patient scheduled for CT lung screening.  He is no longer smoking.         Relevant Orders    CBC    Comprehensive Metabolic Panel    Follow Up In Primary Care - Established    Hyponatremia E87.1    Stable continue to monitor         Relevant Orders    CBC    Comprehensive Metabolic Panel    TSH with reflex  to Free T4 if abnormal    Follow Up In Primary Care - Established    Vitamin D deficiency E55.9    Monitor supplement as needed         Relevant Orders    Vitamin D 25-Hydroxy,Total (for eval of Vitamin D levels)    Screening for prostate cancer - Primary Z12.5    Screening PSA         Relevant Orders    Prostate Specific Antigen, Screen    Cerebrovascular accident (CVA) (Multi) I63.9    Patient clinically stable slowly improving still struggles some with speech.    Also inquired about returning to driving for now I would recommend that he hold off and give his body some more time to heal moving forward if he really wants to pursue going back to driving I explained to them they did not feel comfortable signing off on that that I would want formal retesting performed through the Department of Motor Vehicles.  Wife says she will touch base with them to find out the process.         Relevant Medications    atorvastatin (Lipitor) 40 mg tablet    clopidogrel (Plavix) 75 mg tablet    Other Relevant Orders    TSH with reflex to Free T4 if abnormal    Follow Up In Primary Care - Established    Pure hypercholesterolemia E78.00    Stable continue atorvastatin 40 mg daily         Relevant Orders    Comprehensive Metabolic Panel    Lipid Panel    TSH with reflex to Free T4 if abnormal    Follow Up In Primary Care - Established    Postprocedural hypotension I95.81    BP stable off of midodrine continue to monitor

## 2025-06-02 NOTE — ASSESSMENT & PLAN NOTE
Patient clinically stable slowly improving still struggles some with speech.    Also inquired about returning to driving for now I would recommend that he hold off and give his body some more time to heal moving forward if he really wants to pursue going back to driving I explained to them they did not feel comfortable signing off on that that I would want formal retesting performed through the Department of Motor Vehicles.  Wife says she will touch base with them to find out the process.

## 2025-06-03 ENCOUNTER — HOSPITAL ENCOUNTER (OUTPATIENT)
Dept: RADIOLOGY | Facility: CLINIC | Age: 70
Discharge: HOME | End: 2025-06-03
Payer: MEDICARE

## 2025-06-03 DIAGNOSIS — F17.200 CURRENT SMOKER: ICD-10-CM

## 2025-06-03 DIAGNOSIS — Z87.891 PERSONAL HISTORY OF NICOTINE DEPENDENCE: ICD-10-CM

## 2025-06-03 DIAGNOSIS — R91.8 LUNG NODULES: ICD-10-CM

## 2025-06-03 PROCEDURE — 71271 CT THORAX LUNG CANCER SCR C-: CPT | Performed by: RADIOLOGY

## 2025-06-03 PROCEDURE — 71271 CT THORAX LUNG CANCER SCR C-: CPT

## 2025-06-04 ENCOUNTER — PATIENT OUTREACH (OUTPATIENT)
Dept: PRIMARY CARE | Facility: CLINIC | Age: 70
End: 2025-06-04
Payer: MEDICARE

## 2025-06-05 DIAGNOSIS — Z87.891 PERSONAL HISTORY OF NICOTINE DEPENDENCE: ICD-10-CM

## 2025-06-05 DIAGNOSIS — R91.8 LUNG NODULES: Primary | ICD-10-CM

## 2025-06-18 ENCOUNTER — EVALUATION (OUTPATIENT)
Dept: SPEECH THERAPY | Facility: HOSPITAL | Age: 70
End: 2025-06-18
Payer: MEDICARE

## 2025-06-18 ENCOUNTER — DOCUMENTATION (OUTPATIENT)
Dept: OCCUPATIONAL THERAPY | Facility: CLINIC | Age: 70
End: 2025-06-18

## 2025-06-18 DIAGNOSIS — I63.9 CEREBRAL INFARCTION, UNSPECIFIED: ICD-10-CM

## 2025-06-18 DIAGNOSIS — I69.322 DYSARTHRIA AS LATE EFFECT OF CEREBELLAR CEREBROVASCULAR ACCIDENT (CVA): Primary | ICD-10-CM

## 2025-06-18 PROCEDURE — 92523 SPEECH SOUND LANG COMPREHEN: CPT | Mod: GN

## 2025-06-18 ASSESSMENT — PAIN - FUNCTIONAL ASSESSMENT: PAIN_FUNCTIONAL_ASSESSMENT: 0-10

## 2025-06-18 ASSESSMENT — PAIN SCALES - GENERAL: PAINLEVEL_OUTOF10: 0 - NO PAIN

## 2025-06-18 NOTE — PROGRESS NOTES
Speech-Language Pathology Speech/Language/Cognition Evaluation    Patient Name: Isaac Condon  MRN: [id number]  : 1955  Today's Date: 2025  Time Calculation  Start Time: 0900  Stop Time: 0950  Time Calculation (min): 50 min      ASSESSMENT  Impressions:  Pt presents with mild to moderate dysarthria, partially due to effects of CVA and partially due to being edentulous. The pt demonstrated reduced lingual and labial strength and coordination. The patient is stimulable for proper positioning of tongue and lips for production of speech sounds.    Skilled speech therapy is medically necessary and ordered by a physician/MIRELA in order to maximize speech intelligibility, reduce dependence on others, and improve quality of life. Without skilled speech therapy, pt is at risk for isolation and depression.    Prognosis: Fair      PLAN    Recommended frequency/duration:  Skilled SLP services recommended: Yes  Frequency: 1x/week  Duration: 6 weeks  Treatment/Interventions: Articulation, Oral motor exercises, Patient/family education  Strengths: Family/caregiver support and Low severity of deficits  Barriers to participation in tx: N/A    Payor authorization information:  Payor: MEDICARE / Plan: MEDICARE PART A AND B / Product Type: *No Product type* /   Certification Period Start Date: 25  Certification Period End Date: 26  Number of Authorized Treatments : medical necessity  Total Number of Visits : 1    Short Term Goals: -25  Pt will improve speech intelligibility to 90% given min cues for use of intelligibility strategies.  Pt will imitate consonants/vowels/single words with 95 acc given min cues.  Pt will complete lingual and labial strengthening exercises with accuracy on 4/5 repetitions and with good effort, given min cues.    Status: Goal initiated   Progress this date: N/A    Long-Term Goals:  Pt will independently use strategies for increasing speech intelligibility with 95% acc  Pt  will independently demonstrate lingual and labial exercises with 100% acc  Pt will improve speech intelligibility to 95% in short phrases.   Status: Goal initiated   Progress this date: N/A      SUBJECTIVE    PMHx relevant to rehab:   This patient experienced a CVA in 3/2025 with resultant L facial droop, labial and lingual impairment and dysarthric speech. The patient has received speech therapy while in acute care and at an Acute Rehab facility. The patient has been discharged home and is wanting to continue speech therapy to increase intelligibility of his speech.         Living Environment: Home with spouse  Date of onset: 3/16/25  Date of order: 3/27/25    Pain Assessment  Pain Assessment: 0-10  0-10 (Numeric) Pain Score: 0 - No pain      OBJECTIVE    Oral motor:  General appearance: Slight open mouth posture with reduced labial symmetry but no droop   Lingual: Cannot produce a tongue tip, tongue is flat and round, ROM is adequate.   Labial: Reduced ROM and strength  Palatal: DNT    Motor speech:  Intelligibility: 80% in conversation, increase in intelligibility with cues to overarticulate  Typical utterance length:   Motor speech characteristics: Reduced amplitude, Consonant distortions  Perceptual voice characteristics: Weak    Receptive language: WFL all subsets  Following 1-step commands:   Following 2-step commands:   Object identification in room:   Simple yes/no questions:   Complex yes/no questions:   Conversational comprehension:     Expressive language: WFL all subsets  Seriated automatics:   Confrontation naming:   Phrase completion:   Responsive naming:   Generative naming:   Sentence generation:   Expressive language characteristics: On-topic, Appropriate syntax, Appropriate vocabulary    Cognition: [facility] for orientation, personal information, memory. Did not test formally    Home Exercise Program:  Lingual and labial strengthening exercises and target sounds in initial and final  position    Treatment/Education:  Education provided: Yes   Learner: Patient   Barriers to learning: None   Method of teaching: Verbal, Written, Demonstration, and Visual   Topic: role of ST, results of assessment, and recommended communication strategies   Outcome of teaching: Pt/family demonstrated good understanding, Pt verbalized understanding, Education will be reinforced during follow-up visits, as appropriate, and teachback/return demonstration  Treatment provided: No  Next Treatment Priority: resistance exercises

## 2025-06-18 NOTE — PROGRESS NOTES
Occupational Therapy    Discharge Summary    Name: Isaac Condon  MRN: [id number]  : 1955  Date: 25    Discharge Summary: OT    Discharge Information: Date of discharge 25, Date of last visit 25, Date of evaluation 25, Number of attended visits 5, Referred by Eneida Tenorio MD, and Referred for CVA    Therapy Summary: Skilled OT services addressed ROM, strength, and HEP to increase independence with ADL and IADL tasks.      Discharge Status:  Pt failed to return for further OT intervention.        Rehab Discharge Reason: Failed to schedule and/or keep follow-up appointment(s)

## 2025-06-18 NOTE — LETTER
June 18, 2025    Eneida Tenorio MD  [address] [facility] [address]    Patient: Isaac Condon   YOB: 1955   Date of Visit: 6/18/2025       Dear Eneida Tenorio MD  [address] [place] [address]    The attached plan of care is being sent to you because your patient’s medical reimbursement requires that you certify the plan of care. Your signature is required to allow uninterrupted insurance coverage.      You may indicate your approval by signing below and faxing this form back to us at Dept Fax: [phone].    Please call Dept: [phone] with any questions or concerns.    Thank you for this referral,        ANUEL Porter  [address] [id number] [phone]    Payer: Payor: MEDICARE / Plan: MEDICARE PART A AND B / Product Type: *No Product type* /                                                                         Date:     Dear ANUEL Porter,     Re: Mr. Isaac Condon, MRN:[id number]    I certify that I have reviewed the attached plan of care and it is medically necessary for Mr. Isaac Condon (1955) who is under my care.          ______________________________________                    _________________  Provider name and credentials                                           Date and time                                                                                           Plan of Care 3/27/25   Effective from: 3/27/2025  Effective to: 3/27/2026    Plan ID: [id number]            Participants as of Finalize on 6/18/2025    Name Type Comments Contact Info    Eneida Tenorio MD Referring Provider  [phone]    ANUEL Porter Speech Language Pathologist  [phone]       Last Plan Note     Author: ANUEL Porter Status: Sign when Signing Visit Last edited: 6/18/2025  9:00 AM          Speech-Language Pathology Speech/Language/Cognition Evaluation    Patient Name: Isaac HAMPTON  Taurus  MRN: [id number]  : 1955  Today's Date: 2025  Time Calculation  Start Time: 0900  Stop Time: 0950  Time Calculation (min): 50 min      ASSESSMENT  Impressions:  Pt presents with mild to moderate dysarthria, partially due to effects of CVA and partially due to being edentulous. The pt demonstrated reduced lingual and labial strength and coordination. The patient is stimulable for proper positioning of tongue and lips for production of speech sounds.    Skilled speech therapy is medically necessary and ordered by a physician/MIRELA in order to maximize speech intelligibility, reduce dependence on others, and improve quality of life. Without skilled speech therapy, pt is at risk for isolation and depression.    Prognosis: Fair      PLAN    Recommended frequency/duration:  Skilled SLP services recommended: Yes  Frequency: 1x/week  Duration: 6 weeks  Treatment/Interventions: Articulation, Oral motor exercises, Patient/family education  Strengths: Family/caregiver support and Low severity of deficits  Barriers to participation in tx: N/A    Payor authorization information:  Payor: MEDICARE / Plan: MEDICARE PART A AND B / Product Type: *No Product type* /   Certification Period Start Date: 25  Certification Period End Date: 26  Number of Authorized Treatments : medical necessity  Total Number of Visits : 1    Short Term Goals: -25  Pt will improve speech intelligibility to 90% given min cues for use of intelligibility strategies.  Pt will imitate consonants/vowels/single words with 95 acc given min cues.  Pt will complete lingual and labial strengthening exercises with accuracy on 4/5 repetitions and with good effort, given min cues.    Status: Goal initiated   Progress this date: N/A    Long-Term Goals:  Pt will independently use strategies for increasing speech intelligibility with 95% acc  Pt will independently demonstrate lingual and labial exercises with 100% acc  Pt will improve  speech intelligibility to 95% in short phrases.   Status: Goal initiated   Progress this date: N/A      SUBJECTIVE    PMHx relevant to rehab:   This patient experienced a CVA in 3/2025 with resultant L facial droop, labial and lingual impairment and dysarthric speech. The patient has received speech therapy while in acute care and at an Acute Rehab facility. The patient has been discharged home and is wanting to continue speech therapy to increase intelligibility of his speech.         Living Environment: Home with spouse  Date of onset: 3/16/25  Date of order: 3/27/25    Pain Assessment  Pain Assessment: 0-10  0-10 (Numeric) Pain Score: 0 - No pain      OBJECTIVE    Oral motor:  General appearance: Slight open mouth posture with reduced labial symmetry but no droop   Lingual: Cannot produce a tongue tip, tongue is flat and round, ROM is adequate.   Labial: Reduced ROM and strength  Palatal: DNT    Motor speech:  Intelligibility: 80% in conversation, increase in intelligibility with cues to overarticulate  Typical utterance length:   Motor speech characteristics: Reduced amplitude, Consonant distortions  Perceptual voice characteristics: Weak    Receptive language: WFL all subsets  Following 1-step commands:   Following 2-step commands:   Object identification in room:   Simple yes/no questions:   Complex yes/no questions:   Conversational comprehension:     Expressive language: WFL all subsets  Seriated automatics:   Confrontation naming:   Phrase completion:   Responsive naming:   Generative naming:   Sentence generation:   Expressive language characteristics: On-topic, Appropriate syntax, Appropriate vocabulary    Cognition: [facility] for orientation, personal information, memory. Did not test formally    Home Exercise Program:  Lingual and labial strengthening exercises and target sounds in initial and final position    Treatment/Education:  Education provided: Yes   Learner: Patient   Barriers to learning:  None   Method of teaching: Verbal, Written, Demonstration, and Visual   Topic: role of ST, results of assessment, and recommended communication strategies   Outcome of teaching: Pt/family demonstrated good understanding, Pt verbalized understanding, Education will be reinforced during follow-up visits, as appropriate, and teachback/return demonstration  Treatment provided: No  Next Treatment Priority: resistance exercises            Current Participants as of 6/18/2025    Name Type Comments Contact Info    Eneida Tenorio MD Referring Provider  [phone]    Signature pending    Janet Gross SLP Speech Language Pathologist  [phone]    Signature pending

## 2025-06-25 ENCOUNTER — APPOINTMENT (OUTPATIENT)
Dept: SPEECH THERAPY | Facility: HOSPITAL | Age: 70
End: 2025-06-25
Payer: MEDICARE

## 2025-06-26 ENCOUNTER — HOSPITAL ENCOUNTER (OUTPATIENT)
Dept: VASCULAR MEDICINE | Facility: HOSPITAL | Age: 70
Discharge: HOME | End: 2025-06-26
Payer: MEDICARE

## 2025-06-26 DIAGNOSIS — I65.23 OCCLUSION AND STENOSIS OF BILATERAL CAROTID ARTERIES: ICD-10-CM

## 2025-06-26 DIAGNOSIS — I65.21 CAROTID STENOSIS, RIGHT: ICD-10-CM

## 2025-06-26 PROCEDURE — 93880 EXTRACRANIAL BILAT STUDY: CPT | Performed by: INTERNAL MEDICINE

## 2025-06-26 PROCEDURE — 93880 EXTRACRANIAL BILAT STUDY: CPT

## 2025-07-01 ENCOUNTER — APPOINTMENT (OUTPATIENT)
Dept: VASCULAR SURGERY | Facility: HOSPITAL | Age: 70
End: 2025-07-01
Payer: MEDICARE

## 2025-07-02 ENCOUNTER — TREATMENT (OUTPATIENT)
Dept: SPEECH THERAPY | Facility: HOSPITAL | Age: 70
End: 2025-07-02
Payer: MEDICARE

## 2025-07-02 DIAGNOSIS — I69.322 DYSARTHRIA AS LATE EFFECT OF CEREBELLAR CEREBROVASCULAR ACCIDENT (CVA): ICD-10-CM

## 2025-07-02 PROCEDURE — 92507 TX SP LANG VOICE COMM INDIV: CPT | Mod: GN

## 2025-07-02 ASSESSMENT — PAIN SCALES - GENERAL: PAINLEVEL_OUTOF10: 0 - NO PAIN

## 2025-07-02 NOTE — PROGRESS NOTES
Speech-Language Pathology    SLP Adult Outpatient Speech-Language Pathology Treatment     Patient Name: Isaac Condon  MRN: [id number]  Today's Date: 7/2/2025     Time Calculation  Start Time: 0830  Stop Time: 0915  Time Calculation (min): 45 min      Current Problem:   1. Dysarthria as late effect of cerebellar cerebrovascular accident (CVA)  Follow Up In Speech Therapy        Pt presents with mild to moderate dysarthria, partially due to effects of CVA and partially due to being edentulous. The pt demonstrated reduced lingual and labial strength and coordination. The patient is stimulable for proper positioning of tongue and lips for production of speech sounds.     Skilled speech therapy is medically necessary and ordered by a physician/MIRELA in order to maximize speech intelligibility, reduce dependence on others, and improve quality of life. Without skilled speech therapy, pt is at risk for isolation and depression.    SLP Assessment:  SLP TX Intervention Outcome: Making Progress Towards Goals  SLP Assessment Results: Motor Speech Deficits  Education Provided: Yes       Plan:  Inpatient/Swing Bed or Outpatient: Outpatient  Treatment/Interventions: Articulation, Oral motor exercises, Patient/family education  SLP TX Plan: Continue Plan of Care  SLP Plan: Skilled SLP  SLP Frequency: 1x per week  Duration: 6 weeks  Discussed POC: Patient  Discussed Risks/Benefits: Yes  Patient/Caregiver Agreeable: Yes    General Visit Information:  Certification Period Start Date: 03/17/25  Certification Period End Date: 03/17/26  Number of Authorized Treatments : medical necessity  Total Number of Visits : 2            Pain Assessment:  Pain Assessment  0-10 (Numeric) Pain Score: 0 - No pain      Objective   Pt presented for speech therapy. He reported compliance with oral motor and speech production exercises at home.    Motor Speech:   Motor Speech Intervention : Compensatory Speech Strategies, Word Repetitions, Sentence  Repetitions, Oral Lingual Strengthening Techniques    Short Term Goals: 6/18-7/30/25  Pt will improve speech intelligibility to 90% given min cues for use of intelligibility strategies.  Pt will produced targeted consonants in single words and short phrases with 95% acc given min cues.  Pt will complete lingual and labial strengthening exercises with accuracy on 4/5 repetitions and with good effort, given min cues.               Status: Progressing               Progress this date: Pt completed labial and lingual exercises with good effort and with 80% accuracy  Pt completed speech production tasks for /b/, /p/, /sh/, /ch/ in first and last position of single words and short phrases with 70% acc independently and 80% with verbal and visual cues.        Long-Term Goals:6/18/-7/30/25  Pt will independently use strategies for increasing speech intelligibility with 95% acc  Pt will independently demonstrate lingual and labial exercises with 100% acc  Pt will improve speech intelligibility to 95% in short phrases.              Status: Progressing                    Outpatient Education:  Adult Outpatient Education  Individual(s) Educated: Patient  Written Education : HEP  Patient Response to Education: Patient/Caregiver Performed Return Demonstration of Exercises/Activities, Patient/Caregiver Verbalized Understanding of Information

## 2025-07-10 ENCOUNTER — TREATMENT (OUTPATIENT)
Dept: SPEECH THERAPY | Facility: HOSPITAL | Age: 70
End: 2025-07-10
Payer: MEDICARE

## 2025-07-10 DIAGNOSIS — I69.322 DYSARTHRIA AS LATE EFFECT OF CEREBELLAR CEREBROVASCULAR ACCIDENT (CVA): ICD-10-CM

## 2025-07-10 PROCEDURE — 92507 TX SP LANG VOICE COMM INDIV: CPT | Mod: GN

## 2025-07-10 ASSESSMENT — PAIN SCALES - GENERAL: PAINLEVEL_OUTOF10: 0 - NO PAIN

## 2025-07-10 NOTE — PROGRESS NOTES
Speech-Language Pathology    SLP Adult Outpatient Speech-Language Pathology Treatment     Patient Name: Isaac Condon  MRN: [id number]  Today's Date: 7/10/2025     Time Calculation  Start Time: 1030  Stop Time: 1115  Time Calculation (min): 45 min      Current Problem:   1. Dysarthria as late effect of cerebellar cerebrovascular accident (CVA)  Follow Up In Speech Therapy        Pt presents with mild to moderate dysarthria, partially due to effects of CVA and partially due to being edentulous. The pt demonstrated reduced lingual and labial strength and coordination. The patient is stimulable for proper positioning of tongue and lips for production of speech sounds.     Skilled speech therapy is medically necessary and ordered by a physician/MIRELA in order to maximize speech intelligibility, reduce dependence on others, and improve quality of life. Without skilled speech therapy, pt is at risk for isolation and depression.              Plan:  Inpatient/Swing Bed or Outpatient: Outpatient  SLP Frequency: 1x per week  Duration: 6 weeks  Next Treatment Priority: s/z in initial and final position single words    General Visit Information:  Certification Period Start Date: 03/17/25  Certification Period End Date: 03/17/26  Number of Authorized Treatments : medical necessity  Total Number of Visits : 3            Pain Assessment:  Pain Assessment  0-10 (Numeric) Pain Score: 0 - No pain      Objective   Pt presented for speech therapy. He reported compliance with oral motor and speech production exercises at home. Patient stated that he thinks his speech is sounding better.    Motor Speech:    Lingual and labial exercises with resistance    Short Term Goals: 6/18-7/30/25  Pt will improve speech intelligibility to 90% given min cues for use of intelligibility strategies.  Pt will produced targeted consonants in single words and short phrases with 95% acc given min cues.  Pt will complete lingual and labial strengthening  exercises with accuracy on 4/5 repetitions and with good effort, given min cues.               Status: Progressing               Progress this date: Pt completed labial and lingual exercises with good effort and with 90% accuracy with min visual cues.  Pt completed speech production tasks for /b/, /p/, /sh/, /ch/ in first and last position of single words and short phrases with 90% acc independently and 100% with verbal and visual cues.        Long-Term Goals:6/18/-7/30/25  Pt will independently use strategies for increasing speech intelligibility with 95% acc  Pt will independently demonstrate lingual and labial exercises with 100% acc  Pt will improve speech intelligibility to 95% in short phrases.              Status: Progressing                    Outpatient Education:  Adult Outpatient Education  Individual(s) Educated: Patient  Written Education : HEP  Patient Response to Education: Patient/Caregiver Performed Return Demonstration of Exercises/Activities, Patient/Caregiver Verbalized Understanding of Information

## 2025-07-17 ENCOUNTER — TREATMENT (OUTPATIENT)
Dept: SPEECH THERAPY | Facility: HOSPITAL | Age: 70
End: 2025-07-17
Payer: MEDICARE

## 2025-07-17 ENCOUNTER — DOCUMENTATION (OUTPATIENT)
Dept: SPEECH THERAPY | Facility: HOSPITAL | Age: 70
End: 2025-07-17

## 2025-07-17 DIAGNOSIS — I69.322 DYSARTHRIA AS LATE EFFECT OF CEREBELLAR CEREBROVASCULAR ACCIDENT (CVA): ICD-10-CM

## 2025-07-17 PROCEDURE — 92507 TX SP LANG VOICE COMM INDIV: CPT | Mod: GN

## 2025-07-17 ASSESSMENT — PAIN SCALES - GENERAL: PAINLEVEL_OUTOF10: 0 - NO PAIN

## 2025-07-17 NOTE — PROGRESS NOTES
Speech-Language Pathology    Discharge Summary    Name: Isaac Condon  MRN: [id number]  : 1955  Date: 25    Discharge Summary: SLP    Discharge Information:   Date of discharge 25   Date of last visit 25  Date of evaluation 25  Number of attended visits 4  Referred by Dr. Tenorio  Referred for Speech eval and treat due to dysarthria following CVA    Therapy Summary:   Short Term Goals: -25  Pt will improve speech intelligibility to 90% given min cues for use of intelligibility strategies.   Goal Met: Pt's speech intelligibility at 95% in single words and short phrases    Pt will imitate consonants/vowels/single words with 95 acc given min cues.   Goal Met: 98% intelligible  with target in all positions of words  Pt will complete lingual and labial strengthening exercises with accuracy on 4/5 repetitions and with good effort, given min cues.               Status: Goal Met Pt completed all lingual and labial strengthening exercises with 100% acc     Long-Term Goals:  Pt will independently use strategies for increasing speech intelligibility with 95% acc   Goal almost met; Pt at 90%; he is aware of need to slow his speech down and over articulate  Pt will independently demonstrate lingual and labial exercises with 100% acc   Goal Met; no cues needed for completion of strengthening exercises  Pt will improve speech intelligibility to 95% in short phrases.   Status: Goal Met; Pt increased intelligibility of bilabial and fricative sounds in all postions                Discharge Status: Pt has come to every session of speech therapy. He has consistently completed HEP independently. The patient has improved speech intelligibility to nearly 100%. The patient is requesting discharge with continuation of HEP (lingual and labial strengthening exercises and speech production tasks) independently at home.    Rehab Discharge Reason: Achieved all and/or the most significant goals(s) and Patient  requested due to ability to continue with HEP independently at home.

## 2025-07-31 ENCOUNTER — APPOINTMENT (OUTPATIENT)
Dept: NEUROLOGY | Facility: HOSPITAL | Age: 70
End: 2025-07-31
Payer: MEDICARE

## 2025-07-31 VITALS
BODY MASS INDEX: 19.88 KG/M2 | SYSTOLIC BLOOD PRESSURE: 126 MMHG | HEART RATE: 65 BPM | DIASTOLIC BLOOD PRESSURE: 76 MMHG | WEIGHT: 146.6 LBS

## 2025-07-31 DIAGNOSIS — D64.9 ANEMIA, UNSPECIFIED TYPE: ICD-10-CM

## 2025-07-31 DIAGNOSIS — I63.20: Primary | ICD-10-CM

## 2025-07-31 DIAGNOSIS — G25.81 RESTLESS LEGS SYNDROME: ICD-10-CM

## 2025-07-31 DIAGNOSIS — R06.83 SNORING: ICD-10-CM

## 2025-07-31 DIAGNOSIS — R29.898 LEFT ARM WEAKNESS: ICD-10-CM

## 2025-07-31 DIAGNOSIS — F10.11 ALCOHOL ABUSE, IN REMISSION: ICD-10-CM

## 2025-07-31 PROCEDURE — 99214 OFFICE O/P EST MOD 30 MIN: CPT | Performed by: NURSE PRACTITIONER

## 2025-07-31 PROCEDURE — 1159F MED LIST DOCD IN RCRD: CPT | Performed by: NURSE PRACTITIONER

## 2025-07-31 PROCEDURE — 1160F RVW MEDS BY RX/DR IN RCRD: CPT | Performed by: NURSE PRACTITIONER

## 2025-07-31 PROCEDURE — 1126F AMNT PAIN NOTED NONE PRSNT: CPT | Performed by: NURSE PRACTITIONER

## 2025-07-31 ASSESSMENT — PAIN SCALES - GENERAL: PAINLEVEL_OUTOF10: 0-NO PAIN

## 2025-07-31 NOTE — PROGRESS NOTES
[facility]    SUBJECTIVE    Isaac Condon is a 70 y.o. ambidextrous male who presents with   Chief Complaint   Patient presents with    Stroke    Hospital Follow-up        Presents for follow up visit  Visit type: in-clinic visit    HISTORY OF PRESENT ILLNESS    RECAP:  Pt was admitted to [facility] from 3/16/25 - 3/19/25 for stroke like symptoms. Seen inpatient by Dr Kohler and myself.    PMH: bronchitis, HTN and hearing loss s/p cochlear implant     Presented for dysarthria x 2 days PTA. Initial BP was 179/109. Not on antiplatelet or statin PTA.     Testing showed:  HCT - no acute findings but some streak artifact on R side and ? Interval development of hypodensity to L corona radiata (not on HCT in December 2024) (not reported by radiologist)  CTA head/neck - heavy atherosclerosis through carotid siphons, severe stenosis R ICA cavernous segment, ~70% stenosis R carotid bifurcation and severe short segment stenosis distal R subclavian  Carotid US <50% stenosis bilaterally  Lipid panel with LDL 88, CHOL 213, TG 71  A1C 4.4%  TTE with normal EF and LA size, + bubble study  Unable to get MRI due to cochlear implants    Plan established including:  Suspect acute ischemic stroke, clinically, 3/14/25   DAPT x 90 days then 81 mg aspirin monotherapy, HI statin, smoking cessation, defer PFO to cardiology (less likely related to this stroke), carotid surgery in at least 2 weeks.     7/31/25 - presents with wife for today's visit.     Had R TCAR on 5/5/25 by Dr. Crooks, continued on DAPT per vascular team.     LDL 72 on recheck.     Tolerating DAPT well, some bruising, denies any bleeding issues.     Graduated from speech therapy. Still having some dysarthria.     Did not finish OT, interrupted by his carotid procedure. Having some slight LUE weakness compared to R.     Does snore, was never previously tested fro sleep apnea. Pt denies waking up frequently during night.     Holter done by cardiology, no  afib, nonsustained SVT    Has cut back to 1-2 cigarettes per day currently.     C/o restless legs symptoms. Wife notices. Patient not very distressed by it. Pre-existed the stroke but worse since stroke.       REVIEW OF SYSTEMS:  10 point review of systems performed and is negative except as noted in the HPI.     Medical History[1]    No relevant family history has been documented for this patient.    Surgical History[2]    Social History     Substance and Sexual Activity   Drug Use Not Currently    Types: Marijuana    Comment: pt states he stopped smoking marijuana     Tobacco Use: Medium Risk (7/31/2025)    Patient History     Smoking Tobacco Use: Former     Smokeless Tobacco Use: Former     Passive Exposure: Current     Alcohol Use: Not At Risk (5/5/2025)    AUDIT-C     Frequency of Alcohol Consumption: Never     Average Number of Drinks: Patient does not drink     Frequency of Binge Drinking: Never   Recent Concern: Alcohol Use - Alcohol Misuse (3/19/2025)    Received from [facility]    AUDIT-C     Q1: How often do you have a drink containing alcohol?: 2-3 times a week     Q2: How many drinks containing alcohol do you have on a typical day when you are drinking?: 1 or 2     Q3: How often do you have six or more drinks on one occasion?: Less than monthly       Current Outpatient Medications   Medication Instructions    acetaminophen (TYLENOL) 650 mg, oral, Every 6 hours PRN    albuterol 90 mcg/actuation inhaler 2 puffs, inhalation, Every 6 hours PRN    aspirin 81 mg, oral, Daily    atorvastatin (LIPITOR) 40 mg, oral, Nightly    cholecalciferol (VITAMIN D-3) 2,000 Units, Daily    clopidogrel (PLAVIX) 75 mg, oral, Daily    melatonin 5 mg, Nightly    multivitamin with minerals iron-free (Centrum Silver) 1 tablet, Daily    sennosides-docusate sodium (Soledad-Colace) 8.6-50 mg tablet 1 tablet, oral, Nightly    thiamine (VITAMIN B-1) 100 mg, Daily RT          OBJECTIVE    /76   Pulse 65   Wt 66.5 kg (146 lb  9.6 oz)   BMI 19.88 kg/m²       Physical Exam    Neurological:      Cranial Nerves: Dysarthria present.       Neurological Exam  Mental Status  Awake, alert and oriented to person, place and time. Mild dysarthria present. Language is fluent with no aphasia. Attention and concentration are normal. Fund of knowledge is appropriate for level of education.    Cranial Nerves  CN II-XII grossly intact.    Motor  Normal muscle bulk throughout. No fasciculations present. Normal muscle tone. No abnormal involuntary movements.  Strength 5/5 throughout except 4+/5 to LUE.    Sensory  Light touch is normal in upper and lower extremities.     Coordination  Right: Finger-to-nose normal.Left: Finger-to-nose normal.    Gait  Casual gait is normal including stance, stride, and arm swing.    CT head wo IV contrast 03/16/2025    Narrative  Interpreted By:  Cresencio Wilder,  STUDY:  CT HEAD WO IV CONTRAST;  3/16/2025 2:23 pm    INDICATION:  Signs/Symptoms:cva.      COMPARISON:  CT Brain, 1 December 2024 and 4 November 2013    ACCESSION NUMBER(S):  [id number]    ORDERING CLINICIAN:  CONNOR PÉREZ    TECHNIQUE:  CT of the brain from the skull vertex to the skull base, without  intravenous contrast    FINDINGS:  ACUTE INTRA-AXIAL HEMORRHAGE:  Negative    ACUTE EXTRA-AXIAL/SUBDURAL HEMORRHAGE:  Negative    ACUTE INTRACRANIAL MASS EFFECT:  Negative    CT EVIDENCE OF ACUTE / SUBACUTE TERRITORIAL ISCHEMIA:  Negative    VENTRICLES:  New    OTHER BRAIN FINDINGS:  No interval change to the CT appearance of the  brain and note of streak artifact on the right side limiting fine  detail    INCLUDED PARANASAL SINUSES: All clear    INCLUDED MASTOID AIR CELLS: All clear    SKULL:  No lytic or blastic lesion    EXTRACRANIAL SOFT TISSUES:  Scalp and occular globes grossly normal  by CT    Impression  NO ACUTE INTRACRANIAL PROCESS    MACRO:  None    Signed by: Cresencio Wilder 3/16/2025 2:30 PM  Dictation workstation:   [id number]     CT angio head and  neck w and wo IV contrast 03/17/2025    Narrative  Interpreted By:  Evans Motley,  STUDY:  CT ANGIO HEAD AND NECK W AND WO IV CONTRAST;  3/17/2025 3:43 pm    INDICATION:  Signs/Symptoms:stroke, unable to do MRI.      COMPARISON:  CT head yesterday.    ACCESSION NUMBER(S):  [id number]    ORDERING CLINICIAN:  OH RUSSELL    TECHNIQUE:  Unenhanced CT images of the head were obtained.  Subsequently,  50 ML  of Omnipaque 350 was administered intravenously and axial images of  the head and neck were acquired.  Coronal, sagittal, and 3-D  reconstructions were provided for review.    FINDINGS:    CTA COW: No major vascular occlusion. Heavy atherosclerotic  calcifications through carotid siphons. Suspected severe segmental  stenosis cavernous segment right internal carotid artery. No  aneurysms.  No vascular malformations. Patent dural venous sinuses  and intracranial deep venous structures.    CTA CAROTID: Origins of the great vessels from the aortic arch are  incompletely imaged on this study. There appears to be at least  moderate short-segment atherosclerotic stenosis proximal right  internal carotid artery. There is also moderate short-segment  atherosclerotic narrowing of the proximal left subclavian artery..  Severe short-segment stenosis distal right subclavian artery.    Bulky mixed calcific and fibrofatty atherosclerotic plaque across the  right carotid bifurcation. Narrowest luminal diameter is 1.4 mm which  is right at the bifurcation. This corresponds to approximately 70%  stenosis. On the left there is slightly lesser atherosclerotic  involvement without significant luminal narrowing.    Moderate short-segment atherosclerotic stenosis origin of the right  vertebral artery.    NONVASCULAR HEAD: No acute intracranial hemorrhage. No intracranial  mass or significant mass effect. Global brain parenchymal volume loss  and ventriculomegaly similar to prior. No evidence of large evolving  cortical  infarction.    NONVASCULAR NECK: No soft tissue mass. No adenopathy. Salivary glands  are unremarkable. Thyroid gland unremarkable. Bones intact. Moderate  to advanced multilevel degenerative cervical spondylosis.    Impression  1. No intracranial major vascular occlusion.  2. Heavy atherosclerotic calcifications through carotid siphons.  Suspected severe segmental stenosis cavernous segment right internal  carotid artery.  3. Approximately 70% short-segment atherosclerotic stenosis of the  right carotid bifurcation.  4. Severe short-segment stenosis distal right subclavian artery.  5. Otherwise foci of stenosis as described above.  6. Global brain parenchymal volume loss and ventriculomegaly similar  to prior.    MACRO:  None    Signed by: Evans Motley 3/17/2025 7:41 PM  Dictation workstation:   [id number]      Lab Results   Component Value Date    WBC 6.9 05/23/2025    RBC 4.41 05/23/2025    HGB 14.4 05/23/2025    HCT 43.7 05/23/2025     05/23/2025     05/23/2025    K 4.5 05/23/2025     05/23/2025    BUN 10 05/23/2025    CREATININE 0.97 05/23/2025    EGFR 85 05/23/2025    CALCIUM 9.9 05/23/2025    ALKPHOS 116 05/23/2025    AST 19 05/23/2025    ALT 15 05/23/2025    MG 1.81 05/07/2025    VITD25 52 05/23/2025    HGBA1C 4.4 03/16/2025    LDLDIRECT 121 05/28/2020    LDLCALC 72 05/23/2025    CHOL 153 05/23/2025    HDL 64 05/23/2025    TRIG 83 05/23/2025    TSH 3.62 08/29/2024          ASSESSMENT & PLAN  Problem List Items Addressed This Visit       Alcohol abuse, in remission    Relevant Orders    Ferritin    Left arm weakness    Relevant Orders    Referral to Occupational Therapy     Other Visit Diagnoses         Ischemic cerebral vascular accident (CVA) due to stenosis of large extracranial artery (Multi)    -  Primary    Relevant Orders    In-Center Sleep Study    Referral to Occupational Therapy    Follow Up In Neurology      Restless legs syndrome        Relevant Orders    Ferritin     In-Center Sleep Study    Follow Up In Neurology      Snoring        Relevant Orders    In-Center Sleep Study      Anemia, unspecified type        Relevant Orders    Ferritin          Continue DAPT of aspirin and plavix for stroke prevention  Continue HI statin daily  Continue to monitor and manage vascular risk factors.   Smoking cessation.  In lab split night PSG ordered to evaluate for AHSAN  Will check ferritin level, if <75 can contribute to RLS symptoms.   Referral placed for OT for LUE weakness.     Vascular Risk Factor modification goals:  Blood pressure goals: goals 130-140/70-80  Lipid Goals: education on healthy diet and statin therapy to maintain or achieve goal LDL-cholesterol < 70mg  Glucose Goals: early treatment of hyperglycemia to goal glucose 140-180 mg/dl with long-term goal A1c < 7%   Smoking Cessation and Education  Assessment for Rehabilitation needs   Patient and family education on signs and symptoms of stroke, calling 911, healthy strategies for stroke prevention.        FU in 3 months       Johanna Keen, APRN-CNP         [1]   Past Medical History:  Diagnosis Date    Bronchitis     COPD (chronic obstructive pulmonary disease) (Multi)     Current smoker 05/14/2024    HTN (hypertension)     Hyperlipidemia     Vasovagal syncope 01/28/2025   [2]   Past Surgical History:  Procedure Laterality Date    COCHLEAR IMPLANT      FOOT SURGERY      toe fracture repair

## 2025-08-01 ENCOUNTER — PATIENT OUTREACH (OUTPATIENT)
Dept: PRIMARY CARE | Facility: CLINIC | Age: 70
End: 2025-08-01
Payer: MEDICARE

## 2025-08-01 PROBLEM — G25.81 RESTLESS LEGS SYNDROME: Status: ACTIVE | Noted: 2025-08-01

## 2025-08-01 PROBLEM — I63.20: Status: ACTIVE | Noted: 2025-08-01

## 2025-08-02 DIAGNOSIS — Z12.5 SCREENING FOR PROSTATE CANCER: ICD-10-CM

## 2025-08-02 DIAGNOSIS — I63.9 CEREBROVASCULAR ACCIDENT (CVA), UNSPECIFIED MECHANISM (MULTI): ICD-10-CM

## 2025-08-02 DIAGNOSIS — E87.1 HYPONATREMIA: ICD-10-CM

## 2025-08-02 DIAGNOSIS — E78.00 PURE HYPERCHOLESTEROLEMIA: ICD-10-CM

## 2025-08-02 DIAGNOSIS — E55.9 VITAMIN D DEFICIENCY: ICD-10-CM

## 2025-08-02 DIAGNOSIS — J42 CHRONIC BRONCHITIS, UNSPECIFIED CHRONIC BRONCHITIS TYPE (MULTI): ICD-10-CM

## 2025-08-05 ENCOUNTER — APPOINTMENT (OUTPATIENT)
Dept: VASCULAR SURGERY | Facility: HOSPITAL | Age: 70
End: 2025-08-05
Payer: MEDICARE

## 2025-08-25 ENCOUNTER — CLINICAL SUPPORT (OUTPATIENT)
Dept: SLEEP MEDICINE | Facility: CLINIC | Age: 70
End: 2025-08-25
Payer: MEDICARE

## 2025-08-25 VITALS
WEIGHT: 146.61 LBS | HEIGHT: 72 IN | DIASTOLIC BLOOD PRESSURE: 76 MMHG | SYSTOLIC BLOOD PRESSURE: 126 MMHG | BODY MASS INDEX: 19.86 KG/M2

## 2025-08-25 DIAGNOSIS — G25.81 RESTLESS LEGS SYNDROME: ICD-10-CM

## 2025-08-25 DIAGNOSIS — R06.83 SNORING: ICD-10-CM

## 2025-08-25 DIAGNOSIS — G47.9 SLEEP DISORDER, UNSPECIFIED: ICD-10-CM

## 2025-08-25 DIAGNOSIS — I63.20: ICD-10-CM

## 2025-08-25 PROCEDURE — 95810 POLYSOM 6/> YRS 4/> PARAM: CPT | Performed by: STUDENT IN AN ORGANIZED HEALTH CARE EDUCATION/TRAINING PROGRAM

## 2025-08-25 ASSESSMENT — SLEEP AND FATIGUE QUESTIONNAIRES
HOW LIKELY ARE YOU TO NOD OFF OR FALL ASLEEP WHILE SITTING AND READING: HIGH CHANCE OF DOZING
ESS-CHAD TOTAL SCORE: 10
HOW LIKELY ARE YOU TO NOD OFF OR FALL ASLEEP IN A CAR, WHILE STOPPED FOR A FEW MINUTES IN TRAFFIC: WOULD NEVER DOZE
HOW LIKELY ARE YOU TO NOD OFF OR FALL ASLEEP WHEN YOU ARE A PASSENGER IN A CAR FOR AN HOUR WITHOUT A BREAK: WOULD NEVER DOZE
HOW LIKELY ARE YOU TO NOD OFF OR FALL ASLEEP WHILE WATCHING TV: HIGH CHANCE OF DOZING
HOW LIKELY ARE YOU TO NOD OFF OR FALL ASLEEP WHILE LYING DOWN TO REST IN THE AFTERNOON WHEN CIRCUMSTANCES PERMIT: WOULD NEVER DOZE
HOW LIKELY ARE YOU TO NOD OFF OR FALL ASLEEP WHILE SITTING QUIETLY AFTER LUNCH WITHOUT ALCOHOL: HIGH CHANCE OF DOZING
HOW LIKELY ARE YOU TO NOD OFF OR FALL ASLEEP WHILE SITTING AND TALKING TO SOMEONE: SLIGHT CHANCE OF DOZING
SITING INACTIVE IN A PUBLIC PLACE LIKE A CLASS ROOM OR A MOVIE THEATER: WOULD NEVER DOZE

## 2025-09-02 ENCOUNTER — TELEPHONE (OUTPATIENT)
Dept: NEUROLOGY | Facility: HOSPITAL | Age: 70
End: 2025-09-02
Payer: MEDICARE

## 2025-09-02 ENCOUNTER — RESULTS FOLLOW-UP (OUTPATIENT)
Dept: NEUROLOGY | Facility: HOSPITAL | Age: 70
End: 2025-09-02
Payer: MEDICARE

## 2025-09-06 LAB
25(OH)D3+25(OH)D2 SERPL-MCNC: 59 NG/ML (ref 30–100)
ALBUMIN SERPL-MCNC: 4.5 G/DL (ref 3.6–5.1)
ALP SERPL-CCNC: 112 U/L (ref 35–144)
ALT SERPL-CCNC: 13 U/L (ref 9–46)
ANION GAP SERPL CALCULATED.4IONS-SCNC: 9 MMOL/L (CALC) (ref 7–17)
AST SERPL-CCNC: 15 U/L (ref 10–35)
BILIRUB SERPL-MCNC: 0.5 MG/DL (ref 0.2–1.2)
BUN SERPL-MCNC: 12 MG/DL (ref 7–25)
CALCIUM SERPL-MCNC: 9.8 MG/DL (ref 8.6–10.3)
CHLORIDE SERPL-SCNC: 102 MMOL/L (ref 98–110)
CHOLEST SERPL-MCNC: 141 MG/DL
CHOLEST/HDLC SERPL: 2.4 (CALC)
CO2 SERPL-SCNC: 28 MMOL/L (ref 20–32)
CREAT SERPL-MCNC: 0.92 MG/DL (ref 0.7–1.35)
EGFRCR SERPLBLD CKD-EPI 2021: 91 ML/MIN/1.73M2
ERYTHROCYTE [DISTWIDTH] IN BLOOD BY AUTOMATED COUNT: 11.9 % (ref 11–15)
FERRITIN SERPL-MCNC: 611 NG/ML (ref 24–380)
GLUCOSE SERPL-MCNC: 92 MG/DL (ref 65–99)
HCT VFR BLD AUTO: 45.4 % (ref 38.5–50)
HDLC SERPL-MCNC: 60 MG/DL
HGB BLD-MCNC: 15.5 G/DL (ref 13.2–17.1)
LDLC SERPL CALC-MCNC: 60 MG/DL (CALC)
MCH RBC QN AUTO: 32.3 PG (ref 27–33)
MCHC RBC AUTO-ENTMCNC: 34.1 G/DL (ref 32–36)
MCV RBC AUTO: 94.6 FL (ref 80–100)
NONHDLC SERPL-MCNC: 81 MG/DL (CALC)
PLATELET # BLD AUTO: 260 THOUSAND/UL (ref 140–400)
PMV BLD REES-ECKER: 8.3 FL (ref 7.5–12.5)
POTASSIUM SERPL-SCNC: 4.4 MMOL/L (ref 3.5–5.3)
PROT SERPL-MCNC: 6.5 G/DL (ref 6.1–8.1)
PSA SERPL-MCNC: 0.27 NG/ML
RBC # BLD AUTO: 4.8 MILLION/UL (ref 4.2–5.8)
SODIUM SERPL-SCNC: 139 MMOL/L (ref 135–146)
TRIGL SERPL-MCNC: 119 MG/DL
TSH SERPL-ACNC: 2.54 MIU/L (ref 0.4–4.5)
WBC # BLD AUTO: 7.7 THOUSAND/UL (ref 3.8–10.8)

## 2025-09-10 ENCOUNTER — APPOINTMENT (OUTPATIENT)
Dept: PRIMARY CARE | Facility: CLINIC | Age: 70
End: 2025-09-10
Payer: MEDICARE

## (undated) DEVICE — SUTURE, MONOCRYL, 3-0, PS-1 27IN, UNDYED

## (undated) DEVICE — KIT, PERIPHERAL VASCULAR, CUSTOM, PORTAGE

## (undated) DEVICE — SUTURE, MONOCRYL, 4-0, 18 IN, PS2, UNDYED

## (undated) DEVICE — NEEDLE, PERCUTANEOUS ENTRY, THINWALL, W/O BASEPLATE, 18 G X 7 CM

## (undated) DEVICE — STRIP, SKIN CLOSURE, STERI STRIP, REINFORCED, 0.5 X 4 IN

## (undated) DEVICE — PAD, GROUNDING, ELECTROSURGICAL, W/9 FT CABLE, POLYHESIVE II, ADULT, LF

## (undated) DEVICE — DRESSING, TRANSPARENT, TEGADERM, 4 X 4-3/4 IN

## (undated) DEVICE — PROBE COVER, ULTRASOUND, 5 X 48

## (undated) DEVICE — SUTURE, PROLENE, 6-0, 30 IN, BV-1 BV-1

## (undated) DEVICE — GUIDEWIRE, 0.014 X 95CM

## (undated) DEVICE — GUIDEWIRE, ANGLE TIP,  .035 DIA, 180 CM, 3 CM TIP"

## (undated) DEVICE — DRESSING, GAUZE, SPONGE, VERSALON, ALL PURPOSE, 4 X 4 IN, SOFT

## (undated) DEVICE — SUTURE, PROLENE, 5-0, 36 IN, C-1, CV-11, BLUE

## (undated) DEVICE — SUTURE, SILK, 2-0, 30 IN, SH, BLACK

## (undated) DEVICE — COVER HANDLE LIGHT, STERIS, BLUE, STERILE

## (undated) DEVICE — FLOSEAL, MATRIX, HEMOSTATIC, FULL STERILE PREP, 5ML

## (undated) DEVICE — DRAPE, SHEET, MINOR PROCEDURE, T, PEDIATRIC, 100X122X77

## (undated) DEVICE — APPLICATOR, CHLORAPREP, W/ORANGE TINT, 26ML

## (undated) DEVICE — SUTURE, SILK, 2-0, 18 IN, BLACK

## (undated) DEVICE — DRAPE, INCISE, ANTIMICROBIAL, IOBAN 2, LARGE, 17 X 23 IN, DISPOSABLE, STERILE

## (undated) DEVICE — Device

## (undated) DEVICE — DEVICE, INFLATION, PRESTO ID40ATM 30CC

## (undated) DEVICE — SYRINGE, 30 CC, LUER LOCK

## (undated) DEVICE — GLOVE, PROTEXIS PI CLASSIC, SZ-7.0, PF, LF

## (undated) DEVICE — INTRODUCER KIT, MICROPUNCTURE, 7CM 21GA, SILKROAD

## (undated) DEVICE — SYRINGE, 10 CC, LUER LOCK

## (undated) DEVICE — SYRINGE, 5 CC, LUER LOCK

## (undated) DEVICE — DRAPE, SHEET, LAPAROTOMY, TRANSVERSE, W/FENESTRATION, 77 X 122 IN, DISPOSABLE, LF, STERILE

## (undated) DEVICE — TAPE, UMBILICAL, 1/8 X 30 IN, COTTON

## (undated) DEVICE — DEVICE, ENROUTE, TRANSCAROTID FLOW REVERSAL

## (undated) DEVICE — DRAPE, C-ARM IMAGE

## (undated) DEVICE — SUTURE, SILK, 4-0, 18 IN, LABYRINTH, BLACK

## (undated) DEVICE — SOLUTION, IRRIGATION, STERILE WATER, 1000 ML, POUR BOTTLE

## (undated) DEVICE — TOWEL PACK, STERILE, 4/PACK, BLUE